# Patient Record
Sex: FEMALE | Race: WHITE | Employment: OTHER | URBAN - METROPOLITAN AREA
[De-identification: names, ages, dates, MRNs, and addresses within clinical notes are randomized per-mention and may not be internally consistent; named-entity substitution may affect disease eponyms.]

---

## 2017-12-12 ENCOUNTER — APPOINTMENT (OUTPATIENT)
Dept: GENERAL RADIOLOGY | Age: 82
DRG: 244 | End: 2017-12-12
Attending: EMERGENCY MEDICINE
Payer: MEDICARE

## 2017-12-12 ENCOUNTER — HOSPITAL ENCOUNTER (INPATIENT)
Age: 82
LOS: 2 days | Discharge: HOME | DRG: 244 | End: 2017-12-14
Attending: EMERGENCY MEDICINE | Admitting: SPECIALIST
Payer: MEDICARE

## 2017-12-12 DIAGNOSIS — I44.2 COMPLETE HEART BLOCK (HCC): Primary | ICD-10-CM

## 2017-12-12 LAB
ALBUMIN SERPL-MCNC: 3.6 G/DL (ref 3.5–5)
ALBUMIN/GLOB SERPL: 1.1 {RATIO} (ref 1.1–2.2)
ALP SERPL-CCNC: 86 U/L (ref 45–117)
ALT SERPL-CCNC: 24 U/L (ref 12–78)
ANION GAP SERPL CALC-SCNC: 8 MMOL/L (ref 5–15)
APPEARANCE UR: ABNORMAL
AST SERPL-CCNC: 16 U/L (ref 15–37)
BACTERIA URNS QL MICRO: NEGATIVE /HPF
BASOPHILS # BLD: 0 K/UL (ref 0–0.1)
BASOPHILS NFR BLD: 0 % (ref 0–1)
BILIRUB SERPL-MCNC: 0.5 MG/DL (ref 0.2–1)
BILIRUB UR QL: NEGATIVE
BUN SERPL-MCNC: 39 MG/DL (ref 6–20)
BUN/CREAT SERPL: 31 (ref 12–20)
CALCIUM SERPL-MCNC: 9.2 MG/DL (ref 8.5–10.1)
CHLORIDE SERPL-SCNC: 105 MMOL/L (ref 97–108)
CK MB CFR SERPL CALC: 3.2 % (ref 0–2.5)
CK MB SERPL-MCNC: 4.4 NG/ML (ref 5–25)
CK SERPL-CCNC: 137 U/L (ref 26–192)
CO2 SERPL-SCNC: 25 MMOL/L (ref 21–32)
COLOR UR: ABNORMAL
CREAT SERPL-MCNC: 1.27 MG/DL (ref 0.55–1.02)
DIFFERENTIAL METHOD BLD: ABNORMAL
EOSINOPHIL # BLD: 0.5 K/UL (ref 0–0.4)
EOSINOPHIL NFR BLD: 4 % (ref 0–7)
EPITH CASTS URNS QL MICRO: ABNORMAL /LPF
ERYTHROCYTE [DISTWIDTH] IN BLOOD BY AUTOMATED COUNT: 13.6 % (ref 11.5–14.5)
GLOBULIN SER CALC-MCNC: 3.3 G/DL (ref 2–4)
GLUCOSE SERPL-MCNC: 114 MG/DL (ref 65–100)
GLUCOSE UR STRIP.AUTO-MCNC: NEGATIVE MG/DL
HCT VFR BLD AUTO: 38.8 % (ref 35–47)
HGB BLD-MCNC: 12.9 G/DL (ref 11.5–16)
HGB UR QL STRIP: NEGATIVE
HYALINE CASTS URNS QL MICRO: ABNORMAL /LPF (ref 0–5)
KETONES UR QL STRIP.AUTO: NEGATIVE MG/DL
LEUKOCYTE ESTERASE UR QL STRIP.AUTO: ABNORMAL
LYMPHOCYTES # BLD: 0.5 K/UL (ref 0.8–3.5)
LYMPHOCYTES NFR BLD: 4 % (ref 12–49)
MAGNESIUM SERPL-MCNC: 2.4 MG/DL (ref 1.6–2.4)
MCH RBC QN AUTO: 30.5 PG (ref 26–34)
MCHC RBC AUTO-ENTMCNC: 33.2 G/DL (ref 30–36.5)
MCV RBC AUTO: 91.7 FL (ref 80–99)
MONOCYTES # BLD: 0.5 K/UL (ref 0–1)
MONOCYTES NFR BLD: 4 % (ref 5–13)
NEUTS SEG # BLD: 11.6 K/UL (ref 1.8–8)
NEUTS SEG NFR BLD: 88 % (ref 32–75)
NITRITE UR QL STRIP.AUTO: NEGATIVE
PH UR STRIP: 5.5 [PH] (ref 5–8)
PLATELET # BLD AUTO: 300 K/UL (ref 150–400)
POTASSIUM SERPL-SCNC: 4.4 MMOL/L (ref 3.5–5.1)
PROT SERPL-MCNC: 6.9 G/DL (ref 6.4–8.2)
PROT UR STRIP-MCNC: NEGATIVE MG/DL
RBC # BLD AUTO: 4.23 M/UL (ref 3.8–5.2)
RBC #/AREA URNS HPF: ABNORMAL /HPF (ref 0–5)
RBC MORPH BLD: ABNORMAL
SODIUM SERPL-SCNC: 138 MMOL/L (ref 136–145)
SP GR UR REFRACTOMETRY: 1.02 (ref 1–1.03)
TROPONIN I SERPL-MCNC: <0.04 NG/ML
TSH SERPL DL<=0.05 MIU/L-ACNC: 2.48 UIU/ML (ref 0.36–3.74)
UA: UC IF INDICATED,UAUC: ABNORMAL
UROBILINOGEN UR QL STRIP.AUTO: 0.2 EU/DL (ref 0.2–1)
WBC # BLD AUTO: 13.1 K/UL (ref 3.6–11)
WBC URNS QL MICRO: ABNORMAL /HPF (ref 0–4)

## 2017-12-12 PROCEDURE — 65660000000 HC RM CCU STEPDOWN

## 2017-12-12 PROCEDURE — 87086 URINE CULTURE/COLONY COUNT: CPT | Performed by: SPECIALIST

## 2017-12-12 PROCEDURE — 74011250636 HC RX REV CODE- 250/636: Performed by: NURSE PRACTITIONER

## 2017-12-12 PROCEDURE — 82550 ASSAY OF CK (CPK): CPT | Performed by: EMERGENCY MEDICINE

## 2017-12-12 PROCEDURE — 84443 ASSAY THYROID STIM HORMONE: CPT | Performed by: NURSE PRACTITIONER

## 2017-12-12 PROCEDURE — 99285 EMERGENCY DEPT VISIT HI MDM: CPT

## 2017-12-12 PROCEDURE — 80053 COMPREHEN METABOLIC PANEL: CPT | Performed by: EMERGENCY MEDICINE

## 2017-12-12 PROCEDURE — 84484 ASSAY OF TROPONIN QUANT: CPT | Performed by: EMERGENCY MEDICINE

## 2017-12-12 PROCEDURE — 81001 URINALYSIS AUTO W/SCOPE: CPT | Performed by: NURSE PRACTITIONER

## 2017-12-12 PROCEDURE — 83735 ASSAY OF MAGNESIUM: CPT | Performed by: NURSE PRACTITIONER

## 2017-12-12 PROCEDURE — 74011250637 HC RX REV CODE- 250/637: Performed by: NURSE PRACTITIONER

## 2017-12-12 PROCEDURE — 71010 XR CHEST PORT: CPT

## 2017-12-12 PROCEDURE — 74011000250 HC RX REV CODE- 250: Performed by: NURSE PRACTITIONER

## 2017-12-12 PROCEDURE — 85025 COMPLETE CBC W/AUTO DIFF WBC: CPT | Performed by: EMERGENCY MEDICINE

## 2017-12-12 PROCEDURE — 36415 COLL VENOUS BLD VENIPUNCTURE: CPT | Performed by: EMERGENCY MEDICINE

## 2017-12-12 PROCEDURE — 93005 ELECTROCARDIOGRAM TRACING: CPT

## 2017-12-12 RX ORDER — IPRATROPIUM BROMIDE AND ALBUTEROL SULFATE 2.5; .5 MG/3ML; MG/3ML
3 SOLUTION RESPIRATORY (INHALATION)
Status: DISCONTINUED | OUTPATIENT
Start: 2017-12-13 | End: 2017-12-14 | Stop reason: HOSPADM

## 2017-12-12 RX ORDER — METOPROLOL SUCCINATE 25 MG/1
25 TABLET, EXTENDED RELEASE ORAL DAILY
COMMUNITY
End: 2017-12-14

## 2017-12-12 RX ORDER — PANTOPRAZOLE SODIUM 40 MG/1
40 TABLET, DELAYED RELEASE ORAL
COMMUNITY
End: 2018-04-19

## 2017-12-12 RX ORDER — IPRATROPIUM BROMIDE AND ALBUTEROL SULFATE 2.5; .5 MG/3ML; MG/3ML
3 SOLUTION RESPIRATORY (INHALATION) 2 TIMES DAILY
Status: DISCONTINUED | OUTPATIENT
Start: 2017-12-12 | End: 2017-12-12

## 2017-12-12 RX ORDER — GUAIFENESIN 100 MG/5ML
200 SOLUTION ORAL
COMMUNITY

## 2017-12-12 RX ORDER — MECLIZINE HCL 12.5 MG 12.5 MG/1
12.5 TABLET ORAL
COMMUNITY

## 2017-12-12 RX ORDER — ACETAMINOPHEN 325 MG/1
650 TABLET ORAL
Status: DISCONTINUED | OUTPATIENT
Start: 2017-12-12 | End: 2017-12-14 | Stop reason: HOSPADM

## 2017-12-12 RX ORDER — GUAIFENESIN 100 MG/5ML
200 SOLUTION ORAL
Status: DISCONTINUED | OUTPATIENT
Start: 2017-12-12 | End: 2017-12-14 | Stop reason: HOSPADM

## 2017-12-12 RX ORDER — LOPERAMIDE HYDROCHLORIDE 2 MG/1
2 CAPSULE ORAL AS NEEDED
COMMUNITY

## 2017-12-12 RX ORDER — IPRATROPIUM BROMIDE AND ALBUTEROL SULFATE 2.5; .5 MG/3ML; MG/3ML
3 SOLUTION RESPIRATORY (INHALATION) 2 TIMES DAILY
COMMUNITY
End: 2018-04-19 | Stop reason: SDUPTHER

## 2017-12-12 RX ORDER — SODIUM CHLORIDE 9 MG/ML
50 INJECTION, SOLUTION INTRAVENOUS CONTINUOUS
Status: DISCONTINUED | OUTPATIENT
Start: 2017-12-12 | End: 2017-12-14 | Stop reason: HOSPADM

## 2017-12-12 RX ORDER — ALPRAZOLAM 0.25 MG/1
0.25 TABLET ORAL AS NEEDED
COMMUNITY
End: 2018-04-19

## 2017-12-12 RX ORDER — ETHAMBUTOL HYDROCHLORIDE 400 MG/1
800 TABLET, FILM COATED ORAL DAILY
COMMUNITY

## 2017-12-12 RX ORDER — CLARITHROMYCIN 500 MG/1
500 TABLET, FILM COATED ORAL 2 TIMES DAILY
Status: DISCONTINUED | OUTPATIENT
Start: 2017-12-12 | End: 2017-12-14 | Stop reason: HOSPADM

## 2017-12-12 RX ORDER — SODIUM CHLORIDE 0.9 % (FLUSH) 0.9 %
5-10 SYRINGE (ML) INJECTION AS NEEDED
Status: DISCONTINUED | OUTPATIENT
Start: 2017-12-12 | End: 2017-12-13

## 2017-12-12 RX ORDER — ETHAMBUTOL HYDROCHLORIDE 400 MG/1
800 TABLET, FILM COATED ORAL DAILY
Status: DISCONTINUED | OUTPATIENT
Start: 2017-12-13 | End: 2017-12-14 | Stop reason: HOSPADM

## 2017-12-12 RX ORDER — MULTIVIT WITH MINERALS/HERBS
1 TABLET ORAL DAILY
COMMUNITY
End: 2018-04-19

## 2017-12-12 RX ORDER — LOSARTAN POTASSIUM 50 MG/1
100 TABLET ORAL DAILY
Status: DISCONTINUED | OUTPATIENT
Start: 2017-12-12 | End: 2017-12-14 | Stop reason: HOSPADM

## 2017-12-12 RX ORDER — IPRATROPIUM BROMIDE AND ALBUTEROL SULFATE 2.5; .5 MG/3ML; MG/3ML
3 SOLUTION RESPIRATORY (INHALATION)
Status: DISCONTINUED | OUTPATIENT
Start: 2017-12-12 | End: 2017-12-14 | Stop reason: HOSPADM

## 2017-12-12 RX ORDER — POLYVINYL ALCOHOL 14 MG/ML
1 SOLUTION/ DROPS OPHTHALMIC 3 TIMES DAILY
Status: DISCONTINUED | OUTPATIENT
Start: 2017-12-12 | End: 2017-12-14 | Stop reason: HOSPADM

## 2017-12-12 RX ORDER — CLARITHROMYCIN 500 MG/1
500 TABLET, FILM COATED ORAL 2 TIMES DAILY
COMMUNITY
Start: 2017-11-23 | End: 2018-04-19

## 2017-12-12 RX ORDER — SAME BUTANEDISULFONATE/BETAINE 400-600 MG
250 POWDER IN PACKET (EA) ORAL DAILY
COMMUNITY

## 2017-12-12 RX ORDER — CEFAZOLIN SODIUM/WATER 2 G/20 ML
2 SYRINGE (ML) INTRAVENOUS ONCE
Status: COMPLETED | OUTPATIENT
Start: 2017-12-13 | End: 2017-12-13

## 2017-12-12 RX ORDER — HYDROCHLOROTHIAZIDE 25 MG/1
12.5 TABLET ORAL DAILY
Status: DISCONTINUED | OUTPATIENT
Start: 2017-12-12 | End: 2017-12-14 | Stop reason: HOSPADM

## 2017-12-12 RX ORDER — LOSARTAN POTASSIUM AND HYDROCHLOROTHIAZIDE 12.5; 1 MG/1; MG/1
1 TABLET ORAL DAILY
COMMUNITY
End: 2017-12-22 | Stop reason: DRUGHIGH

## 2017-12-12 RX ORDER — FLUTICASONE FUROATE AND VILANTEROL 100; 25 UG/1; UG/1
1 POWDER RESPIRATORY (INHALATION) DAILY
COMMUNITY

## 2017-12-12 RX ORDER — SODIUM CHLORIDE 0.9 % (FLUSH) 0.9 %
5-10 SYRINGE (ML) INJECTION EVERY 8 HOURS
Status: DISCONTINUED | OUTPATIENT
Start: 2017-12-12 | End: 2017-12-13 | Stop reason: HOSPADM

## 2017-12-12 RX ORDER — BISMUTH SUBSALICYLATE 262 MG
1 TABLET,CHEWABLE ORAL DAILY
COMMUNITY

## 2017-12-12 RX ORDER — IPRATROPIUM BROMIDE AND ALBUTEROL SULFATE 2.5; .5 MG/3ML; MG/3ML
3 SOLUTION RESPIRATORY (INHALATION)
COMMUNITY

## 2017-12-12 RX ORDER — PANTOPRAZOLE SODIUM 40 MG/1
40 TABLET, DELAYED RELEASE ORAL
Status: DISCONTINUED | OUTPATIENT
Start: 2017-12-13 | End: 2017-12-14 | Stop reason: HOSPADM

## 2017-12-12 RX ORDER — CHOLECALCIFEROL (VITAMIN D3) 125 MCG
1 CAPSULE ORAL DAILY
COMMUNITY

## 2017-12-12 RX ADMIN — SODIUM CHLORIDE 50 ML/HR: 900 INJECTION, SOLUTION INTRAVENOUS at 16:39

## 2017-12-12 RX ADMIN — SODIUM CHLORIDE 50 ML/HR: 900 INJECTION, SOLUTION INTRAVENOUS at 16:41

## 2017-12-12 RX ADMIN — LOSARTAN POTASSIUM 100 MG: 50 TABLET ORAL at 16:36

## 2017-12-12 RX ADMIN — POLYVINYL ALCOHOL 1 DROP: 14 SOLUTION/ DROPS OPHTHALMIC at 19:07

## 2017-12-12 RX ADMIN — Medication 10 ML: at 16:42

## 2017-12-12 RX ADMIN — CLARITHROMYCIN 500 MG: 500 TABLET ORAL at 17:08

## 2017-12-12 RX ADMIN — Medication 10 ML: at 21:32

## 2017-12-12 RX ADMIN — POLYVINYL ALCOHOL 1 DROP: 14 SOLUTION/ DROPS OPHTHALMIC at 21:31

## 2017-12-12 RX ADMIN — HYDROCHLOROTHIAZIDE 12.5 MG: 25 TABLET ORAL at 16:36

## 2017-12-12 NOTE — ADVANCED PRACTICE NURSE
Per pt's daughter, pt is a DNR at [facility]. Daughter agreeable to rescind for procedure and for tonight to proceed with PPM in a.m.

## 2017-12-12 NOTE — PROGRESS NOTES
Admission Medication Reconciliation:    Information obtained from: Chart Review/MAR from [facility] and spoke with nurse    Comments/Recommendations: Updated PTA meds/reviewed patient's allergies. 1)  No changes made to medication list    2)  Per patient's nurse, she took all of her medications this morning but vomited shortly after taking them. Significant PMH/Disease States:   Past Medical History:   Diagnosis Date    Bron[facility]ctasis ([facility] [address]     Gastrointestinal disorder     benign tumor in colon 1982    Hx of colonic polyps     Hypertension     Kidney stones     Mastoiditis     Mycobacterium avium infection ([facility] [address]     Other ill-defined conditions(799.89)     microbacterium isidra- intracellular lung disease    PNA (pneumonia)     Vertigo        Chief Complaint for this Admission:    Chief Complaint   Patient presents with    Slow Heart Rate       Allergies:  Review of patient's allergies indicates no known allergies. Prior to Admission Medications:   Prior to Admission Medications   Prescriptions Last Dose Informant Patient Reported? Taking? ALPRAZolam (XANAX) 0.25 mg tablet   Yes Yes   Sig: Take 0.25 mg by mouth as needed for Anxiety (30-60 min prior to dental procedures). Saccharomyces boulardii (FLORASTOR) 250 mg capsule 12/12/2017 at AM  Yes Yes   Sig: Take 250 mg by mouth two (2) times a day. acetaminophen (TYLENOL) 325 mg tablet   No Yes   Sig: Take 2 Tabs by mouth every four (4) hours as needed. albuterol-ipratropium (DUO-NEB) 2.5 mg-0.5 mg/3 ml nebu   Yes Yes   Sig: 3 mL by Nebulization route every six (6) hours as needed. albuterol-ipratropium (DUO-NEB) 2.5 mg-0.5 mg/3 ml nebu 12/12/2017 at AM  Yes Yes   Sig: 3 mL by Nebulization route two (2) times a day. b complex vitamins tablet 12/12/2017 at AM  Yes Yes   Sig: Take 1 Tab by mouth daily. cholecalciferol, vitamin D3, (VITAMIN D3) 2,000 unit tab 12/12/2017 at AM  Yes Yes   Sig: Take 1 Tab by mouth daily. clarithromycin (BIAXIN) 500 mg tablet 2017 at AM  Yes Yes   Sig: Take 500 mg by mouth two (2) times a day.   ethambutol (MYAMBUTOL) 400 mg tablet 2017 at AM  Yes Yes   Sig: Take 800 mg by mouth daily. Indications: pulmonary myobacterial infection   fluticasone-vilanterol (BREO ELLIPTA) 100-25 mcg/dose inhaler 2017 at AM  Yes Yes   Sig: Take 1 Puff by inhalation daily. guaiFENesin (ROBITUSSIN MUCUS-CHEST CONGEST) 100 mg/5 mL liquid   Yes Yes   Sig: Take 200 mg by mouth every four (4) hours as needed for Cough. loperamide (IMODIUM) 2 mg capsule   Yes Yes   Sig: Take 2 mg by mouth as needed for Diarrhea (after every loose stool, maximum of 6 caps/24 hours). losartan-hydroCHLOROthiazide (HYZAAR) 100-12.5 mg per tablet 2017 at AM  Yes Yes   Sig: Take 1 Tab by mouth daily. Indications: hypertension   meclizine (ANTIVERT) 12.5 mg tablet   Yes Yes   Sig: Take 12.5 mg by mouth nightly as needed. Indications: Vertigo   metoprolol succinate (TOPROL-XL) 25 mg XL tablet 2017 at AM  Yes Yes   Sig: Take 25 mg by mouth daily. Indications: hypertension   multivitamin (ONE A DAY) tablet 2017 at AM  Yes Yes   Sig: Take 1 Tab by mouth daily. pantoprazole (PROTONIX) 40 mg tablet 2017 at AM  Yes Yes   Sig: Take 40 mg by mouth every morning. peg 400-propylene glycol (SYSTANE, PROPYLENE GLYCOL,) 0.4-0.3 % drop 2017 at AM  Yes Yes   Si Drop three (3) times daily.       Facility-Administered Medications: None

## 2017-12-12 NOTE — IP AVS SNAPSHOT
[address] 
[phone] Patient: Hope Bailey MRN: [id number] :[id number] My Medications STOP taking these medications   
 metoprolol succinate 25 mg XL tablet Commonly known as:  TOPROL-XL  
   
  
  
TAKE these medications as instructed Instructions Each Dose to Equal  
 Morning Noon Evening Bedtime  
 acetaminophen 325 mg tablet Commonly known as:  TYLENOL Your last dose was: Your next dose is: Take 2 Tabs by mouth every four (4) hours as needed. 650 mg  
    
   
   
   
  
 * albuterol-ipratropium 2.5 mg-0.5 mg/3 ml Nebu Commonly known as:  Daryle [place] Your last dose was: Your next dose is:    
   
   
 3 mL by Nebulization route every six (6) hours as needed. 3 mL * albuterol-ipratropium 2.5 mg-0.5 mg/3 ml Nebu Commonly known as:  Daryle [place] Your last dose was: Your next dose is:    
   
   
 3 mL by Nebulization route two (2) times a day. 3 mL  
    
   
   
   
  
 b complex vitamins tablet Your last dose was: Your next dose is: Take 1 Tab by mouth daily. 1 Tab BREO ELLIPTA 100-25 mcg/dose inhaler Generic drug:  fluticasone-vilanterol Your last dose was: Your next dose is: Take 1 Puff by inhalation daily. 1 Puff  
    
   
   
   
  
 carvedilol 6.25 mg tablet Commonly known as:  Luigi Harris Your last dose was: Your next dose is: Take 1 Tab by mouth two (2) times daily (with meals). 6.25 mg  
    
   
   
   
  
 cephALEXin 250 mg capsule Commonly known as:  Sha Borges Your last dose was: Your next dose is: Take 1 Cap by mouth three (3) times daily for 14 doses. 250 mg  
    
   
   
   
  
 clarithromycin 500 mg tablet Commonly known as:  Lu Shook Your last dose was: Your next dose is: Take 500 mg by mouth two (2) times a day. 500 mg  
    
   
   
   
  
 ethambutol 400 mg tablet Commonly known as:  MYAMBUTOL Your last dose was: Your next dose is: Take 800 mg by mouth daily. Indications: pulmonary myobacterial infection 800 mg FLORASTOR 250 mg capsule Generic drug:  Saccharomyces boulardii Your last dose was: Your next dose is: Take 250 mg by mouth two (2) times a day. 250 mg  
    
   
   
   
  
 loperamide 2 mg capsule Commonly known as:  IMODIUM Your last dose was: Your next dose is: Take 2 mg by mouth as needed for Diarrhea (after every loose stool, maximum of 6 caps/24 hours). 2 mg  
    
   
   
   
  
 losartan-hydroCHLOROthiazide 100-12.5 mg per tablet Commonly known as:  HYZAAR Your last dose was: Your next dose is: Take 1 Tab by mouth daily. Indications: hypertension 1 Tab  
    
   
   
   
  
 meclizine 12.5 mg tablet Commonly known as:  ANTIVERT Your last dose was: Your next dose is: Take 12.5 mg by mouth nightly as needed. Indications: Vertigo 12.5 mg  
    
   
   
   
  
 multivitamin tablet Commonly known as:  ONE A DAY Your last dose was: Your next dose is: Take 1 Tab by mouth daily. 1 Tab PROTONIX 40 mg tablet Generic drug:  pantoprazole Your last dose was: Your next dose is: Take 40 mg by mouth every morning. 40 mg  
    
   
   
   
  
 ROBITUSSIN MUCUS-CHEST CONGEST 100 mg/5 mL liquid Generic drug:  guaiFENesin Your last dose was: Your next dose is: Take 200 mg by mouth every four (4) hours as needed for Cough. 200 mg SYSTANE (PROPYLENE GLYCOL) 0.4-0.3 % Drop Generic drug:  peg 400-propylene glycol Your last dose was: Your next dose is:    
   
   
 1 Drop three (3) times daily. 1 Drop VITAMIN D3 2,000 unit Tab Generic drug:  cholecalciferol (vitamin D3) Your last dose was: Your next dose is: Take 1 Tab by mouth daily. 1 Tab XANAX 0.25 mg tablet Generic drug:  ALPRAZolam  
   
Your last dose was: Your next dose is: Take 0.25 mg by mouth as needed for Anxiety (30-60 min prior to dental procedures). 0.25 mg  
    
   
   
   
  
 * Notice: This list has 2 medication(s) that are the same as other medications prescribed for you. Read the directions carefully, and ask your doctor or other care provider to review them with you. Where to Get Your Medications Information on where to get these meds will be given to you by the nurse or doctor. ! Ask your nurse or doctor about these medications  
  carvedilol 6.25 mg tablet  
 cephALEXin 250 mg capsule

## 2017-12-12 NOTE — H&P
Cardiology Consult Note      Patient Name: William Martino  : 1924 MRN: [id number]  Date: 2017  Time: 3:24 PM    Admit Diagnosis: Complete heart block [facility])    Primary Cardiologist: None   Consulting Cardiologist: Alphonso Ohara M.D.    Reason for Consult: complete heart block    Requesting MD: Dr. Meet Batista MD    HPI:  William Martino is a 80 y.o. female admitted on 2017  for Complete heart block ([address] Has PMH of HTN, kidney stone,GERD,  mastoiditis, vertigo, bronchiectasis, MAC and pneumonia who presents from  [facility] via EMS with chief complaint of bradycardia. As per EMS, the pt was found to be bradycardic this morning without other sx. EMS found a complete heart block on the pt and she was bradycardic in the 40-50s. As per the daughter, the pt had an episode of vomiting this morning. The pt comes from the [facility] area of [facility] and the daughter reports that she is at her baseline mentation. The pt has tremors at baseline. The pt has no current complaints. The daughter reports that the pt has chronic exertional SOB. The pt has not had prior cardiac problems or seen a cardiologist in the past. The pt is not on new medications. The pt denies SOB, CP, and fever. Cardiology consulted for complete heart block. Subjective:  Daughter at bedside reports that pt had an episode of emesis this a.m. At [facility] which prompted physician evaluation. Pt was noted to be bradycardic and EMS was called. EMS found pt to be in complete heart block. Pt had no chest pain, dizziness, light headedness, syncope. Daughter states that pt does not have any hx of cardiac disease including MI, arrhythmia. No hx of syncope. Does have hx of HTN. Reports long standing hx of vertigo for which she occasionally takes meclizine.  Daughter states that pt walks with walker at home and has not had any c/o chest pain. Pt reports left ankle swelling but no tenderness. No fevers, chills. Has chronic bronchiectasis. Assessment and Plan     1. Complete heart block: asymptomatic currently   -Stop home toprol XL  -Admit to telemetry. -TSH normal  -lytes ok- will add on magnesium  -TTE ordered  -Consult to Dr. Chadwick Molina for PPM- discussed with pt and daughter- agreeable to plan. 2. Hx of HTN:  Currently normotensive  -losartan HCTZ 100-12.5 daily (home med) w/hold parameter of 031 or less systolic  -Hold Toprol XL    3. Leukocytosis:  WBC 13.1. No fever  -Will check urinalysis. -CXR with Mild bibasilar patchy air space disease    4. Elevated creatinine:  Creatinine 1.27  (from 0.76 1/2016)  -?perhaps due to decreased renal perfusion from bradycardia  -Will start gentle IV fluids (50 ml/hr)  -Recheck BMP in a.m.    5. Hx of MAC  -Resume home clarithromycin and ethambutol    6. Hx of bronchiectasis: on home nebs. 7. Hx of dementia. Cardiology Attending:Patient seen and examined. I agree with NP assessment and plans. Dr. Chadwick Molina will see regarding possible pacemaker. Lily Remy MD 12/12/2017 5:12 PM       Review of Symptoms:  Pertinent items are noted in HPI. and subjective    Previous treatment/evaluation includes none .   Cardiac risk factors: sedentary life style, hypertension, post-menopausal.    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    Hx of colonic polyps     Hypertension     Kidney stones     Mastoiditis     Mycobacterium avium infection ([address]     Other ill-defined conditions(799.89)     microbacterium isidra- intracellular lung disease    PNA (pneumonia)     Vertigo      Past Surgical History:   Procedure Laterality Date    ABDOMEN SURGERY PROC UNLISTED      colon tumor removed 1982    HX UROLOGICAL      bladder sling 2007     Current Facility-Administered Medications   Medication Dose Route Frequency    albuterol-ipratropium (DUO-NEB) 2.5 MG-0.5 MG/3 ML  3 mL Nebulization Q6H PRN    albuterol-ipratropium (DUO-NEB) 2.5 MG-0.5 MG/3 ML  3 mL Nebulization BID    guaiFENesin (ROBITUSSIN) 100 mg/5 mL oral liquid 200 mg  200 mg Oral Q4H PRN    acetaminophen (TYLENOL) tablet 650 mg  650 mg Oral Q4H PRN    [START ON 12/13/2017] pantoprazole (PROTONIX) tablet 40 mg  40 mg Oral 7am    losartan (COZAAR) tablet 100 mg  100 mg Oral DAILY    And    hydroCHLOROthiazide (HYDRODIURIL) tablet 12.5 mg  12.5 mg Oral DAILY     Current Outpatient Prescriptions   Medication Sig    ALPRAZolam (XANAX) 0.25 mg tablet Take 0.25 mg by mouth as needed for Anxiety (30-60 min prior to dental procedures).  fluticasone-vilanterol (BREO ELLIPTA) 100-25 mcg/dose inhaler Take 1 Puff by inhalation daily.  clarithromycin (BIAXIN) 500 mg tablet Take 500 mg by mouth two (2) times a day.  albuterol-ipratropium (DUO-NEB) 2.5 mg-0.5 mg/3 ml nebu 3 mL by Nebulization route every six (6) hours as needed.  albuterol-ipratropium (DUO-NEB) 2.5 mg-0.5 mg/3 ml nebu 3 mL by Nebulization route two (2) times a day.  ethambutol (MYAMBUTOL) 400 mg tablet Take 800 mg by mouth daily. Indications: pulmonary myobacterial infection    Saccharomyces boulardii (FLORASTOR) 250 mg capsule Take 250 mg by mouth two (2) times a day.  loperamide (IMODIUM) 2 mg capsule Take 2 mg by mouth as needed for Diarrhea.  losartan-hydroCHLOROthiazide (HYZAAR) 100-12.5 mg per tablet Take 1 Tab by mouth daily. Indications: hypertension    meclizine (ANTIVERT) 12.5 mg tablet Take 12.5 mg by mouth nightly as needed. Indications: Vertigo    metoprolol succinate (TOPROL-XL) 25 mg XL tablet Take 25 mg by mouth daily. Indications: hypertension    multivitamin (ONE A DAY) tablet Take 1 Tab by mouth daily.  pantoprazole (PROTONIX) 40 mg tablet Take 40 mg by mouth every morning.     guaiFENesin (ROBITUSSIN MUCUS-CHEST CONGEST) 100 mg/5 mL liquid Take 200 mg by mouth every four (4) hours as needed for Cough.  peg 400-propylene glycol (SYSTANE, PROPYLENE GLYCOL,) 0.4-0.3 % drop 1 Drop three (3) times daily.  b complex vitamins tablet Take 1 Tab by mouth daily.  cholecalciferol, vitamin D3, (VITAMIN D3) 2,000 unit tab Take 1 Tab by mouth daily.  acetaminophen (TYLENOL) 325 mg tablet Take 2 Tabs by mouth every four (4) hours as needed. No Known Allergies   History reviewed. No pertinent family history. Social History     Social History    Marital status:      Spouse name: N/A    Number of children: N/A    Years of education: N/A     Social History Main Topics    Smoking status: Never Smoker    Smokeless tobacco: Never Used    Alcohol use No    Drug use: No    Sexual activity: Not Asked     Other Topics Concern    None     Social History Narrative       Objective:    Physical Exam    Vitals:   Vitals:    12/12/17 1404 12/12/17 1445 12/12/17 1500   BP: 138/61 126/59 147/61   Pulse: (!) 40 (!) 39 (!) 38   Resp: 18 20 17   Temp: 97.7 °F (36.5 °C)     SpO2: 98% 96% 96%       General:    Alert, cooperative, no distress, appears stated age. Neck:   Supple,  no JVD. Back:     Symmetric, . Lungs:     Course bases, no use of accessory muscles. Heart[de-identified]    Regular rate and rhythm, S1, S2 normal, grade II/VI systolic murmur heard best at RUSB     Abdomen:     Soft, non-tender. Bowel sounds normal. No masses,  No      organomegaly. Extremities:   Extremities normal, atraumatic, trace left ankle edema   Vascular:   Pulses - 2+   Skin:   Skin color normal. No rashes or lesions   Neurologic:   CASTILLO       Telemetry: Complete heart block- rate 35-38 currently    ECG: complete heart block with wide complex escape rhythm.     Data Review:     Radiology:   CXR: Mild bibasilar patchy air space disease    Recent Labs      12/12/17   1410   CPK  137   TROIQ  <0.04     Recent Labs      12/12/17   1410   NA  138   K  4.4   CL  105   CO2  25   BUN  39*   CREA 1.27*   GLU  114*   CA  9.2     Recent Labs      12/12/17   1410   WBC  13.1*   HGB  12.9   HCT  38.8   PLT  300     Recent Labs      12/12/17   1410   SGOT  16   AP  86     No results for input(s): CHOL, LDLC in the last 72 hours. No lab exists for component: TGL, HDLC,  HBA1C  No results for input(s): CRP, TSH, TSHEXT in the last 72 hours. No lab exists for component: ESR    Thank you very much for this referral. I appreciate the opportunity to participate in this patient's care. I will follow along with above stated plan. Jennifer Collins. DONALD Bruno         [facility] [address] [place] [address],[address]     [place]     [phone]    CC: Saleem Romo MD

## 2017-12-12 NOTE — DISCHARGE INSTRUCTIONS
PATIENT INSTRUCTIONS POST-PACEMAKER IMPLANT    1. No heavy lifting or exercises with the left arm for 4 weeks. This includes the following:  Do not raise arm above the shoulder level to comb hair, pull on clothes, etc... Do not use the affected arm to pull up or push up from a seated or laying  down position. Do not allow anyone else to pull on the affected arm. 2. Remove aquacel dressing in a week. Your incision will have skin glue covering the incision, the skin glue will help to reinforce the incision to prevent it from opening, please DO NOT attempt to peel the glue off of the incision. You do have sutures on the inside of the incision that are not visible. Please DO NOT try to scrub the skin glue off once you are able to take a shower. The skin glue will eventually fall off     3. Do not drive for 3 days    4. Call Dr. Manjinder Mae at [phone] if you experience any of the following symptoms:  1. Redness at the pacemaker site  2. Swelling at or around the pacemaker or in the left arm  3. Pain around the pacemaker  4. Dizziness, lightheadedness, fainting spells  5. Lack of energy  6. Shortness of breath  7. Rapid heart rate  8. Chest or muscle twitches    5. Follow-up    12/22/17 at 9:45 am   Future Appointments  Date Time Provider Juan Miguel Garcia          12/22/2017 9:45 AM PACEMAKER3, [address]   12/22/2017 10:00 AM Dede Mejias  [address]     6. You may use pain medication and ice pack for pain relief as needed. You may wear the sling as a reminder to keep your arm below the your shoulder. Celina Urena M.D.  [facility]  Electrophysiology/Cardiology  [facility]  [address], [place] [address], [place] [address]  [address]  [phone]      Pt has Margareth Contreras provider to monitor creatinine.

## 2017-12-12 NOTE — IP AVS SNAPSHOT
[address] [facility] [address] 
[phone] Patient: Hilary Mckeon MRN: [id number] :[id number] About your hospitalization You were admitted on:  2017 You last received care in the:  [facility] 4 CV SERVICES UNIT You were discharged on:  2017 Why you were hospitalized Your primary diagnosis was:  Not on File Your diagnoses also included:  Complete Heart Block (Hcc), S/P Cardiac Pacemaker Procedure Things You Need To Do (next 8 weeks) Follow up with Adriana Lamar MD  
  
Phone:  [phone] Where:  [address], [place] [address] [address] Follow up with [facility] Phone:  [phone] Where:  [facility] [address], [place] [address] [address] Friday Dec 22, 2017 PACEMAKER with Olivares Master at  9:45 AM  
Where:  [address] ([facility]) ESTABLISHED PATIENT with Mercy Covington NP at 10:00 AM  
Where:  [address] ([facility]) Follow up with Tete Gordon MD  
Wound check with Brooklyn Crystal 73 AM  
  
Phone:  [phone] Where:  [address], [place] [address] [address]  HOSPITAL DISCHARGE with Jerald Mendez MD at 10:20 AM  
Where:  [address] ([facility]) Discharge Orders None A check dipesh indicates which time of day the medication should be taken. My Medications STOP taking these medications   
 metoprolol succinate 25 mg XL tablet Commonly known as:  TOPROL-XL  
   
  
  
TAKE these medications as instructed Instructions Each Dose to Equal  
 Morning Noon Evening Bedtime  
 acetaminophen 325 mg tablet Commonly known as:  TYLENOL Your last dose was: Your next dose is: Take 2 Tabs by mouth every four (4) hours as needed. 650 mg  
    
   
   
   
  
 * albuterol-ipratropium 2.5 mg-0.5 mg/3 ml Nebu Commonly known as:  Denise Sigala Your last dose was: Your next dose is:    
   
   
 3 mL by Nebulization route every six (6) hours as needed. 3 mL * albuterol-ipratropium 2.5 mg-0.5 mg/3 ml Nebu Commonly known as:  Denise Sigala Your last dose was: Your next dose is:    
   
   
 3 mL by Nebulization route two (2) times a day. 3 mL  
    
   
   
   
  
 b complex vitamins tablet Your last dose was: Your next dose is: Take 1 Tab by mouth daily. 1 Tab BREO ELLIPTA 100-25 mcg/dose inhaler Generic drug:  fluticasone-vilanterol Your last dose was: Your next dose is: Take 1 Puff by inhalation daily. 1 Puff  
    
   
   
   
  
 carvedilol 6.25 mg tablet Commonly known as:  Phineas Hoar Your last dose was: Your next dose is: Take 1 Tab by mouth two (2) times daily (with meals). 6.25 mg  
    
   
   
   
  
 cephALEXin 250 mg capsule Commonly known as:  Nickola Newer Your last dose was: Your next dose is: Take 1 Cap by mouth three (3) times daily for 14 doses. 250 mg  
    
   
   
   
  
 clarithromycin 500 mg tablet Commonly known as:  Lisandratriston Alex Your last dose was: Your next dose is: Take 500 mg by mouth two (2) times a day. 500 mg  
    
   
   
   
  
 ethambutol 400 mg tablet Commonly known as:  MYAMBUTOL Your last dose was: Your next dose is: Take 800 mg by mouth daily. Indications: pulmonary myobacterial infection 800 mg FLORASTOR 250 mg capsule Generic drug:  Saccharomyces boulardii Your last dose was: Your next dose is: Take 250 mg by mouth two (2) times a day.   
 250 mg  
    
   
   
 loperamide 2 mg capsule Commonly known as:  IMODIUM Your last dose was: Your next dose is: Take 2 mg by mouth as needed for Diarrhea (after every loose stool, maximum of 6 caps/24 hours). 2 mg  
    
   
   
   
  
 losartan-hydroCHLOROthiazide 100-12.5 mg per tablet Commonly known as:  HYZAAR Your last dose was: Your next dose is: Take 1 Tab by mouth daily. Indications: hypertension 1 Tab  
    
   
   
   
  
 meclizine 12.5 mg tablet Commonly known as:  ANTIVERT Your last dose was: Your next dose is: Take 12.5 mg by mouth nightly as needed. Indications: Vertigo 12.5 mg  
    
   
   
   
  
 multivitamin tablet Commonly known as:  ONE A DAY Your last dose was: Your next dose is: Take 1 Tab by mouth daily. 1 Tab PROTONIX 40 mg tablet Generic drug:  pantoprazole Your last dose was: Your next dose is: Take 40 mg by mouth every morning. 40 mg  
    
   
   
   
  
 ROBITUSSIN MUCUS-CHEST CONGEST 100 mg/5 mL liquid Generic drug:  guaiFENesin Your last dose was: Your next dose is: Take 200 mg by mouth every four (4) hours as needed for Cough. 200 mg SYSTANE (PROPYLENE GLYCOL) 0.4-0.3 % Drop Generic drug:  peg 400-propylene glycol Your last dose was: Your next dose is:    
   
   
 1 Drop three (3) times daily. 1 Drop VITAMIN D3 2,000 unit Tab Generic drug:  cholecalciferol (vitamin D3) Your last dose was: Your next dose is: Take 1 Tab by mouth daily. 1 Tab XANAX 0.25 mg tablet Generic drug:  ALPRAZolam  
   
Your last dose was: Your next dose is: Take 0.25 mg by mouth as needed for Anxiety (30-60 min prior to dental procedures).   
 0.25 mg  
    
   
   
   
 * Notice: This list has 2 medication(s) that are the same as other medications prescribed for you. Read the directions carefully, and ask your doctor or other care provider to review them with you. Where to Get Your Medications Information on where to get these meds will be given to you by the nurse or doctor. ! Ask your nurse or doctor about these medications  
  carvedilol 6.25 mg tablet  
 cephALEXin 250 mg capsule Discharge Instructions PATIENT INSTRUCTIONS POST-PACEMAKER IMPLANT 1. No heavy lifting or exercises with the left arm for 4 weeks. This includes the following: Do not raise arm above the shoulder level to comb hair, pull on clothes, etc... Do not use the affected arm to pull up or push up from a seated or laying 
down position. Do not allow anyone else to pull on the affected arm. 2. Remove aquacel dressing in a week. Your incision will have skin glue covering the incision, the skin glue will help to reinforce the incision to prevent it from opening, please DO NOT attempt to peel the glue off of the incision. You do have sutures on the inside of the incision that are not visible. Please DO NOT try to scrub the skin glue off once you are able to take a shower. The skin glue will eventually fall off 3. Do not drive for 3 days 4. Call Dr. Shyam Banuelos at [phone] if you experience any of the following symptoms: 1. Redness at the pacemaker site 2. Swelling at or around the pacemaker or in the left arm 3. Pain around the pacemaker 4. Dizziness, lightheadedness, fainting spells 5. Lack of energy 6. Shortness of breath 7. Rapid heart rate 8. Chest or muscle twitches 5. Follow-up 12/22/17 at 9:45 am  
Future Appointments Date Time Provider Juan Miguel Garcia 12/22/2017 9:45 AM PACEMAKER3, Ruby MOSS  
12/22/2017 10:00 AM Wendy Ackerman  [address]  
 
 6. You may use pain medication and ice pack for pain relief as needed. You may wear the sling as a reminder to keep your arm below the your shoulder. Meghna Patel M.D. [facility] Electrophysiology/Cardiology [facility] [address] [place] [address] 
[phone]                                        [phone] Pt has DDNR [facility] provider to monitor creatinine. [facility] Announcement We are excited to announce that we are making your provider's discharge notes available to you in [facility]. You will see these notes when they are completed and signed by the physician that discharged you from your recent hospital stay. If you have any questions or concerns about any information you see in [facility], please call the Health Information Department where you were seen or reach out to your Primary Care Provider for more information about your plan of care. Introducing [facility]! Cindy Yarbrough introduces [facility] patient portal. Now you can access parts of your medical record, email your doctor's office, and request medication refills online. 1. In your internet browser, go to [email or web address] 2. Click on the First Time User? Click Here link in the Sign In box. You will see the New Member Sign Up page. 3. Enter your [facility] Access Code exactly as it appears below. You will not need to use this code after youve completed the sign-up process. If you do not sign up before the expiration date, you must request a new code. · [facility] Access Code: [email or web address] Expires: 3/14/2018 11:47 AM 
 
4. Enter the last four digits of your Social Security Number (xxxx) and Date of Birth (mm/dd/yyyy) as indicated and click Submit. You will be taken to the next sign-up page. 5. Create a [employer] ID. This will be your [employer] login ID and cannot be changed, so think of one that is secure and easy to remember. 6. Create a [employer] password. You can change your password at any time. 7. Enter your Password Reset Question and Answer. This can be used at a later time if you forget your password. 8. Enter your e-mail address. You will receive e-mail notification when new information is available in [address] 9. Click Sign Up. You can now view and download portions of your medical record. 10. Click the Download Summary menu link to download a portable copy of your medical information. If you have questions, please visit the Frequently Asked Questions section of the [employer] website. Remember, [employer] is NOT to be used for urgent needs. For medical emergencies, dial 911. Now available from your iPhone and Android! Providers Seen During Your Hospitalization Provider Specialty Primary office phone Karrie Wood MD Emergency Medicine [phone] Adrianna Caicedo MD Cardiology [phone] Your Primary Care Physician (PCP) Primary Care Physician Office Phone Office Fax Madina Billyelizabeth [phone] You are allergic to the following No active allergies Recent Documentation Weight Breastfeeding? BMI OB Status Smoking Status 55.2 kg No 20.25 kg/m2 Postmenopausal Never Smoker Emergency Contacts Name Discharge Info Relation Home Work Mobile [address] [facility] CAREGIVER [3] Child [2] [phone] [facility] DISCHARGE CAREGIVER [3] Daughter [21]   [phone] Gricel Lugo  Daughter [21] [phone] Patient Belongings The following personal items are in your possession at time of discharge: 
  Dental Appliances: None  Visual Aid: Glasses, With patient      Home Medications: None   Jewelry: Ring, With patient  Clothing:  Footwear, Pants, Shirt, Sweater, Undergarments, Socks, With patient    Other Valuables: None  Personal Items Sent to Safe: n/a Please provide this summary of care documentation to your next provider. Signatures-by signing, you are acknowledging that this After Visit Summary has been reviewed with you and you have received a copy. Patient Signature:  ____________________________________________________________ Date:  ____________________________________________________________  
  
Jacqlyn Newcomer Provider Signature:  ____________________________________________________________ Date:  ____________________________________________________________

## 2017-12-12 NOTE — ROUTINE PROCESS
TRANSFER - OUT REPORT:    Verbal report given to [address], RN(name) on Matty GONSALEZ Refo  being transferred to CVSU(unit) for routine progression of care       Report consisted of patients Situation, Background, Assessment and   Recommendations(SBAR). Information from the following report(s) SBAR and Recent Results was reviewed with the receiving nurse. Lines:   Peripheral IV 12/12/17 Left Antecubital (Active)   Site Assessment Clean, dry, & intact 12/12/2017  2:04 PM   Phlebitis Assessment 0 12/12/2017  2:04 PM   Infiltration Assessment 0 12/12/2017  2:04 PM   Dressing Status Clean, dry, & intact 12/12/2017  2:04 PM   Dressing Type Tape 12/12/2017  2:04 PM   Hub Color/Line Status Pink;Flushed;Patent 12/12/2017  2:04 PM   Action Taken Blood drawn 12/12/2017  2:04 PM        Opportunity for questions and clarification was provided. Patient transported with:   Monitor  Registered Nurse     Update: Patient aware of plan of care, IV patent, assessment unchanged, VSS, all upon transfer to inpatient unit.      Visit Vitals    /61    Pulse (!) 38    Temp 97.7 °F (36.5 °C)    Resp 17    SpO2 96%

## 2017-12-12 NOTE — IP AVS SNAPSHOT
[facility] [address] 
[phone] Patient: Laurie Negron MRN: [id number] About your hospitalization You were admitted on:  2017 You last received care in the:  [facility] 4 CV SERVICES UNIT You were discharged on:  2017 Why you were hospitalized Your primary diagnosis was:  Not on File Your diagnoses also included:  Complete Heart Block (Hcc), S/P Cardiac Pacemaker Procedure Things You Need To Do (next 8 weeks) Follow up with Bryson Ortiz MD  
  
Phone:  [phone] Where:  [address], "[employer] 7 [address] Follow up with [facility] Phone:  [phone] Where:  [address], "[employer] 7 [address] Friday Dec 22, 2017 PACEMAKER with Junaid Horvath at  9:45 AM  
Where:  [address] ([address]) ESTABLISHED PATIENT with Star Parker NP at 10:00 AM  
Where:  [address] ([address]) Follow up with Marjan Alexander MD  
Wound check with John Crystal 73 AM  
  
Phone:  [phone] Where:  [address] [facility] [address], "[employer] 7 [address]  HOSPITAL DISCHARGE with Lily Remy MD at 10:20 AM  
Where:  [address] ([address]) Discharge Orders None A check dipesh indicates which time of day the medication should be taken. My Medications STOP taking these medications   
 metoprolol succinate 25 mg XL tablet Commonly known as:  TOPROL-XL  
   
  
  
TAKE these medications as instructed Instructions Each Dose to Equal  
 Morning Noon Evening Bedtime  
 acetaminophen 325 mg tablet Commonly known as:  TYLENOL Your last dose was: Your next dose is: Take 2 Tabs by mouth every four (4) hours as needed. 650 mg  
    
   
   
   
  
 * albuterol-ipratropium 2.5 mg-0.5 mg/3 ml Nebu Commonly known as:  Ynes Hardy Your last dose was: Your next dose is:    
   
   
 3 mL by Nebulization route every six (6) hours as needed. 3 mL * albuterol-ipratropium 2.5 mg-0.5 mg/3 ml Nebu Commonly known as:  Ynes Hardy Your last dose was: Your next dose is:    
   
   
 3 mL by Nebulization route two (2) times a day. 3 mL  
    
   
   
   
  
 b complex vitamins tablet Your last dose was: Your next dose is: Take 1 Tab by mouth daily. 1 Tab BREO ELLIPTA 100-25 mcg/dose inhaler Generic drug:  fluticasone-vilanterol Your last dose was: Your next dose is: Take 1 Puff by inhalation daily. 1 Puff  
    
   
   
   
  
 carvedilol 6.25 mg tablet Commonly known as:  Gil Been Your last dose was: Your next dose is: Take 1 Tab by mouth two (2) times daily (with meals). 6.25 mg  
    
   
   
   
  
 cephALEXin 250 mg capsule Commonly known as:  Rodney Pandey Your last dose was: Your next dose is: Take 1 Cap by mouth three (3) times daily for 14 doses. 250 mg  
    
   
   
   
  
 clarithromycin 500 mg tablet Commonly known as:  Rd Small Your last dose was: Your next dose is: Take 500 mg by mouth two (2) times a day. 500 mg  
    
   
   
   
  
 ethambutol 400 mg tablet Commonly known as:  MYAMBUTOL Your last dose was: Your next dose is: Take 800 mg by mouth daily. Indications: pulmonary myobacterial infection 800 mg FLORASTOR 250 mg capsule Generic drug:  Saccharomyces boulardii Your last dose was: Your next dose is: Take 250 mg by mouth two (2) times a day.   
 250 mg  
    
   
   
 loperamide 2 mg capsule Commonly known as:  IMODIUM Your last dose was: Your next dose is: Take 2 mg by mouth as needed for Diarrhea (after every loose stool, maximum of 6 caps/24 hours). 2 mg  
    
   
   
   
  
 losartan-hydroCHLOROthiazide 100-12.5 mg per tablet Commonly known as:  HYZAAR Your last dose was: Your next dose is: Take 1 Tab by mouth daily. Indications: hypertension 1 Tab  
    
   
   
   
  
 meclizine 12.5 mg tablet Commonly known as:  ANTIVERT Your last dose was: Your next dose is: Take 12.5 mg by mouth nightly as needed. Indications: Vertigo 12.5 mg  
    
   
   
   
  
 multivitamin tablet Commonly known as:  ONE A DAY Your last dose was: Your next dose is: Take 1 Tab by mouth daily. 1 Tab PROTONIX 40 mg tablet Generic drug:  pantoprazole Your last dose was: Your next dose is: Take 40 mg by mouth every morning. 40 mg  
    
   
   
   
  
 ROBITUSSIN MUCUS-CHEST CONGEST 100 mg/5 mL liquid Generic drug:  guaiFENesin Your last dose was: Your next dose is: Take 200 mg by mouth every four (4) hours as needed for Cough. 200 mg SYSTANE (PROPYLENE GLYCOL) 0.4-0.3 % Drop Generic drug:  peg 400-propylene glycol Your last dose was: Your next dose is:    
   
   
 1 Drop three (3) times daily. 1 Drop VITAMIN D3 2,000 unit Tab Generic drug:  cholecalciferol (vitamin D3) Your last dose was: Your next dose is: Take 1 Tab by mouth daily. 1 Tab XANAX 0.25 mg tablet Generic drug:  ALPRAZolam  
   
Your last dose was: Your next dose is: Take 0.25 mg by mouth as needed for Anxiety (30-60 min prior to dental procedures).   
 0.25 mg  
    
   
   
   
 * Notice: This list has 2 medication(s) that are the same as other medications prescribed for you. Read the directions carefully, and ask your doctor or other care provider to review them with you. Where to Get Your Medications Information on where to get these meds will be given to you by the nurse or doctor. ! Ask your nurse or doctor about these medications  
  carvedilol 6.25 mg tablet  
 cephALEXin 250 mg capsule Discharge Instructions PATIENT INSTRUCTIONS POST-PACEMAKER IMPLANT 1. No heavy lifting or exercises with the left arm for 4 weeks. This includes the following: Do not raise arm above the shoulder level to comb hair, pull on clothes, etc... Do not use the affected arm to pull up or push up from a seated or laying 
down position. Do not allow anyone else to pull on the affected arm. 2. Remove aquacel dressing in a week. Your incision will have skin glue covering the incision, the skin glue will help to reinforce the incision to prevent it from opening, please DO NOT attempt to peel the glue off of the incision. You do have sutures on the inside of the incision that are not visible. Please DO NOT try to scrub the skin glue off once you are able to take a shower. The skin glue will eventually fall off 3. Do not drive for 3 days 4. Call Dr. Caitlin Garcia at [phone] if you experience any of the following symptoms: 1. Redness at the pacemaker site 2. Swelling at or around the pacemaker or in the left arm 3. Pain around the pacemaker 4. Dizziness, lightheadedness, fainting spells 5. Lack of energy 6. Shortness of breath 7. Rapid heart rate 8. Chest or muscle twitches 5. Follow-up 12/22/17 at 9:45 am  
Future Appointments Date Time Provider Juan Miguel Garcia 12/22/2017 9:45 AM PACEMAKER3, Sherine MOSS  
12/22/2017 10:00 AM Marilynn Warner  [address]  
 
 6. You may use pain medication and ice pack for pain relief as needed. You may wear the sling as a reminder to keep your arm below the your shoulder. Power King M.D. [facility] Electrophysiology/Cardiology [facility] and Vascular [address] [facility] [address] 
[phone] Pt has DDNR [facility] provider to monitor creatinine. [facility] Announcement We are excited to announce that we are making your provider's discharge notes available to you in [facility]. You will see these notes when they are completed and signed by the physician that discharged you from your recent hospital stay. If you have any questions or concerns about any information you see in [facility], please call the Health Information Department where you were seen or reach out to your Primary Care Provider for more information about your plan of care. Introducing [facility]! Mary Lobato introduces [facility] patient portal. Now you can access parts of your medical record, email your doctor's office, and request medication refills online. 1. In your internet browser, go to [email or web address] 2. Click on the First Time User? Click Here link in the Sign In box. You will see the New Member Sign Up page. 3. Enter your [facility] Access Code exactly as it appears below. You will not need to use this code after youve completed the sign-up process. If you do not sign up before the expiration date, you must request a new code. · [facility] Access Code: [email or web address] Expires: 3/14/2018 11:47 AM 
 
4. Enter the last four digits of your Social Security Number (xxxx) and Date of Birth (mm/dd/yyyy) as indicated and click Submit. You will be taken to the next sign-up page. 5. Create a [employer] ID. This will be your [employer] login ID and cannot be changed, so think of one that is secure and easy to remember. 6. Create a [employer] password. You can change your password at any time. 7. Enter your Password Reset Question and Answer. This can be used at a later time if you forget your password. 8. Enter your e-mail address. You will receive e-mail notification when new information is available in [address] 9. Click Sign Up. You can now view and download portions of your medical record. 10. Click the Download Summary menu link to download a portable copy of your medical information. If you have questions, please visit the Frequently Asked Questions section of the [employer] website. Remember, [employer] is NOT to be used for urgent needs. For medical emergencies, dial 911. Now available from your iPhone and Android! Providers Seen During Your Hospitalization Provider Specialty Primary office phone Xavier Timmons MD Emergency Medicine [phone] Angelina Gonzalez MD Cardiology [phone] Your Primary Care Physician (PCP) Primary Care Physician Office Phone Office Fax Lolly Meg [phone] You are allergic to the following No active allergies Recent Documentation Weight Breastfeeding? BMI OB Status Smoking Status 55.2 kg No 20.25 kg/m2 Postmenopausal Never Smoker Emergency Contacts Name Discharge Info Relation Home Work Mobile [address] [facility] CAREGIVER [3] Child [2] [phone] [facility] DISCHARGE CAREGIVER [3] Daughter [21]   [phone] Rodney Connors  Daughter [21] [phone] Patient Belongings The following personal items are in your possession at time of discharge: 
  Dental Appliances: None  Visual Aid: Glasses, With patient      Home Medications: None   Jewelry: Ring, With patient  Clothing:  Footwear, Pants, Shirt, Sweater, Undergarments, Socks, With patient    Other Valuables: None  Personal Items Sent to Safe: n/a Please provide this summary of care documentation to your next provider. Signatures-by signing, you are acknowledging that this After Visit Summary has been reviewed with you and you have received a copy. Patient Signature:  ____________________________________________________________ Date:  ____________________________________________________________  
  
Lisa Wanda Provider Signature:  ____________________________________________________________ Date:  ____________________________________________________________

## 2017-12-12 NOTE — PROGRESS NOTES
1510: TRANSFER - IN REPORT:  Verbal report received from 123  Zabrina Xavier, RN(name) on Chase GONSALEZ Refo  being received from ED(unit) for routine progression of care    Report consisted of patients Situation, Background, Assessment and Recommendations(SBAR). Information from the following report(s) SBAR, Kardex, ED Summary, Intake/Output, MAR, Accordion, Recent Results, Med Rec Status and Cardiac Rhythm Complete heart block was reviewed with the receiving nurse. Opportunity for questions and clarification was provided. Assessment completed upon patients arrival to unit and care assumed. 1550: Patient arrived on unit. Tele-monitor placed and confirmed with monitor tech. 1850: Spoke with Saadia Kan (pts daughter) who consented to pacemaker placement tomorrow morning by Dr. Eric Bellamy: Bedside shift change report given to [address], RN (oncoming nurse) by Uriel Barron RN (offgoing nurse). Report included the following information SBAR, Kardex, ED Summary, Intake/Output, MAR, Accordion, Recent Results, Med Rec Status and Cardiac Rhythm complete heart block. Problem: Falls - Risk of  Goal: *Absence of Falls  Document Viral Fall Risk and appropriate interventions in the flowsheet.    Outcome: Progressing Towards Goal  Fall Risk Interventions:  Mobility Interventions: OT consult for ADLs, Bed/chair exit alarm, Communicate number of staff needed for ambulation/transfer, Patient to call before getting OOB, PT Consult for mobility concerns, PT Consult for assist device competence, Strengthening exercises (ROM-active/passive), Utilize walker, cane, or other assitive device, Utilize gait belt for transfers/ambulation    Mentation Interventions: Adequate sleep, hydration, pain control, Bed/chair exit alarm, Door open when patient unattended, Eyeglasses and hearing aids, Gait belt with transfers/ambulation, More frequent rounding, Reorient patient, Room close to nurse's station    Medication Interventions: Assess postural VS orthostatic hypotension, Bed/chair exit alarm, Patient to call before getting OOB, Teach patient to arise slowly    Elimination Interventions: Bed/chair exit alarm, Call light in reach, Patient to call for help with toileting needs, Toilet paper/wipes in reach, Toileting schedule/hourly rounds             Problem: Pressure Injury - Risk of  Goal: *Prevention of pressure ulcer  Outcome: Progressing Towards Goal   12/12/17 1557   Wound Prevention and Protection Methods   Orientation of Wound Prevention Posterior   Location of Wound Prevention Sacrum/Coccyx   Dressing Present  No   Read Only, Retired: Wound Treatment (non-mechanical)   Wound Offloading (Prevention Methods) Bed, pressure redistribution/air;Bed, pressure reduction mattress;Pillows;Repositioning;Turning       Problem: Pacer/ICD: Pre-Procedure  Goal: Consults, if ordered  Outcome: Progressing Towards Goal  Cardiology consulted  Goal: Diagnostic Test/Procedures  Outcome: Progressing Towards Goal  Labs and EKG completed

## 2017-12-12 NOTE — ED PROVIDER NOTES
HPI Comments: 80 y.o. female with past medical history significant for HTN, kidney stone, mastoiditis, bronchiectasis, and pneumonia who presents from [facility] via EMS with chief complaint of bradycardia. As per EMS, the pt was found to be bradycardic this morning without other sx. EMS found a complete heart block on the pt and she was bradycardic in the 40-50s. As per the daughter, the pt had an episode of vomiting this morning. [place] and the daughter reports that she is at her baseline mentation. The pt has tremors at baseline. The pt has no current complaints. The daughter reports that the pt has chronic exertional SOB. The pt has not had prior cardiac problems or seen a cardiologist in the past. The pt is not on new medications. The pt denies SOB, CP, and fever. There are no other acute medical concerns at this time. Social hx: nonsmoker, no EtOH use  PCP: Sabine Ellsworth MD    Full history, physical exam, and ROS unable to be obtained due to:  Dementia    Note written by Ander Negron, as dictated by Cody Valadez MD 2:21 PM  .      The history is provided by the patient, the EMS personnel and a relative. No  was used. Past Medical History:   Diagnosis Date    Bronchiectasis ([address]     Gastrointestinal disorder     benign tumor in colon 1982    Hx of colonic polyps     Hypertension     Kidney stones     Mastoiditis     Mycobacterium avium infection ([address]     Other ill-defined conditions(799.89)     microbacterium isidra- intracellular lung disease    PNA (pneumonia)     Vertigo        Past Surgical History:   Procedure Laterality Date    ABDOMEN SURGERY PROC UNLISTED      colon tumor removed 1982    HX UROLOGICAL      bladder sling 2007         History reviewed. No pertinent family history.     Social History     Social History    Marital status:      Spouse name: N/A    Number of children: N/A    Years of education: N/A     Occupational History    Not on file. Social History Main Topics    Smoking status: Never Smoker    Smokeless tobacco: Never Used    Alcohol use No    Drug use: No    Sexual activity: Not on file     Other Topics Concern    Not on file     Social History Narrative         ALLERGIES: Review of patient's allergies indicates no known allergies. Review of Systems   Unable to perform ROS: Dementia       Vitals:    12/12/17 1404   BP: 138/61   Pulse: (!) 40   Resp: 18   Temp: 97.7 °F (36.5 °C)   SpO2: 98%            Physical Exam   Constitutional: She appears well-developed and well-nourished. No distress. HENT:   Head: Normocephalic and atraumatic. Nose: Nose normal.   Mouth/Throat: Oropharynx is clear and moist.   Eyes: Conjunctivae and EOM are normal. Pupils are equal, round, and reactive to light. No scleral icterus. Neck: Normal range of motion. Neck supple. No JVD present. No tracheal deviation present. No thyromegaly present. No carotid bruits noted. Cardiovascular: Regular rhythm, normal heart sounds and intact distal pulses. Exam reveals no gallop and no friction rub. No murmur heard. Bradycardic;  Complete heart block on the monitor;  Blood pressure is 136/61 on the monitor;  DP pulses intact;     Pulmonary/Chest: Effort normal and breath sounds normal. No respiratory distress. She has no wheezes. She has no rales. She exhibits no tenderness. Oxygen saturations at 96% on RA;     Abdominal: Soft. Bowel sounds are normal. She exhibits no distension and no mass. There is no tenderness. There is no rebound and no guarding. Musculoskeletal: Normal range of motion. She exhibits no edema or tenderness. Lymphadenopathy:     She has no cervical adenopathy. Neurological: She is alert. She has normal reflexes. No cranial nerve deficit. Coordination normal.   Baseline tremors;     Skin: Skin is warm and dry. No rash noted. No erythema. There is pallor (mild). Psychiatric: She has a normal mood and affect. Her behavior is normal. Judgment and thought content normal.   Nursing note and vitals reviewed. Note written by Ander Lehman, as dictated by Amanda Rawls MD 2:23 PM    MDM  Number of Diagnoses or Management Options  Complete heart block [facility]): new and requires workup     Amount and/or Complexity of Data Reviewed  Clinical lab tests: ordered and reviewed  Tests in the radiology section of CPT®: ordered and reviewed  Decide to obtain previous medical records or to obtain history from someone other than the patient: yes  Review and summarize past medical records: yes  Discuss the patient with other providers: yes  Independent visualization of images, tracings, or specimens: yes    Risk of Complications, Morbidity, and/or Mortality  Presenting problems: high  Diagnostic procedures: high  Management options: high    Patient Progress  Patient progress: stable    ED Course       Procedures  ED EKG interpretation:  Rhythm: sinus bradycardia; and irregular. Rate (approx.): 41; Axis: L axis shift; ST/T wave: non-specific changes; complete heart block with a atrial rate of 60 and a ventricular rate of 41; incomplete RBBB  Note written by Ander Lehman, as dictated by Amanda Rawls MD 1:58 PM    CONSULT NOTE:  2:56 PM Amanda Rawls MD spoke with Dr. Bony Carney MD, Consult for Cardiology. Discussed available diagnostic tests and clinical findings. He is in agreement with care plans as outlined. He will admit the pt.

## 2017-12-12 NOTE — ED TRIAGE NOTES
Triage note: Patient arrives via EMS from [facility] secondary to bradycardia, first noticed this morning, no history of such, asymptomatic. Per EMS complete heart block, /90 per EMS.

## 2017-12-12 NOTE — CONSULTS
Cardiac Electrophysiology Consultation Note     Subjective:      Keven Dow is a 80 y.o. female patient who is seen for evaluation of 3rd degree AV block   She is kindly referred by Dr. Rd Garcia. She lives at [facility] and she lives in the independent living with nursing aide assistance for ADLs and medications. Her daughter is at bedside. This morning when she woke up she was not feeling well,  + N/V. She say the in-house PCP Dr Mich Harper. Further work up revealed third degree AV block. She denies lightheadedness, dizziness, SOB or chest pain. No syncope.  + SOB when she sat up for examination. Recent fall  last week while sitting down she missed the chair. She uses a rolling walker to ambulate. She does bear a significant amount of weight on both arms to get out of the chair. Her daughter is concerned about her remembering to keep her left arm down post procedure. PMHx includes bronchiectasis, hx of MAC, dementia, hypertension and GERD. No family hx of pacemaker. Patient Active Problem List    Diagnosis Date Noted    Complete heart block ([facility] [address] 12/12/2017    Healthcare-associated pneumonia 01/27/2016     Current Facility-Administered Medications   Medication Dose Route Frequency Provider Last Rate Last Dose    albuterol-ipratropium (DUO-NEB) 2.5 MG-0.5 MG/3 ML  3 mL Nebulization Q6H PRN Cristhian Gabriela. DONALD Bruno        albuterol-ipratropium (DUO-NEB) 2.5 MG-0.5 MG/3 ML  3 mL Nebulization BID [place] Gabriela. DONALD Bruno        guaiFENesin (ROBITUSSIN) 100 mg/5 mL oral liquid 200 mg  200 mg Oral Q4H PRN [place] Gab[place]ela. DONALD Bruno        acetaminophen (TYLENOL) tablet 650 mg  650 mg Oral Q4H PRN Cristhian Gabriela. DONALD Bruno        [START ON 12/13/2017] pantoprazole (PROTONIX) tablet 40 mg  40 mg Oral 7am Cristhian Gabriela.  DONALD Bruno        losartan (COZAAR) tablet 100 mg  100 mg Oral DAILY Stephanie Ford MD        And    hydroCHLOROthiazide (HYDRODIURIL) tablet 12.5 mg  12.5 mg Oral DAILY Stephanie Ford MD Current Outpatient Prescriptions   Medication Sig Dispense Refill    ALPRAZolam (XANAX) 0.25 mg tablet Take 0.25 mg by mouth as needed for Anxiety (30-60 min prior to dental procedures).  fluticasone-vilanterol (BREO ELLIPTA) 100-25 mcg/dose inhaler Take 1 Puff by inhalation daily.  clarithromycin (BIAXIN) 500 mg tablet Take 500 mg by mouth two (2) times a day.  albuterol-ipratropium (DUO-NEB) 2.5 mg-0.5 mg/3 ml nebu 3 mL by Nebulization route every six (6) hours as needed.  albuterol-ipratropium (DUO-NEB) 2.5 mg-0.5 mg/3 ml nebu 3 mL by Nebulization route two (2) times a day.  ethambutol (MYAMBUTOL) 400 mg tablet Take 800 mg by mouth daily. Indications: pulmonary myobacterial infection      Saccharomyces boulardii (FLORASTOR) 250 mg capsule Take 250 mg by mouth two (2) times a day.  loperamide (IMODIUM) 2 mg capsule Take 2 mg by mouth as needed for Diarrhea (after every loose stool, maximum of 6 caps/24 hours).  losartan-hydroCHLOROthiazide (HYZAAR) 100-12.5 mg per tablet Take 1 Tab by mouth daily. Indications: hypertension      meclizine (ANTIVERT) 12.5 mg tablet Take 12.5 mg by mouth nightly as needed. Indications: Vertigo      metoprolol succinate (TOPROL-XL) 25 mg XL tablet Take 25 mg by mouth daily. Indications: hypertension      multivitamin (ONE A DAY) tablet Take 1 Tab by mouth daily.  pantoprazole (PROTONIX) 40 mg tablet Take 40 mg by mouth every morning.  guaiFENesin (ROBITUSSIN MUCUS-CHEST CONGEST) 100 mg/5 mL liquid Take 200 mg by mouth every four (4) hours as needed for Cough.  peg 400-propylene glycol (SYSTANE, PROPYLENE GLYCOL,) 0.4-0.3 % drop 1 Drop three (3) times daily.  b complex vitamins tablet Take 1 Tab by mouth daily.  cholecalciferol, vitamin D3, (VITAMIN D3) 2,000 unit tab Take 1 Tab by mouth daily.  acetaminophen (TYLENOL) 325 mg tablet Take 2 Tabs by mouth every four (4) hours as needed.  30 Tab 0     No Known Allergies  Past Medical History:   Diagnosis Date    Bron[address]ctasis ([facility] [address]     Gastrointestinal disorder     benign tumor in colon 1982    Hx of colonic polyps     Hypertension     Kidney stones     Mastoiditis     Mycobacterium avium infection ([facility] [address] [facility]     Other ill-defined conditions(799.89)     microbacterium isidra- intracellular lung disease    PNA (pneumonia)     Vertigo      Past Surgical History:   Procedure Laterality Date    ABDOMEN SURGERY PROC UNLISTED      colon tumor removed 1982    HX UROLOGICAL      bladder sling 2007       Social History   Substance Use Topics    Smoking status: Never Smoker    Smokeless tobacco: Never Used    Alcohol use No        Review of Systems:   Constitutional: Negative for fever, chills, weight loss,  malaise/fatigue. HEENT: +cough, no hemoptysis, sputum production, and wheezing. Cardiovascular: Negative for chest pain, palpitations, orthopnea, claudication, leg swelling, syncope, and PND. Gastrointestinal:+ nausea, +vomiting, no diarrhea, constipation, blood in stool and melena. Genitourinary: Negative for dysuria, and hematuria. Musculoskeletal: Negative for myalgias, arthralgia. Skin: Negative for rash. Heme: Does not bleed or bruise easily. Neurological: Negative for speech change and focal weakness     Objective:     Visit Vitals    /61    Pulse (!) 38    Temp 97.7 °F (36.5 °C)    Resp 17    SpO2 96%      Physical Exam:   Constitutional: well-developed. No distress. Head: Normocephalic and atraumatic. Eyes: Pupils are equal, round  Neck: supple. No JVD present. Cardiovascular: slow rate, regular rhythm. Exam reveals no gallop and no friction rub. No murmur heard. Pulmonary/Chest: Effort normal and rhonchi bilaterally. No wheezes. Abdominal: Soft, no tenderness. Musculoskeletal: +1 LLE edema. Neurological: alert, oriented. Skin: Skin is warm and dry  Psychiatric: normal mood and affect.  Behavior is normal. Judgment and thought content normal.      EKG: Marked bradycardia (slow ventricular rate) with third degree av block, sinus rhythm  Chest Xray 12/12/17 Mild bibasilar patchy air space disease. WBC 13.1    Assessment/Plan:   Irreversible third degree AV block. -on small dose of Toprol (25mg), last dose toprol this AM  -TSH WNL  -Echo pending   -tentatively schedule for pacemaker tomorrow morning. NPO after midnight except medications. -WBC 13.1, Nabeel Chan NP ordered urinalysis w/ reflex culture  -long discussion with daughter about arm restrictions, she may need a higher level of care post pacemaker   The concern is her remembering to keep her arm below the shoulder level and using her arms to get out of the chair. She is weak and need help to get up  -Discussed with the patient and daughter:  - Procedure details  - Post procedure wound care and arm restrictions  - 2 week follow up - wound and device check   - Prophylactic antibiotic for 5 days post procedure  - Pain medication and ice pack for pain relief     Hypertension: controlled, cozaar/HCTZ ordered    REJI,pre renal: creatinine 1.27. Bronchiectasis: Mild bibasilar patchy air space disease on chest xray, on chronic antibiotics. Followed by [facility] pulmonology. Thank you for involving me in this patient's care and please call with further concerns or questions. Tony Bueno NP  [phone]  [facility] from  attending:   I have seen, examined patient, and discussed with nurse practitioner, registered nurse, reviewed, updated note and agree with the assessment and plan    I have talked to her and her daughter tonight    She feels weak  Vital signs with slow rate and still in third degree av block  Exam shows regular rhythm and no rub, slow rate     Assessment and Plan:  She meets indication for dual chamber pacemaker.   She has irreversible conduction disease (previous ECG she has LBBB) and now third degree av block  She and her daughter want to proceed  Risks involve device implant include but are not limited to bleeding, infection, valvular damage, heart attack, stroke, lung collapse (pneumothorax or hemothorax), heart collapse (pericardial tamponade), heart perforation, kidney failure, death. Elective or emergency surgery may be required to repair some of these complications. Prolonged hospitalization would be required. General anesthesia may be required for the procedure.

## 2017-12-13 ENCOUNTER — APPOINTMENT (OUTPATIENT)
Dept: GENERAL RADIOLOGY | Age: 82
DRG: 244 | End: 2017-12-13
Attending: NURSE PRACTITIONER
Payer: MEDICARE

## 2017-12-13 PROBLEM — Z95.0 S/P CARDIAC PACEMAKER PROCEDURE: Status: ACTIVE | Noted: 2017-12-13

## 2017-12-13 LAB
ANION GAP SERPL CALC-SCNC: 9 MMOL/L (ref 5–15)
ATRIAL RATE: 41 BPM
BUN SERPL-MCNC: 30 MG/DL (ref 6–20)
BUN/CREAT SERPL: 27 (ref 12–20)
CALCIUM SERPL-MCNC: 9.1 MG/DL (ref 8.5–10.1)
CALCULATED P AXIS, ECG09: 56 DEGREES
CALCULATED R AXIS, ECG10: -61 DEGREES
CALCULATED T AXIS, ECG11: 23 DEGREES
CHLORIDE SERPL-SCNC: 108 MMOL/L (ref 97–108)
CO2 SERPL-SCNC: 24 MMOL/L (ref 21–32)
CREAT SERPL-MCNC: 1.13 MG/DL (ref 0.55–1.02)
DIAGNOSIS, 93000: NORMAL
GLUCOSE SERPL-MCNC: 96 MG/DL (ref 65–100)
POTASSIUM SERPL-SCNC: 4.2 MMOL/L (ref 3.5–5.1)
Q-T INTERVAL, ECG07: 558 MS
QRS DURATION, ECG06: 130 MS
QTC CALCULATION (BEZET), ECG08: 460 MS
SODIUM SERPL-SCNC: 141 MMOL/L (ref 136–145)
VENTRICULAR RATE, ECG03: 41 BPM

## 2017-12-13 PROCEDURE — 36415 COLL VENOUS BLD VENIPUNCTURE: CPT | Performed by: NURSE PRACTITIONER

## 2017-12-13 PROCEDURE — 99153 MOD SED SAME PHYS/QHP EA: CPT

## 2017-12-13 PROCEDURE — 74011000250 HC RX REV CODE- 250: Performed by: INTERNAL MEDICINE

## 2017-12-13 PROCEDURE — 74011250636 HC RX REV CODE- 250/636: Performed by: INTERNAL MEDICINE

## 2017-12-13 PROCEDURE — C1785 PMKR, DUAL, RATE-RESP: HCPCS

## 2017-12-13 PROCEDURE — 94640 AIRWAY INHALATION TREATMENT: CPT

## 2017-12-13 PROCEDURE — 80048 BASIC METABOLIC PNL TOTAL CA: CPT | Performed by: NURSE PRACTITIONER

## 2017-12-13 PROCEDURE — 71010 XR CHEST PORT: CPT

## 2017-12-13 PROCEDURE — 02H63JZ INSERTION OF PACEMAKER LEAD INTO RIGHT ATRIUM, PERCUTANEOUS APPROACH: ICD-10-PCS | Performed by: INTERNAL MEDICINE

## 2017-12-13 PROCEDURE — 93306 TTE W/DOPPLER COMPLETE: CPT

## 2017-12-13 PROCEDURE — 77030012935 HC DRSG AQUACEL BMS -B

## 2017-12-13 PROCEDURE — 65660000000 HC RM CCU STEPDOWN

## 2017-12-13 PROCEDURE — A4565 SLINGS: HCPCS

## 2017-12-13 PROCEDURE — 74011636320 HC RX REV CODE- 636/320: Performed by: INTERNAL MEDICINE

## 2017-12-13 PROCEDURE — C1892 INTRO/SHEATH,FIXED,PEEL-AWAY: HCPCS

## 2017-12-13 PROCEDURE — 74011000250 HC RX REV CODE- 250: Performed by: SPECIALIST

## 2017-12-13 PROCEDURE — 0JH606Z INSERTION OF PACEMAKER, DUAL CHAMBER INTO CHEST SUBCUTANEOUS TISSUE AND FASCIA, OPEN APPROACH: ICD-10-PCS | Performed by: INTERNAL MEDICINE

## 2017-12-13 PROCEDURE — 74011250637 HC RX REV CODE- 250/637: Performed by: NURSE PRACTITIONER

## 2017-12-13 PROCEDURE — C1898 LEAD, PMKR, OTHER THAN TRANS: HCPCS

## 2017-12-13 PROCEDURE — 97530 THERAPEUTIC ACTIVITIES: CPT

## 2017-12-13 PROCEDURE — 97161 PT EVAL LOW COMPLEX 20 MIN: CPT

## 2017-12-13 PROCEDURE — 77030018547 HC SUT ETHBND1 J&J -B

## 2017-12-13 PROCEDURE — 74011000272 HC RX REV CODE- 272: Performed by: INTERNAL MEDICINE

## 2017-12-13 PROCEDURE — 74011000250 HC RX REV CODE- 250: Performed by: NURSE PRACTITIONER

## 2017-12-13 PROCEDURE — 77030031139 HC SUT VCRL2 J&J -A

## 2017-12-13 PROCEDURE — 02HK3JZ INSERTION OF PACEMAKER LEAD INTO RIGHT VENTRICLE, PERCUTANEOUS APPROACH: ICD-10-PCS | Performed by: INTERNAL MEDICINE

## 2017-12-13 PROCEDURE — 74011250636 HC RX REV CODE- 250/636: Performed by: NURSE PRACTITIONER

## 2017-12-13 PROCEDURE — 77030029684 HC NEB SM VOL KT MONA -A

## 2017-12-13 RX ORDER — CEPHALEXIN 250 MG/1
250 CAPSULE ORAL 3 TIMES DAILY
Status: DISCONTINUED | OUTPATIENT
Start: 2017-12-14 | End: 2017-12-14 | Stop reason: HOSPADM

## 2017-12-13 RX ORDER — SODIUM CHLORIDE 0.9 % (FLUSH) 0.9 %
5-10 SYRINGE (ML) INJECTION AS NEEDED
Status: DISCONTINUED | OUTPATIENT
Start: 2017-12-13 | End: 2017-12-13

## 2017-12-13 RX ORDER — SODIUM CHLORIDE 0.9 % (FLUSH) 0.9 %
5-10 SYRINGE (ML) INJECTION EVERY 8 HOURS
Status: DISCONTINUED | OUTPATIENT
Start: 2017-12-13 | End: 2017-12-14 | Stop reason: HOSPADM

## 2017-12-13 RX ORDER — CEFAZOLIN SODIUM/WATER 2 G/20 ML
2 SYRINGE (ML) INTRAVENOUS EVERY 8 HOURS
Status: COMPLETED | OUTPATIENT
Start: 2017-12-13 | End: 2017-12-13

## 2017-12-13 RX ORDER — HYDROCODONE BITARTRATE AND ACETAMINOPHEN 5; 325 MG/1; MG/1
1 TABLET ORAL
Status: DISCONTINUED | OUTPATIENT
Start: 2017-12-13 | End: 2017-12-14 | Stop reason: HOSPADM

## 2017-12-13 RX ORDER — SODIUM CHLORIDE 9 MG/ML
500 INJECTION, SOLUTION INTRAVENOUS ONCE
Status: COMPLETED | OUTPATIENT
Start: 2017-12-13 | End: 2017-12-13

## 2017-12-13 RX ORDER — ARFORMOTEROL TARTRATE 15 UG/2ML
15 SOLUTION RESPIRATORY (INHALATION)
Status: DISCONTINUED | OUTPATIENT
Start: 2017-12-13 | End: 2017-12-14 | Stop reason: HOSPADM

## 2017-12-13 RX ORDER — NALOXONE HYDROCHLORIDE 0.4 MG/ML
0.4 INJECTION, SOLUTION INTRAMUSCULAR; INTRAVENOUS; SUBCUTANEOUS AS NEEDED
Status: DISCONTINUED | OUTPATIENT
Start: 2017-12-13 | End: 2017-12-14 | Stop reason: HOSPADM

## 2017-12-13 RX ORDER — VANCOMYCIN HYDROCHLORIDE 1 G/20ML
1000 INJECTION, POWDER, LYOPHILIZED, FOR SOLUTION INTRAVENOUS ONCE
Status: COMPLETED | OUTPATIENT
Start: 2017-12-13 | End: 2017-12-13

## 2017-12-13 RX ORDER — LIDOCAINE HYDROCHLORIDE 10 MG/ML
10-40 INJECTION INFILTRATION; PERINEURAL AS NEEDED
Status: DISCONTINUED | OUTPATIENT
Start: 2017-12-13 | End: 2017-12-13

## 2017-12-13 RX ORDER — MIDAZOLAM HYDROCHLORIDE 1 MG/ML
.5-1 INJECTION, SOLUTION INTRAMUSCULAR; INTRAVENOUS
Status: DISCONTINUED | OUTPATIENT
Start: 2017-12-13 | End: 2017-12-13

## 2017-12-13 RX ORDER — SODIUM CHLORIDE 0.9 % (FLUSH) 0.9 %
5-10 SYRINGE (ML) INJECTION AS NEEDED
Status: DISCONTINUED | OUTPATIENT
Start: 2017-12-13 | End: 2017-12-14 | Stop reason: HOSPADM

## 2017-12-13 RX ORDER — ATROPINE SULFATE 0.1 MG/ML
1 INJECTION INTRAVENOUS AS NEEDED
Status: DISCONTINUED | OUTPATIENT
Start: 2017-12-13 | End: 2017-12-13

## 2017-12-13 RX ORDER — BUDESONIDE 0.5 MG/2ML
500 INHALANT ORAL
Status: DISCONTINUED | OUTPATIENT
Start: 2017-12-13 | End: 2017-12-14 | Stop reason: HOSPADM

## 2017-12-13 RX ORDER — FENTANYL CITRATE 50 UG/ML
25-200 INJECTION, SOLUTION INTRAMUSCULAR; INTRAVENOUS
Status: DISCONTINUED | OUTPATIENT
Start: 2017-12-13 | End: 2017-12-13

## 2017-12-13 RX ORDER — CARVEDILOL 6.25 MG/1
6.25 TABLET ORAL 2 TIMES DAILY WITH MEALS
Status: DISCONTINUED | OUTPATIENT
Start: 2017-12-13 | End: 2017-12-14 | Stop reason: HOSPADM

## 2017-12-13 RX ADMIN — CARVEDILOL 6.25 MG: 6.25 TABLET, FILM COATED ORAL at 13:08

## 2017-12-13 RX ADMIN — CLARITHROMYCIN 500 MG: 500 TABLET ORAL at 17:09

## 2017-12-13 RX ADMIN — FENTANYL CITRATE 25 MCG: 50 INJECTION, SOLUTION INTRAMUSCULAR; INTRAVENOUS at 07:35

## 2017-12-13 RX ADMIN — SODIUM CHLORIDE 50000 UNITS: 900 IRRIGANT IRRIGATION at 08:04

## 2017-12-13 RX ADMIN — IOPAMIDOL 10 ML: 755 INJECTION, SOLUTION INTRAVENOUS at 07:48

## 2017-12-13 RX ADMIN — ETHAMBUTOL HYDROCHLORIDE 800 MG: 400 TABLET, FILM COATED ORAL at 10:42

## 2017-12-13 RX ADMIN — IPRATROPIUM BROMIDE AND ALBUTEROL SULFATE 3 ML: .5; 3 SOLUTION RESPIRATORY (INHALATION) at 19:53

## 2017-12-13 RX ADMIN — Medication 10 ML: at 13:03

## 2017-12-13 RX ADMIN — SODIUM CHLORIDE 500 ML: 900 INJECTION, SOLUTION INTRAVENOUS at 07:49

## 2017-12-13 RX ADMIN — CARVEDILOL 6.25 MG: 6.25 TABLET, FILM COATED ORAL at 20:22

## 2017-12-13 RX ADMIN — VANCOMYCIN HYDROCHLORIDE 1000 MG: 1 INJECTION, POWDER, LYOPHILIZED, FOR SOLUTION INTRAVENOUS at 08:05

## 2017-12-13 RX ADMIN — CLARITHROMYCIN 500 MG: 500 TABLET ORAL at 10:44

## 2017-12-13 RX ADMIN — Medication 10 ML: at 23:06

## 2017-12-13 RX ADMIN — Medication 2 G: at 23:06

## 2017-12-13 RX ADMIN — MIDAZOLAM HYDROCHLORIDE 1 MG: 1 INJECTION, SOLUTION INTRAMUSCULAR; INTRAVENOUS at 07:35

## 2017-12-13 RX ADMIN — BUDESONIDE 500 MCG: 0.5 INHALANT RESPIRATORY (INHALATION) at 19:53

## 2017-12-13 RX ADMIN — Medication 2 G: at 14:36

## 2017-12-13 RX ADMIN — Medication 1 CAPSULE: at 10:43

## 2017-12-13 RX ADMIN — BUDESONIDE 500 MCG: 0.5 INHALANT RESPIRATORY (INHALATION) at 13:12

## 2017-12-13 RX ADMIN — Medication 2 G: at 07:23

## 2017-12-13 RX ADMIN — FENTANYL CITRATE 25 MCG: 50 INJECTION, SOLUTION INTRAMUSCULAR; INTRAVENOUS at 07:47

## 2017-12-13 RX ADMIN — LIDOCAINE HYDROCHLORIDE 30 ML: 10 INJECTION, SOLUTION INFILTRATION; PERINEURAL at 07:44

## 2017-12-13 RX ADMIN — Medication 10 ML: at 10:49

## 2017-12-13 RX ADMIN — Medication 10 ML: at 07:36

## 2017-12-13 RX ADMIN — POLYVINYL ALCOHOL 1 DROP: 14 SOLUTION/ DROPS OPHTHALMIC at 10:48

## 2017-12-13 RX ADMIN — HYDROCHLOROTHIAZIDE 12.5 MG: 25 TABLET ORAL at 10:43

## 2017-12-13 RX ADMIN — PANTOPRAZOLE SODIUM 40 MG: 40 TABLET, DELAYED RELEASE ORAL at 10:51

## 2017-12-13 RX ADMIN — IPRATROPIUM BROMIDE AND ALBUTEROL SULFATE 3 ML: .5; 3 SOLUTION RESPIRATORY (INHALATION) at 13:11

## 2017-12-13 RX ADMIN — POLYVINYL ALCOHOL 1 DROP: 14 SOLUTION/ DROPS OPHTHALMIC at 17:09

## 2017-12-13 RX ADMIN — POLYVINYL ALCOHOL 1 DROP: 14 SOLUTION/ DROPS OPHTHALMIC at 23:06

## 2017-12-13 RX ADMIN — LOSARTAN POTASSIUM 100 MG: 50 TABLET ORAL at 10:51

## 2017-12-13 NOTE — PROGRESS NOTES
Patient admitted from [address] [facility] care unit - met with daughter - patient uses a rollator for ambulation - confirmed PCP is Dr. Valeri Osgood who sees patient at facility -nursing staff at facility provide administration of patients meds- spoke with MARU Watkins at [facility] to discuss option of [facility] vs [facility]. and will await PT notes in am to determine disposition - may discuss with family about option of private duty due to need for supervision for arm in sling - discussed transportation options and daughter would like [facility] to transport by w/[facility] with family to ride with patient- CM will await PT notes in am at this time - anticipate possible discharge in the am.  ASHLEIGH James    Care Management Interventions  PCP Verified by CM:  Yes (Dr Valeri Osgood is  PCP at facility)  Transition of Care Consult (CM Consult): SNF (SNF vs FPC with PT/OT )  Partner SNF: No  Reason Why Partner SNF Not Chosen: Positive previous encounter  MyChart Signup: No  Discharge Durable Medical Equipment: No (uses rollator and jet nebulizer )  Physical Therapy Consult: Yes  Occupational Therapy Consult: No  Speech Therapy Consult: No  Current Support Network: Assisted Living (Resides at [facility])  Confirm Follow Up Transport: Other (see comment) ([facility] w/[facility])  Plan discussed with Pt/Family/Caregiver: Yes  Freedom of Choice Offered: Yes  Discharge Location  Discharge Placement: Skilled nursing facility (SNF vs return to [facility] with PT/OT)

## 2017-12-13 NOTE — PROGRESS NOTES
Cardiology Progress Note            Admit Date: 12/12/2017  Admit Diagnosis: Complete heart block ([address]  Date: 12/13/2017     Time: 11:00 AM    HPI: Pt admitted 12/12/17 from [facility] with complete heart block. Has PMH HTN, kidney stone,GERD,  mastoiditis, vertigo, bronchiectasis, MAC and pneumonia. She was admitted and undewent PPM by Dr. Adan Chase today. Assessment and Plan     1. Complete heart block: now s/p Biotronik PPM insertion  -Currently off home Toprol XL- will d/w Dr. Shelia Oliveira- re: ?resume now that pt has pacemaker.  -Lytes ok  -TTE: EF 65%, NWMA, Trivial MR,   -Post procedural antibx per EP (ancef)    2. Hx of HTN:  Currently hypertensive   -losartan HCTZ 100-12.5 daily (home med) -   -Add coreg 6.25 mg BID (no amlodipine due to interaction with clarithromycin)     3. Leukocytosis:  WBC 13. 1.on admission  -Repeat CBC pending  -UA + leukocyte esterase, Neg. Nitrite. WBC 20-50, no bacteria. ?contaminent- await cx. Pt is currently on Ancef for PPM ppx. and will be changed to keflex tomorrow. -CXR with Mild bibasilar patchy air space disease     4. Elevated creatinine: trending down 1.13 from 1.27 s/p IV hydration.  -Recheck BMP in a.m.     5. Hx of MAC  -Continue home clarithromycin and ethambutol     6. Hx of bronchiectasis: on home nebs.     7. Hx of dementia. 8. Advanced directives. Pt has DDNR. Discussed with pt's daughter- agreeable to resume/reinstate DDNR post procedure. Pt with CHB on presentation, now s/p successful PPM implantation. Will f/u Urine culture, CBC. PT eval ordered. Case management following- Iman mccarty to assess ability to care for patient at discharge given LUE restrictions. Possible DC tomorrow. Further plan per Dr. Shelia Oliveira  Cardiology Attending:Patient seen and examined. I agree with NP assessment and plans. Adding meds for BP.     Gary Guajardo MD 12/13/2017 1:57 PM Subjective:   Trav Noriega is pleasantly confused. No complaints. Objective:      Physical Exam:                Visit Vitals    BP (!) 176/99 (BP 1 Location: Right arm, BP Patient Position: At rest;Head of bed elevated (Comment degrees))    Pulse 73    Temp 98.1 °F (36.7 °C)    Resp 18    Wt 55.3 kg (121 lb 14.6 oz)    SpO2 98%    Breastfeeding No    BMI 20.29 kg/m2          General Appearance:   Well developed, well nourished,alert and oriented x 3, and   individual in no acute distress. Ears/Nose/Mouth/Throat:    Hearing grossly normal.         Neck:  Supple. Chest:    Lungs clear to auscultation bilaterally, diminished bases. No use of accessory muscles  Chest wall:  Left upper chest with dressing c/d/i, ice to Left upper chest PPM site. Left upper extremity in sling. Cardiovascular:   Regular rate and rhythm, S1, S2 normal, no murmur. Abdomen:    Soft, non-tender, bowel sounds are active. Extremities:  No edema bilaterally. Skin:  Warm and dry. Telemetry: 100% ventricular paced          Data Review:    Labs:    Recent Results (from the past 24 hour(s))   EKG, 12 LEAD, INITIAL    Collection Time: 12/12/17  1:58 PM   Result Value Ref Range    Ventricular Rate 41 BPM    Atrial Rate 41 BPM    QRS Duration 130 ms    Q-T Interval 558 ms    QTC Calculation (Bezet) 460 ms    Calculated P Axis 56 degrees    Calculated R Axis -61 degrees    Calculated T Axis 23 degrees    Diagnosis       Marked sinus bradycardia with AV dissociation and Wide QRS rhythm  Left axis deviation  Nonspecific intraventricular block  When compared with ECG of 21-JAN-2016 12:33,  Wide QRS rhythm has replaced Sinus rhythm  Vent.  rate has decreased BY  46 BPM     CBC WITH AUTOMATED DIFF    Collection Time: 12/12/17  2:10 PM   Result Value Ref Range    WBC 13.1 (H) 3.6 - 11.0 K/uL    RBC 4.23 3.80 - 5.20 M/uL    HGB 12.9 11.5 - 16.0 g/dL    HCT 38.8 35.0 - 47.0 %    MCV 91.7 80.0 - 99.0 FL    MCH 30.5 26.0 - 34.0 PG    MCHC 33.2 30.0 - 36.5 g/dL    RDW 13.6 11.5 - 14.5 %    PLATELET 676 235 - 821 K/uL    NEUTROPHILS 88 (H) 32 - 75 %    LYMPHOCYTES 4 (L) 12 - 49 %    MONOCYTES 4 (L) 5 - 13 %    EOSINOPHILS 4 0 - 7 %    BASOPHILS 0 0 - 1 %    ABS. NEUTROPHILS 11.6 (H) 1.8 - 8.0 K/UL    ABS. LYMPHOCYTES 0.5 (L) 0.8 - 3.5 K/UL    ABS. MONOCYTES 0.5 0.0 - 1.0 K/UL    ABS. EOSINOPHILS 0.5 (H) 0.0 - 0.4 K/UL    ABS. BASOPHILS 0.0 0.0 - 0.1 K/UL    DF MANUAL      RBC COMMENTS NORMOCYTIC, NORMOCHROMIC     METABOLIC PANEL, COMPREHENSIVE    Collection Time: 12/12/17  2:10 PM   Result Value Ref Range    Sodium 138 136 - 145 mmol/L    Potassium 4.4 3.5 - 5.1 mmol/L    Chloride 105 97 - 108 mmol/L    CO2 25 21 - 32 mmol/L    Anion gap 8 5 - 15 mmol/L    Glucose 114 (H) 65 - 100 mg/dL    BUN 39 (H) 6 - 20 MG/DL    Creatinine 1.27 (H) 0.55 - 1.02 MG/DL    BUN/Creatinine ratio 31 (H) 12 - 20      GFR est AA 48 (L) >60 ml/min/1.73m2    GFR est non-AA 39 (L) >60 ml/min/1.73m2    Calcium 9.2 8.5 - 10.1 MG/DL    Bilirubin, total 0.5 0.2 - 1.0 MG/DL    ALT (SGPT) 24 12 - 78 U/L    AST (SGOT) 16 15 - 37 U/L    Alk.  phosphatase 86 45 - 117 U/L    Protein, total 6.9 6.4 - 8.2 g/dL    Albumin 3.6 3.5 - 5.0 g/dL    Globulin 3.3 2.0 - 4.0 g/dL    A-G Ratio 1.1 1.1 - 2.2     CK W/ CKMB & INDEX    Collection Time: 12/12/17  2:10 PM   Result Value Ref Range     26 - 192 U/L    CK - MB 4.4 (H) <3.6 NG/ML    CK-MB Index 3.2 (H) 0 - 2.5     TROPONIN I    Collection Time: 12/12/17  2:10 PM   Result Value Ref Range    Troponin-I, Qt. <0.04 <0.05 ng/mL   TSH 3RD GENERATION    Collection Time: 12/12/17  2:10 PM   Result Value Ref Range    TSH 2.48 0.36 - 3.74 uIU/mL   MAGNESIUM    Collection Time: 12/12/17  2:10 PM   Result Value Ref Range    Magnesium 2.4 1.6 - 2.4 mg/dL   URINALYSIS W/ REFLEX CULTURE    Collection Time: 12/12/17  6:04 PM   Result Value Ref Range    Color YELLOW/STRAW      Appearance CLOUDY (A) CLEAR      Specific gravity 1.021 1.003 - 1.030      pH (UA) 5.5 5.0 - 8.0      Protein NEGATIVE  NEG mg/dL    Glucose NEGATIVE  NEG mg/dL    Ketone NEGATIVE  NEG mg/dL    Bilirubin NEGATIVE  NEG      Blood NEGATIVE  NEG      Urobilinogen 0.2 0.2 - 1.0 EU/dL    Nitrites NEGATIVE  NEG      Leukocyte Esterase MODERATE (A) NEG      WBC 20-50 0 - 4 /hpf    RBC 0-5 0 - 5 /hpf    Epithelial cells FEW FEW /lpf    Bacteria NEGATIVE  NEG /hpf    UA:UC IF INDICATED URINE CULTURE ORDERED (A) CNI      Hyaline cast 0-2 0 - 5 /lpf   METABOLIC PANEL, BASIC    Collection Time: 12/13/17  4:47 AM   Result Value Ref Range    Sodium 141 136 - 145 mmol/L    Potassium 4.2 3.5 - 5.1 mmol/L    Chloride 108 97 - 108 mmol/L    CO2 24 21 - 32 mmol/L    Anion gap 9 5 - 15 mmol/L    Glucose 96 65 - 100 mg/dL    BUN 30 (H) 6 - 20 MG/DL    Creatinine 1.13 (H) 0.55 - 1.02 MG/DL    BUN/Creatinine ratio 27 (H) 12 - 20      GFR est AA 54 (L) >60 ml/min/1.73m2    GFR est non-AA 45 (L) >60 ml/min/1.73m2    Calcium 9.1 8.5 - 10.1 MG/DL          Radiology:        Current Facility-Administered Medications   Medication Dose Route Frequency    sodium chloride (NS) flush 5-10 mL  5-10 mL IntraVENous Q8H    sodium chloride (NS) flush 5-10 mL  5-10 mL IntraVENous PRN    naloxone (NARCAN) injection 0.4 mg  0.4 mg IntraVENous PRN    ceFAZolin (ANCEF) 2 g/20 mL in sterile water IV syringe  2 g IntraVENous Q8H    HYDROcodone-acetaminophen (NORCO) 5-325 mg per tablet 1 Tab  1 Tab Oral Q6H PRN    albuterol-ipratropium (DUO-NEB) 2.5 MG-0.5 MG/3 ML  3 mL Nebulization Q6H PRN    guaiFENesin (ROBITUSSIN) 100 mg/5 mL oral liquid 200 mg  200 mg Oral Q4H PRN    acetaminophen (TYLENOL) tablet 650 mg  650 mg Oral Q4H PRN    pantoprazole (PROTONIX) tablet 40 mg  40 mg Oral 7am    losartan (COZAAR) tablet 100 mg  100 mg Oral DAILY    And    hydroCHLOROthiazide (HYDRODIURIL) tablet 12.5 mg  12.5 mg Oral DAILY    polyvinyl alcohol (LIQUIFILM TEARS) 1.4 % ophthalmic solution 1 Drop  1 Drop Both Eyes TID    lactobac ac& pc-s.therm-b.anim (MANI Q/RISAQUAD)  1 Cap Oral DAILY    ethambutol (MYAMBUTOL) tablet 800 mg  800 mg Oral DAILY    clarithromycin (BIAXIN) tablet 500 mg  500 mg Oral BID    0.9% sodium chloride infusion  50 mL/hr IntraVENous CONTINUOUS    albuterol-ipratropium (DUO-NEB) 2.5 MG-0.5 MG/3 ML  3 mL Nebulization BID RT          Lise Bruno, DONALD     [facility] [address] [place] [address],[address]   [facility]   [phone]

## 2017-12-13 NOTE — PROGRESS NOTES
TRANSFER - IN REPORT:    Verbal report received from Rashad on Zuri Lewis  being received from cath lab procedure area  for routine progression of care. Report consisted of patients Situation, Background, Assessment and Recommendations(SBAR). Information from the following report(s) Kardex and Procedure Summary was reviewed with the receiving clinician. Opportunity for questions and clarification was provided. Assessment completed upon patients arrival to [address] [place] [address] and care assumed. Cardiac Cath Lab Recovery Arrival Note:    Lonny Angel arrived to [facility] recovery area. Patient procedure= PPI. Patient on cardiac monitor, non-invasive blood pressure, SPO2 monitor. On  O2 @ 2 lpm via NC.  IV  of NS on pump at 25 ml/hr. Patient status doing well without problems. Patient is A&Ox 3. Patient reports no pain. PROCEDURE SITE CHECK:    Procedure site:without any bleeding and no hematoma, No pain/discomfort reported at procedure site. No change in patient status. Continue to monitor patient and status.

## 2017-12-13 NOTE — CDMP QUERY
To accurately capture the SOI & ROM please clarify if this patient is being treated/managed for:    =>Possible UTI (POA) in the setting of Abnormal U/A, requiring antibiotics and lab monitoring. =>Other Explanation of clinical findings  =>Unable to Determine (no explanation of clinical findings)    The medical record reflects the following clinical findings, treatment, and risk factors:    Risk Factors: Debility    Clinical Indicators:   U/A  Leukocyte esterase moderate  WBC 20-50  Bacteria (-)    Treatment: Antibiotics and lab monitoring    Please clarify and document your clinical opinion in the progress notes and discharge summary including the definitive and/or presumptive diagnosis, (suspected or probable), related to the above clinical findings. Please include clinical findings supporting your diagnosis.     Thank you  Armando Cedillo  [facility]  [phone]

## 2017-12-13 NOTE — PROGRESS NOTES
0700: Pt off unit for PM placement. 0730: Bedside shift change report given to Alisa Morrissey RN (oncoming nurse) by Torres Lopez RN (offgoing nurse). Report included the following information SBAR, Kardex, Procedure Summary, Intake/Output, MAR, Accordion, Recent Results, Med Rec Status and Cardiac Rhythm complete heart block. 1006: TRANSFER - IN REPORT:  Verbal report received from Shelli Apley, RN(name) on Geovany Priceada Refo  being received from Cath Lab(unit) for routine progression of care    Report consisted of patients Situation, Background, Assessment and   Recommendations(SBAR). Information from the following report(s) SBAR, Kardex, Procedure Summary, Intake/Output, MAR, Accordion, Recent Results, Med Rec Status and Cardiac Rhythm paced was reviewed with the receiving nurse. Opportunity for questions and clarification was provided. Assessment completed upon patients arrival to unit and care assumed. 1030: Patient arrived on unit. Tele-monitor placed and confirmed with monitor tech. 1930: Bedside shift change report given to Torres Lopez RN (oncoming nurse) by Alisa Morrissey RN (offgoing nurse). Report included the following information SBAR, Kardex, Procedure Summary, Intake/Output, MAR, Accordion, Recent Results, Med Rec Status and Cardiac Rhythm paced. Problem: Falls - Risk of  Goal: *Absence of Falls  Document Viral Fall Risk and appropriate interventions in the flowsheet.    Outcome: Progressing Towards Goal  Fall Risk Interventions:  Mobility Interventions: Bed/chair exit alarm, Communicate number of staff needed for ambulation/transfer, Patient to call before getting OOB    Mentation Interventions: Bed/chair exit alarm, Door open when patient unattended, Reorient patient, Toileting rounds    Medication Interventions: Bed/chair exit alarm, Evaluate medications/consider consulting pharmacy, Patient to call before getting OOB, Teach patient to arise slowly    Elimination Interventions: Call light in reach, Patient to call for help with toileting needs             Problem: Pacer/ICD: Post-Procedure  Goal: Diagnostic Test/Procedures  Outcome: Progressing Towards Goal  Labs collected; CXR ordered.

## 2017-12-13 NOTE — PROGRESS NOTES
Problem: Discharge Planning  Goal: *Discharge to safe environment  Outcome: Progressing Towards Goal  See cm notes.  ASHLEIGH Brar

## 2017-12-13 NOTE — PROGRESS NOTES
Transfer to [address] [place] [address] from Procedure Area    Verbal report given to Leroy Infante RN on Jonathan Nguyen Refo being transferred to Cardiac Cath Lab  for routine post - op   Patient is post Pacemaker procedure. Patient stable upon transfer to . Report consisted of patients Situation, Background, Assessment and   Recommendations(SBAR). Information from the following report(s) Procedure Summary, Intake/Output, MAR and Cardiac Rhythm Paced was reviewed with the receiving nurse. Opportunity for questions and clarification was provided. Patient medicated during procedure with orders obtained and verified by Dr. Jeremy Del Valle. Refer to patient PROCEDURE REPORT for vital signs, assessment, status, and response during procedure.

## 2017-12-13 NOTE — PROGRESS NOTES
Cardiac Cath Lab Procedure Area Arrival Note:    Williams Ellis arrived to Cardiac Cath Lab, Procedure Area. Patient identifiers verified with NAME and DATE OF BIRTH. Procedure verified with patient. Consent forms verified. Allergies verified. Patient informed of procedure and plan of care. Questions answered with review. Patient voiced understanding of procedure and plan of care. Patient on cardiac monitor, non-invasive blood pressure, SPO2 monitor. On room air. Placed on  O2 @ 2 lpm via nasal cannula. IV of NS on pump at 25 ml/hr. Patient status doing well without problems. Patient is A&Ox 4. Patient reports no pain. Patient medicated during procedure with orders obtained and verified by Dr. Bill Quintana. Refer to patients Cardiac Cath Lab PROCEDURE REPORT for vital signs, assessment, status, and response during procedure, printed at end of case. Printed report on chart or scanned into chart.

## 2017-12-13 NOTE — PROGRESS NOTES
12/12/17 1904   Vital Signs   Temp 97.7 °F (36.5 °C)   Temp Source Oral   Pulse (Heart Rate) (!) 37   Heart Rate Source Monitor   Cardiac Rhythm Complete Heart Block   Resp Rate 16   O2 Sat (%) 96 %   Level of Consciousness Alert   /49   MAP (Monitor) 76   MAP (Calculated) 77   BP 1 Method Automatic   BP 1 Location Right arm   BP Patient Position At rest   MEWS Score 3   Alarms Set and Audible Cardiac alarms   Box Number 18   Electrodes Replaced No   MD aware of HR/rhythm. Plan for PM tomorrow.

## 2017-12-13 NOTE — PROCEDURES
Cardiac Procedure Note   Patient: Mikayla Lazaro  MRN: [id number]  SSN: [id number]   YOB: 1924 Age: 80 y.o.   Sex: female    Date of Procedure: 12/13/2017   Pre-procedure Diagnosis: third degree av block  Post-procedure Diagnosis: same  Procedure: Permanent Pacemaker Insertion  :  Dr. Bam Villalobos MD    Assistant(s):  None  Anesthesia: Moderate Sedation   Estimated Blood Loss: Less than 10 mL   Specimens Removed: None  Findings: right RV apical and RAA leads  Complications: None   Implants: dual chamber Biotronik MRI pacer  Signed by:  Bam Villalobos MD  12/13/2017  10:12 AM

## 2017-12-13 NOTE — PROGRESS NOTES
1930: Bedside and Verbal shift change report given to Tahir Torrez RN (oncoming nurse) by Paula Blackman RN (offgoing nurse). Report included the following information SBAR, Kardex, ED Summary, Intake/Output, Recent Results and Cardiac Rhythm Complete heart block. 12: TRANSFER - OUT REPORT:    Verbal report given to Miguel A Beard RN(name) on Estrada GONSALEZ Refo  being transferred to Cath lab(unit) for ordered procedure       Report consisted of patients Situation, Background, Assessment and   Recommendations(SBAR). Information from the following report(s) SBAR, Kardex, ED Summary, Intake/Output, Recent Results and Cardiac Rhythm Complete heart block was reviewed with the receiving nurse. Lines:   Peripheral IV 12/12/17 Left Antecubital (Active)   Site Assessment Clean, dry, & intact 12/12/2017  8:27 PM   Phlebitis Assessment 0 12/12/2017  8:27 PM   Infiltration Assessment 0 12/12/2017  8:27 PM   Dressing Status Clean, dry, & intact 12/12/2017  8:27 PM   Dressing Type Transparent;Tape 12/12/2017  8:27 PM   Hub Color/Line Status Pink; Infusing 12/12/2017  8:27 PM   Action Taken Open ports on tubing capped 12/12/2017  8:27 PM   Alcohol Cap Used Yes 12/12/2017  8:27 PM       Peripheral IV 12/13/17 Right Forearm (Active)   Site Assessment Clean, dry, & intact 12/13/2017  4:15 AM   Phlebitis Assessment 0 12/13/2017  4:15 AM   Infiltration Assessment 0 12/13/2017  4:15 AM   Dressing Status New;Clean, dry, & intact 12/13/2017  4:15 AM   Dressing Type Transparent 12/13/2017  4:15 AM   Hub Color/Line Status Blue;Flushed; Infusing 12/13/2017  4:15 AM   Action Taken Open ports on tubing capped 12/13/2017  4:15 AM   Alcohol Cap Used Yes 12/13/2017  4:15 AM        Opportunity for questions and clarification was provided. Patient transported with:   Monitor  Tech   \    0730: Bedside and Verbal shift change report given to ALESSANDRA Jacskon (oncoming nurse) by Daisy Cai (offgoing nurse).  Report included the following information SBAR, Kardex, ED Summary, Procedure Summary, Intake/Output, Recent Results and Cardiac Rhythm Complete heart block. Problem: Falls - Risk of  Goal: *Absence of Falls  Document Viral Fall Risk and appropriate interventions in the flowsheet. Outcome: Progressing Towards Goal  Fall Risk Interventions:  Mobility Interventions: Bed/chair exit alarm, Communicate number of staff needed for ambulation/transfer, Patient to call before getting OOB    Mentation Interventions: Bed/chair exit alarm, Door open when patient unattended, Reorient patient, Toileting rounds    Medication Interventions: Bed/chair exit alarm, Evaluate medications/consider consulting pharmacy, Patient to call before getting OOB, Teach patient to arise slowly    Elimination Interventions: Call light in reach, Patient to call for help with toileting needs       Problem: Patient Education: Go to Patient Education Activity  Goal: Patient/Family Education  Outcome: Progressing Towards Goal  Educated pt on turning in bed. RN helping pt turn at times using pillow to reposition and tilt hip.

## 2017-12-13 NOTE — PROGRESS NOTES
TRANSFER - OUT REPORT:    Verbal report given to Roxanna Rodriguez RN on Nina Young being transferred to [id number]   for routine progression of care       Report consisted of patients Situation, Background, Assessment and   Recommendations(SBAR). Information from the following report(s) Kardex and Procedure Summary was reviewed with the receiving nurse. Opportunity for questions and clarification was provided.

## 2017-12-13 NOTE — PROGRESS NOTES
Physical Therapy Screening:    [facility] screening referral was triggered for physical therapy based on results obtained during the nursing admission assessment. The patients chart was reviewed and the patient is appropriate for a skilled therapy evaluation if there is a decline in functional mobility from baseline. Please order a consult for physical therapy if you are in agreement and would like an evaluation to be completed. Thank you.     Sebastian Fontaine, PT

## 2017-12-13 NOTE — PROGRESS NOTES
Problem: Mobility Impaired (Adult and Pediatric)  Goal: *Acute Goals and Plan of Care (Insert Text)  Physical Therapy Goals  Initiated 12/13/2017  1. Patient will move from supine to sit and sit to supine  in bed with minimal assistance/contact guard assist within 7 day(s). 2.  Patient will transfer from bed to chair and chair to bed with minimal assistance/contact guard assist using the least restrictive device within 7 day(s). 3.  Patient will perform sit to stand with minimal assistance/contact guard assist within 7 day(s). 4.  Patient will ambulate with minimal assistance/contact guard assist for 50 feet with the least restrictive device within 7 day(s). physical Therapy EVALUATION  Patient: Duke Ureña (38 y.o. female)  Date: 12/13/2017  Primary Diagnosis: Complete heart block ([address]        Precautions:   Fall    ASSESSMENT :  Based on the objective data described below, the patient presents with decreased activity tolerance, baseline tremors, decreased safety awareness, impaired balance and high fall risk following admission for complete heart block and s/p pacemaker placement today. [facility] Ambulates with rollator and has baseline tremors and dementia. Pt is a poor historian and perseverates on not doing exercises in the afternoon. Pt received supine in bed with L arm in sling and ice pack applied. Pt requiring Min-Mod assist for bed mobility, transfers and limited gait training. Pt very hesitant and nervous often stating she can't do anything and needs someone to hold her up. Mod A to come to stand with HHA and blocking of feet to avoid them sliding out and pt displays a large posterior lean. With HHA and gait belt able to transfer 3-4 shuffling steps to [facility]. Total A for toileting ADLs d/t impaired balance and immobilization of L arm. Pt sitting prematurely without use of UEs to control descent.  Provided multiple instructions for safety awareness. Ambulated short distance group home to the door and back to bed. Narrow CORNELIUS, shuffling steps and trace foot clearance. Excessive lateral weight shift and well below her reported baseline level of ambulation. Pt will require additional assistance upon returning home. Would recommend SNF level of rehab in the healthcare section of [facility] to increase independence and create a safe transition back to [facility]. Patient will benefit from skilled intervention to address the above impairments. Patients rehabilitation potential is considered to be Good  Factors which may influence rehabilitation potential include:   []         None noted  [x]         Mental ability/status  []         Medical condition  []         Home/family situation and support systems  []         Safety awareness  []         Pain tolerance/management  []         Other:      PLAN :  Recommendations and Planned Interventions:  [x]           Bed Mobility Training             [x]    Neuromuscular Re-Education  [x]           Transfer Training                   []    Orthotic/Prosthetic Training  [x]           Gait Training                         []    Modalities  [x]           Therapeutic Exercises           []    Edema Management/Control  [x]           Therapeutic Activities            [x]    Patient and Family Training/Education  []           Other (comment):    Frequency/Duration: Patient will be followed by physical therapy  5 times a week to address goals.   Discharge Recommendations: Skilled Nursing Facility  Further Equipment Recommendations for Discharge: TBD at rehab     SUBJECTIVE:   Patient stated I don't do exercises after 3pm.    OBJECTIVE DATA SUMMARY:   HISTORY:    Past Medical History:   Diagnosis Date    Bron[facility]ct[address]     Gastrointestinal disorder     benign tumor in colon 1982    Hx of colonic polyps     Hypertension     Kidney stones     Mastoiditis     Mycobacterium avium infection ([facility]     Other ill-defined conditions(799.89)     microbacterium isidra- intracellular lung disease    PNA (pneumonia)     Vertigo      Past Surgical History:   Procedure Laterality Date    ABDOMEN SURGERY PROC UNLISTED      colon tumor removed 1982    HX UROLOGICAL      bladder sling 2007     Prior Level of Function/Home Situation: Lives in [facility]. Ambulates with rollator per chart. Personal factors and/or comorbidities impacting plan of care: dementia, HTN, Vertigo     Home Situation  Home Environment: [address] [place] [address] Road Name: Leanne Maldonado  # Steps to Enter: 0  One/Two Story Residence: One story  Living Alone: No  Support Systems: Assisted living, Family member(s)  Patient Expects to be Discharged to[de-identified] Rehabilitation facility  Current DME Used/Available at Home: Amaya Hernandez, rollherlinda    EXAMINATION/PRESENTATION/DECISION MAKING:   Critical Behavior:  Neurologic State: Alert, Confused  Orientation Level: Disoriented to place, Disoriented to situation, Disoriented to time, Oriented to person  Cognition: Impaired decision making, Impulsive, Memory loss     Hearing: Auditory  Auditory Impairment: Hard of hearing, bilateral    Range Of Motion:  AROM: Within functional limits                       Strength:    Strength: Generally decreased, functional                    Tone & Sensation:   Tone: Normal              Sensation: Intact               Coordination:  Coordination: Generally decreased, functional  Vision:      Functional Mobility:  Bed Mobility:     Supine to Sit: Minimum assistance; Additional time;Assist x1  Sit to Supine: Moderate assistance;Assist x1;Additional time  Scooting: Maximum assistance  Transfers:  Sit to Stand:  Moderate assistance;Assist x1;Additional time  Stand to Sit: Moderate assistance                       Balance:   Sitting: Intact  Standing: Impaired  Standing - Static: Fair  Standing - Dynamic : Fair  Ambulation/Gait Training:  Distance (ft): 10 Feet (ft)  Assistive Device: Gait belt;Other (comment) (HHA)  Ambulation - Level of Assistance: Minimal assistance;Assist x1;Additional time        Gait Abnormalities: Decreased step clearance;Shuffling gait;Trunk sway increased        Base of Support: Narrowed     Speed/Yady: Delayed;Pace decreased (<100 feet/min); Shuffled  Step Length: Left shortened;Right shortened                Functional Measure:  Tinetti test:    Sitting Balance: 1  Arises: 0  Attempts to Rise: 0  Immediate Standing Balance: 1  Standing Balance: 0  Nudged: 0  Eyes Closed: 0  Turn 360 Degrees - Continuous/Discontinuous: 0  Turn 360 Degrees - Steady/Unsteady: 0  Sitting Down: 0  Balance Score: 2  Indication of Gait: 0  R Step Length/Height: 0  L Step Length/Height: 0  R Foot Clearance: 0  L Foot Clearance: 0  Step Symmetry: 0  Step Continuity: 1  Path: 0  Trunk: 0  Walking Time: 0  Gait Score: 1  Total Score: 3       Tinetti Test and G-code impairment scale:  Percentage of Impairment CH    0%   CI    1-19% CJ    20-39% CK    40-59% CL    60-79% CM    80-99% CN     100%   Tinetti  Score 0-28 28 23-27 17-22 12-16 6-11 1-5 0       Tinetti Tool Score Risk of Falls  <19 = High Fall Risk  19-24 = Moderate Fall Risk  25-28 = Low Fall Risk  Tinetti ME. Performance-Oriented Assessment of Mobility Problems in Elderly Patients. [address]; [id number]. (Scoring Description: PT Bulletin Feb. 10, 1993)    Older adults: Collette Canada et al, 2009; n = 1000 [facility] elderly evaluated with ABC, YULIYA, ADL, and IADL)  · Mean YULIYA score for males aged 69-68 years = 26.21(3.40)  · Mean YULIYA score for females age 69-68 years = 25.16(4.30)  · Mean YULIYA score for males over 80 years = 23.29(6.02)  · Mean YULIYA score for females over 80 years = 17.20(8.32)         G codes: In compliance with CMSs Claims Based Outcome Reporting, the following G-code set was chosen for this patient based on their primary functional limitation being treated:     The outcome measure chosen to determine the severity of the functional limitation was the Tinetti with a score of 3/28 which was correlated with the impairment scale. ? Mobility - Walking and Moving Around:     - CURRENT STATUS: CM - 80%-99% impaired, limited or restricted    - GOAL STATUS: CL - 60%-79% impaired, limited or restricted    - D/C STATUS:  ---------------To be determined---------------      Physical Therapy Evaluation Charge Determination   History Examination Presentation Decision-Making   MEDIUM  Complexity : 1-2 comorbidities / personal factors will impact the outcome/ POC  MEDIUM Complexity : 3 Standardized tests and measures addressing body structure, function, activity limitation and / or participation in recreation  LOW Complexity : Stable, uncomplicated  LOW Complexity : FOTO score of       Based on the above components, the patient evaluation is determined to be of the following complexity level: LOW     Pain:  Pain Scale 1: Numeric (0 - 10)  Pain Intensity 1: 0              Activity Tolerance:   Fair  Please refer to the flowsheet for vital signs taken during this treatment. After treatment:   []         Patient left in no apparent distress sitting up in chair  [x]         Patient left in no apparent distress in bed  [x]         Call bell left within reach  [x]         Nursing notified  []         Caregiver present  [x]         Bed alarm activated    COMMUNICATION/EDUCATION:   The patients plan of care was discussed with: Registered Nurse. [x]         Fall prevention education was provided and the patient/caregiver indicated understanding. [x]         Patient/family have participated as able in goal setting and plan of care. [x]         Patient/family agree to work toward stated goals and plan of care. []         Patient understands intent and goals of therapy, but is neutral about his/her participation. []         Patient is unable to participate in goal setting and plan of care.     Thank you for this referral.  Real Rizzo, PT   Time Calculation: 25 mins

## 2017-12-14 VITALS
WEIGHT: 121.69 LBS | TEMPERATURE: 97.4 F | SYSTOLIC BLOOD PRESSURE: 112 MMHG | RESPIRATION RATE: 18 BRPM | BODY MASS INDEX: 20.25 KG/M2 | DIASTOLIC BLOOD PRESSURE: 65 MMHG | HEART RATE: 73 BPM | OXYGEN SATURATION: 95 %

## 2017-12-14 LAB
ANION GAP SERPL CALC-SCNC: 7 MMOL/L (ref 5–15)
BACTERIA SPEC CULT: NORMAL
BUN SERPL-MCNC: 24 MG/DL (ref 6–20)
BUN/CREAT SERPL: 20 (ref 12–20)
CALCIUM SERPL-MCNC: 8.4 MG/DL (ref 8.5–10.1)
CC UR VC: NORMAL
CHLORIDE SERPL-SCNC: 107 MMOL/L (ref 97–108)
CO2 SERPL-SCNC: 25 MMOL/L (ref 21–32)
CREAT SERPL-MCNC: 1.2 MG/DL (ref 0.55–1.02)
ERYTHROCYTE [DISTWIDTH] IN BLOOD BY AUTOMATED COUNT: 13.1 % (ref 11.5–14.5)
GLUCOSE SERPL-MCNC: 96 MG/DL (ref 65–100)
HCT VFR BLD AUTO: 35.9 % (ref 35–47)
HGB BLD-MCNC: 11.8 G/DL (ref 11.5–16)
MCH RBC QN AUTO: 29.9 PG (ref 26–34)
MCHC RBC AUTO-ENTMCNC: 32.9 G/DL (ref 30–36.5)
MCV RBC AUTO: 90.9 FL (ref 80–99)
PLATELET # BLD AUTO: 259 K/UL (ref 150–400)
POTASSIUM SERPL-SCNC: 4 MMOL/L (ref 3.5–5.1)
RBC # BLD AUTO: 3.95 M/UL (ref 3.8–5.2)
SERVICE CMNT-IMP: NORMAL
SODIUM SERPL-SCNC: 139 MMOL/L (ref 136–145)
WBC # BLD AUTO: 9.2 K/UL (ref 3.6–11)

## 2017-12-14 PROCEDURE — 36415 COLL VENOUS BLD VENIPUNCTURE: CPT | Performed by: NURSE PRACTITIONER

## 2017-12-14 PROCEDURE — 94640 AIRWAY INHALATION TREATMENT: CPT

## 2017-12-14 PROCEDURE — 97116 GAIT TRAINING THERAPY: CPT

## 2017-12-14 PROCEDURE — 80048 BASIC METABOLIC PNL TOTAL CA: CPT | Performed by: NURSE PRACTITIONER

## 2017-12-14 PROCEDURE — 85027 COMPLETE CBC AUTOMATED: CPT | Performed by: NURSE PRACTITIONER

## 2017-12-14 PROCEDURE — 74011250637 HC RX REV CODE- 250/637: Performed by: NURSE PRACTITIONER

## 2017-12-14 PROCEDURE — 74011000250 HC RX REV CODE- 250: Performed by: SPECIALIST

## 2017-12-14 PROCEDURE — 74011000250 HC RX REV CODE- 250: Performed by: NURSE PRACTITIONER

## 2017-12-14 RX ORDER — CEPHALEXIN 250 MG/1
250 CAPSULE ORAL 3 TIMES DAILY
Qty: 14 CAP | Refills: 0 | Status: SHIPPED | OUTPATIENT
Start: 2017-12-14 | End: 2017-12-19

## 2017-12-14 RX ORDER — CARVEDILOL 6.25 MG/1
6.25 TABLET ORAL 2 TIMES DAILY WITH MEALS
Qty: 60 TAB | Refills: 2 | Status: SHIPPED | OUTPATIENT
Start: 2017-12-14 | End: 2017-12-22 | Stop reason: DRUGHIGH

## 2017-12-14 RX ADMIN — CARVEDILOL 6.25 MG: 6.25 TABLET, FILM COATED ORAL at 08:54

## 2017-12-14 RX ADMIN — ETHAMBUTOL HYDROCHLORIDE 800 MG: 400 TABLET, FILM COATED ORAL at 09:48

## 2017-12-14 RX ADMIN — POLYVINYL ALCOHOL 1 DROP: 14 SOLUTION/ DROPS OPHTHALMIC at 09:01

## 2017-12-14 RX ADMIN — CEPHALEXIN 250 MG: 250 CAPSULE ORAL at 08:54

## 2017-12-14 RX ADMIN — IPRATROPIUM BROMIDE AND ALBUTEROL SULFATE 3 ML: .5; 3 SOLUTION RESPIRATORY (INHALATION) at 08:05

## 2017-12-14 RX ADMIN — ACETAMINOPHEN 650 MG: 325 TABLET ORAL at 06:05

## 2017-12-14 RX ADMIN — CLARITHROMYCIN 500 MG: 500 TABLET ORAL at 08:53

## 2017-12-14 RX ADMIN — Medication 10 ML: at 06:06

## 2017-12-14 RX ADMIN — BUDESONIDE 500 MCG: 0.5 INHALANT RESPIRATORY (INHALATION) at 08:05

## 2017-12-14 RX ADMIN — PANTOPRAZOLE SODIUM 40 MG: 40 TABLET, DELAYED RELEASE ORAL at 07:24

## 2017-12-14 RX ADMIN — Medication 1 CAPSULE: at 08:53

## 2017-12-14 RX ADMIN — HYDROCHLOROTHIAZIDE 12.5 MG: 25 TABLET ORAL at 08:55

## 2017-12-14 NOTE — PROGRESS NOTES
Wound Check       No drainage   Physical Exam   Constitutional: well-developed and well-nourished. Skin: Left side pocket is covered with aquacel dressing      ASSESSMENT and PLAN     ICD-10-CM ICD-9-CM    1.  Complete heart block (HCC) I44.2 426.0      S/p pacemaker  Device check shows proper lead and generator functions  Follow-up Disposition:  Future Appointments  Date Time Provider Juan Miguel Garcia   12/22/2017 9:45 AM [email or web address], [id number] [facility]   12/22/2017 10:00 AM Joslyn Arias  [address]

## 2017-12-14 NOTE — DISCHARGE SUMMARY
Cardiology Discharge Summary     Patient ID:  Kostas Redmond  [id number]  50 y.o.  4/24/1924    Admit Date: 12/12/2017    Discharge Date: 12/14/2017    Admitting Physician: Roxanne Jonse MD     Discharge Physician: Roxanne Jones MD    Admission Diagnoses:   Complete heart block [facility])    Discharge Diagnoses: Active Problems:    Complete heart block ([address] (12/12/2017)      S/P cardiac pacemaker procedure (12/13/2017)      Overview: Dr Hemal Shannon       12/13/17        Discharge Condition: Good    Cardiology Procedures this Admission:  12/13/17:  Permanent pacemaker insertion  The pacemaker is a [employer]ron[employer] Edora 8 DR-T, model # [id number], serial # [id number]    Consults: Electrophysiology    Hospital Course: Kostas Redmond is a 80 y.o. female admitted on 12/12/2017  for Complete heart block ([address] Has PMH of HTN, kidney stone,GERD,  mastoiditis, vertigo, bronchiectasis, MAC and pneumonia, dementia who presented from  [facility] memory care assisted living via EMS with chief complaint of bradycardia. As per EMS, the pt  Vomited this a.m. And was evaluated by physician, was found to be bradycardic- no dizziness, chest pain or syncope. No hx of syncope. EMS found pt to be in complete heart block with HR in 40-50s. - 30's upon arrival to ER. As per the daughter, the pt had an episode of vomiting this morning. Cardiology was consulted and PPM was implanted on 12/13/17. Pt's medications were adjusted for hypertension. She received PT consult who recommended higher level of care at [facility] given new LUE restrictions. Pt exhibited leukocytosis on presentation wBC normalized by day of discharged and urine culture was negative. Her creatinine was mildly elevated at 1.2 and she received hydration on day of admission- creatinine to be followed at [facility]). Pt's DNR was reinstated after PPM procedure per daughter's request. Pt will be discharged to [facility] skilled unit.   Summary of hospital problems listed below:    1. Complete heart block:s/p PPM. Atrial sensed, ventricular paced currently. -TTE: EF 65%, NWMA, Trivial MR,   -Post procedural antibx per EP - Keflex x 5 days.     2. Hx of HTN:  BP improved on current regimen:  -losartan HCTZ 100-12.5 daily (home med) -   -Coreg 6.25 mg BID (no amlodipine due to interaction with clarithromycin)      3. Leukocytosis: Resolved  -Urine culture negative. Pt does not have UTI.      4. Elevated creatinine: Creatinine 2 from 1.13 yesterday and 1.27 on admission. -BMP to be followed at [facility].     5. Hx of MAC  -Continue home clarithromycin and ethambutol      6. Hx of bronchiectasis: on home nebs.      7. Hx of dementia.     8. Advanced directives. Pt has DDNR. Cardiology Attending:Patient seen and examined. I agree with NP assessment and plans. BP better urine culture no growth. Ivette Addison MD 12/14/2017 1:54 PM           Visit Vitals    /65 (BP 1 Location: Right arm, BP Patient Position: Sitting)    Pulse 73    Temp 97.4 °F (36.3 °C)    Resp 18    Wt 55.2 kg (121 lb 11.1 oz)    SpO2 95%    Breastfeeding No    BMI 20.25 kg/m2       Physical Exam  General: Alert, oriented x 1. Pleasant. NAD. Abdomen: soft, non-tender. Bowel sounds present.  Extremities: no LE edema, + PP bilaterally   Heart: regular rate and rhythm, S1, S2 normal, no murmurs, clicks, rubs or gallops  Lungs: clear to auscultation bilaterally  Neck: supple,, no JVD,   Neurologic: Grossly normal  Pulses: 2+ and symmetrical    Labs:   Recent Labs      12/14/17   0442  12/12/17   1410   WBC  9.2  13.1*   HGB  11.8  12.9   HCT  35.9  38.8   PLT  259  300     Recent Labs      12/14/17   0442  12/13/17   0447 12/12/17   1410   NA  139  141  138   K  4.0  4.2  4.4   CL  107  108  105   CO2  25  24  25   GLU  96  96  114*   BUN  24*  30*  39*   CREA  1.20*  1.13*  1.27*   CA  8.4*  9.1  9.2   MG   --    --   2.4   ALB   --    --   3.6   SGOT   --    --   16   ALT   --    --   24 Recent Labs      17   1410   TROIQ  <0.04   CPK  137   CKMB  4.4*       EK17: Normal sinus rhythm with complete heart block with ventricular escape   complexes   When compared with ECG of 2016 12:33,   Complete heart block is new   Confirmed by Leyla Pruitt M.D., Rocío Smith ([id number]) on 2017 1:47:55 PM   CXR: Admit:  IMPRESSION: Mild bibasilar patchy air space disease.      Other Diagnostic Tests:   TTE: 17:Left ventricle: Systolic function was normal. Ejection fraction was  estimated to be 65 %. There were no regional wall motion abnormalities. Device check:     Disposition: [facility]     Patient Instructions:   Current Discharge Medication List      START taking these medications    Details   carvedilol (COREG) 6.25 mg tablet Take 1 Tab by mouth two (2) times daily (with meals). Qty: 60 Tab, Refills: 2      cephALEXin (KEFLEX) 250 mg capsule Take 1 Cap by mouth three (3) times daily for 14 doses. Qty: 14 Cap, Refills: 0         CONTINUE these medications which have NOT CHANGED    Details   ALPRAZolam (XANAX) 0.25 mg tablet Take 0.25 mg by mouth as needed for Anxiety (30-60 min prior to dental procedures). fluticasone-vilanterol (BREO ELLIPTA) 100-25 mcg/dose inhaler Take 1 Puff by inhalation daily. clarithromycin (BIAXIN) 500 mg tablet Take 500 mg by mouth two (2) times a day. !! albuterol-ipratropium (DUO-NEB) 2.5 mg-0.5 mg/3 ml nebu 3 mL by Nebulization route every six (6) hours as needed. !! albuterol-ipratropium (DUO-NEB) 2.5 mg-0.5 mg/3 ml nebu 3 mL by Nebulization route two (2) times a day.      ethambutol (MYAMBUTOL) 400 mg tablet Take 800 mg by mouth daily. Indications: pulmonary myobacterial infection      Saccharomyces boulardii (FLORASTOR) 250 mg capsule Take 250 mg by mouth two (2) times a day.       loperamide (IMODIUM) 2 mg capsule Take 2 mg by mouth as needed for Diarrhea (after every loose stool, maximum of 6 caps/24 hours). losartan-hydroCHLOROthiazide (HYZAAR) 100-12.5 mg per tablet Take 1 Tab by mouth daily. Indications: hypertension      meclizine (ANTIVERT) 12.5 mg tablet Take 12.5 mg by mouth nightly as needed. Indications: Vertigo      multivitamin (ONE A DAY) tablet Take 1 Tab by mouth daily. pantoprazole (PROTONIX) 40 mg tablet Take 40 mg by mouth every morning. guaiFENesin (ROBITUSSIN MUCUS-CHEST CONGEST) 100 mg/5 mL liquid Take 200 mg by mouth every four (4) hours as needed for Cough. peg 400-propylene glycol (SYSTANE, PROPYLENE GLYCOL,) 0.4-0.3 % drop 1 Drop three (3) times daily. b complex vitamins tablet Take 1 Tab by mouth daily. cholecalciferol, vitamin D3, (VITAMIN D3) 2,000 unit tab Take 1 Tab by mouth daily. acetaminophen (TYLENOL) 325 mg tablet Take 2 Tabs by mouth every four (4) hours as needed. Qty: 30 Tab, Refills: 0       !! - Potential duplicate medications found. Please discuss with provider. STOP taking these medications       metoprolol succinate (TOPROL-XL) 25 mg XL tablet Comments:   Reason for Stopping:               Reference discharge instructions provided by nursing for diet and activity. Follow-up with   Future Appointments  Date Time Provider Juan Miguel Garcia   12/22/2017 9:45 [email or web address] [address] [facility]   12/22/2017 10:00 AM DONALD W[address] Dr. 1/16/17 10:20aM    Signed:  Sonja Hill.  DONALD Bruno

## 2017-12-14 NOTE — PROGRESS NOTES
Problem: Mobility Impaired (Adult and Pediatric)  Goal: *Acute Goals and Plan of Care (Insert Text)  Physical Therapy Goals  Initiated 12/13/2017  1. Patient will move from supine to sit and sit to supine  in bed with minimal assistance/contact guard assist within 7 day(s). 2.  Patient will transfer from bed to chair and chair to bed with minimal assistance/contact guard assist using the least restrictive device within 7 day(s). 3.  Patient will perform sit to stand with minimal assistance/contact guard assist within 7 day(s). 4.  Patient will ambulate with minimal assistance/contact guard assist for 50 feet with the least restrictive device within 7 day(s). physical Therapy TREATMENT  Patient: Kostas Redmond (89 y.o. female)  Date: 12/14/2017  Diagnosis: Complete heart block (HCC) <principal problem not specified>       Precautions: Fall    ASSESSMENT:  Patient received in bedside chair with her daughter present for education and to ask questions. The patient has baseline memory impairment and uses a rollator. She is unable to utilize her LUE post pacemaker implantation and reviewed precautions with her and her family in the mean time. The patient demonstrated fair to poor dynamic balance when utilizing HHA for balance and required min-moderate assist to prevent a fall. She would be unsafe to use a cane d/t an inconsistent LOB L & R and she is unable to remember her post-op precautions. Recommend return to healthcare at [facility] to progress mobility and address balance while she recovers post pacemaker. Progression toward goals:  [x]    Improving appropriately and progressing toward goals  []    Improving slowly and progressing toward goals  []    Not making progress toward goals and plan of care will be adjusted     PLAN:  Patient continues to benefit from skilled intervention to address the above impairments. Continue treatment per established plan of care.   Discharge Recommendations: Skilled Nursing Facility  Further Equipment Recommendations for Discharge:  to be determined       SUBJECTIVE:   Patient stated Serafin Mack don't have sense enough to have concerns.  (patient started to ask a question and stopped short. Unable to recall question when prompted)  Reviewed no pushing, pulling, lifting >5 lbs, and shoulder flexion <90 degrees  OBJECTIVE DATA SUMMARY:   Critical Behavior:  Neurologic State: Alert, Confused  Orientation Level: Oriented to person, Disoriented to place, Disoriented to situation, Disoriented to time (pt knows month, unsure of year)  Cognition: Appropriate for age attention/concentration, Follows commands, Impaired decision making, Memory loss, Poor safety awareness     Functional Mobility Training:  Bed Mobility:           Scooting: Moderate assistance        Transfers:  Sit to Stand: Moderate assistance; Additional time  Stand to Sit: Moderate assistance; Additional time                             Balance:  Sitting: Intact  Standing: Impaired  Standing - Static: Fair  Standing - Dynamic : Poor  Ambulation/Gait Training:  Distance (ft): 60 Feet (ft)  Assistive Device: Gait belt (hand hold assist)  Ambulation - Level of Assistance: Minimal assistance; Moderate assistance        Gait Abnormalities: Decreased step clearance        Base of Support: Narrowed     Speed/Yady: Pace decreased (<100 feet/min)  Step Length: Left shortened;Right shortened   Cued to keep her RUE by her side and utilize HHA for balance.  2-3 moderate assist LOBs and varied L vs R    Pain:  Pain Scale 1: Numeric (0 - 10)  Pain Intensity 1: 0  Pain Location 1: Chest  Pain Orientation 1: Left  Pain Description 1: Sore  Pain Intervention(s) 1: Medication (see MAR)    After treatment:   [x]    Patient left in no apparent distress sitting up in chair  []    Patient left in no apparent distress in bed  [x]    Call bell left within reach  [x]    Nursing notified  []    Caregiver present  []    Bed alarm activated    COMMUNICATION/COLLABORATION:   The patients plan of care was discussed with: Registered Nurse    Marisela Kilgore, PT, DPT   Time Calculation: 12 mins

## 2017-12-14 NOTE — PROCEDURES
PERMANENT PACEMAKER INSERTION     DATE OF PROCEDURE: 12/13/2017    PROCEDURE:  Implantation of dual-chamber pacemaker with contrast venography and fluoroscopy. HISTORY:  This is a 80 y.o. woman who has symptomatic severe   bradycardia because of irreversible third degree av block. She meets the indication for dual chamber pacer insertion. The risks and benefits were discussed with the patient and her daughter and she agreed to proceed. PROCEDURE IN DETAIL:  After the informed written consent had been obtained, the patient was brought to the Electrophysiology Suite where the patient was prepped and draped in the usual sterile fashion. Conscious sedation was initiated and maintained with intravenous Versed and fentanyl. Local anesthesia with 2% Xylocaine was given in the left infraclavicular area. 10 mL of contrast injected inside the left antecubital vein and the left axillary vein was patent. The #10 blade scalpel was then used to make a 3 cm incision below the left clavicle. Using the modified Seldinger technique 1 guidewire was advanced into the left axillary vein. A subcutaneous device pocket was made in the inferomedial direction by blunt dissection. Over the guidewire, a 8-Cypriot peel-away introducer dilator was then advanced inside the vein. An active fixation pacing and sensing lead was positioned in the right ventricular apex. The lead was anchored down with #2 Ethibond sutures after the pacing and sensing were optimal.  Over the retained guidewire another 6-Cypriot peel-away introducer dilator was then advanced inside the vein. The active fixation pacing and sensing lead was then advanced and positioned in the right atrial appendage. There was no diaphragmatic stimulation with 10 volts maximal output for either lead. The pocket is now irrigated with antibiotic solution and the lead was connected to the device and inserted inside the pocket.  Vancomycin powder is placed inside the pocket. The pocket is now closed in three consecutive layers using 2-0 Vicryl sutures in continuous fashion. Steri strips are now applied over the surgical wound. COMPLICATIONS:  None. ESTIMATED BLOOD LOSS: 10 mL. IMPLANTED HARDWARE:  The pacemaker is a DiasporaroniPeer5 Edora 8 DR-T, model # [id number], serial # [id number]    ATRIAL LEAD:  Biotronik Solia S45 model # [id number] , serial # [id number]    VENTRICULAR LEAD:  Biotronik Solia S53  model # [id number] and serial # [id number]    Specimen removed: NONE    DATA:  The P wave is 3.1 mV, pacing impedance 661 ohms and pacing threshold acutely is 0.8 volts at 0.4 ms. The ventricular lead had the R wave sensing 5.2 mV and the pacing impedance 654 ohms and pacing threshold 0.4 volts at 0.4 ms. PROGRAMMED PARAMETER:  The device is programmed to DDD pacing mode with the low rate of 60 beats per minute and tracking rate of 120 beats per minute.         ASSESSMENT AND PLAN:  The patient will follow up with me in 1- 2 weeks

## 2017-12-14 NOTE — PROGRESS NOTES
Per PT patient may benefit from SNF - [facility] can admit to their healthcare unit today (SNF level) if patient is medically ready for discharge. Will await orders this am - also facility will need to pick-up patient by 3 pm for [facility] to be able to trasnport by w/c Hiren George. ASHLEIGH Hercules,ORESTES placed a call to Helen Munguia at [facility] to notify her of plan for discharge to [address] [place] [address] and to request assistance for w/c Hiren George transport for patient and daughter plans to ride with patient.  ASHLEIGH Hercules

## 2017-12-14 NOTE — PROGRESS NOTES
1930: Bedside and Verbal shift change report given to ALESSANDRA Suarez (oncoming nurse) by Lisa Davies RN (offgoing nurse). Report included the following information SBAR, Kardex, ED Summary, Procedure Summary, Intake/Output, Recent Results and Cardiac Rhythm Paced. 0030:  Pt refusing to let PCT or RN obtain temperature. Pt agitated when woken up and states \"I want to sleep leave me alone\". BP was 143/101. BP re-check while pt sleeping was 128/68. Oxygen saturation 94% on room air, HOB elevated, guided imagery channel turned on. Will continue to monitor. 0500:  Pt now calm and cooperative. 0730: Bedside and Verbal shift change report given to Christy Zuleta RN (oncoming nurse) by Whitney Harding (offgoing nurse). Report included the following information SBAR, Kardex, ED Summary, Intake/Output, Recent Results and Cardiac Rhythm Paced. Problem: Falls - Risk of  Goal: *Absence of Falls  Document Viral Fall Risk and appropriate interventions in the flowsheet. Outcome: Progressing Towards Goal  Fall Risk Interventions:  Mobility Interventions: Communicate number of staff needed for ambulation/transfer, Bed/chair exit alarm, Patient to call before getting OOB, Utilize gait belt for transfers/ambulation    Mentation Interventions: Bed/chair exit alarm, Door open when patient unattended, Gait belt with transfers/ambulation, Reorient patient    Medication Interventions: Patient to call before getting OOB, Teach patient to arise slowly, Bed/chair exit alarm    Elimination Interventions: Call light in reach, Bed/chair exit alarm, Patient to call for help with toileting needs    History of Falls Interventions: Bed/chair exit alarm, Door open when patient unattended, Evaluate medications/consider consulting pharmacy        Problem: Patient Education: Go to Patient Education Activity  Goal: Patient/Family Education  Outcome: Progressing Towards Goal  Educated pt on importance of turning self in the bed.   RN using pillows to assist at times.     Problem: Pacer/ICD: Post-Procedure  Goal: Activity/Safety  Outcome: Progressing Towards Goal  Educated pt on not using LUE, applying ice to the site, and reporting any pain/bleeding. Goal: *Optimal pain control at patient's stated goal  Outcome: Progressing Towards Goal  Pt has no complaints of pain at this time.

## 2017-12-14 NOTE — PROGRESS NOTES
1415hrs:  Patient discharged from [facility] with all personal belongings and discharge paperwork. Family at bedside to receive discharge instructions both verbal and written. TRANSFER - OUT REPORT:  Verbal report given on [facility] As[facility]  [facility] care   Report consisted of patients Situation, Background, Assessment and Recommendations(SBAR). Information from the following report(s) SBAR, Kardex, Intake/Output, MAR, Recent Results, Med Rec Status and Cardiac Rhythm V paced was reviewed with the receiving nurse. Opportunity for questions and clarification was provided. Patient transported with:  Glasses, discharge paperwork.

## 2017-12-18 ENCOUNTER — PATIENT OUTREACH (OUTPATIENT)
Dept: CARDIOLOGY CLINIC | Age: 82
End: 2017-12-18

## 2017-12-18 NOTE — PROGRESS NOTES
Hilary Mckeon is a 80 y.o. female / NN call to [address]  N for nurse manager at healthcare - to check on progress/ restrictions and medications - vital signs - pt with follow up 12/22 - pacer clinic and NP - Call to and reached pt's daughter - Bettina Rosario - re: progress and status - they are using the sling as reminder - as pt has memory loss issues - PT to follow up - PPI precautions discussed. This patient was received as a referral from provider referral / for complete heart block and PPI  Pt was d/c to [facility] - healthcare unit - post PPI/ CHB. Inpatient RRAT Score: 10  Patient's challenges to self management identified:  Age 80/ debility/ memory issues - pt uses rollator -   Arm movement limitations - d/t PPI/     Medication Management:  good adherence, good understanding /administered by facility and/or care given - pt with memory issues     Summary of patients top  problems:     Problem 1:    1. Complete heart block: asymptomatic currently   -Stop home toprol XL  -Admit to telemetry. -TSH normal  -lytes ok- will add on magnesium  -TTE ordered  -Consult to Dr. Veronica Ayoub for PPM- discussed with pt and daughter- agreeable to plan. Problem 2: 2. Hx of HTN:  Currently normotensive  -losartan HCTZ 100-12.5 daily (home med) w/hold parameter of 356 or less systolic  -Hold Toprol XL     Problem 4. Elevated creatinine:  Creatinine 1.27  (from 0.76 1/2016)  -?perhaps due to decreased renal perfusion from bradycardia  -Will start gentle IV fluids (50 ml/hr)  -Recheck BMP in a.m.      Problem 5. Hx of MAC  -Resume home clarithromycin and ethambutol  Hx of bronchiectasis: on home nebs.      Problem 6.   Hx of dementia.  ____________________________________________________________________________________________  Advance Care Planning:   Patient was offered the opportunity to discuss advance care planning:  yes     Does patient have an Advance Directive:  yes   If no, did you provide information on Advance Care Planning? yes     Pacemaker note -   Patient: Hema Stafford  MRN: [id number]  SSN: [id number]   YOB: 1924 Age: 80 y.o. Sex: female    Date of Procedure: 12/13/2017   Pre-procedure Diagnosis: third degree av block  Post-procedure Diagnosis: same  Procedure: Permanent Pacemaker Insertion  :  Dr. Tammy Lazo MD   Findings: right RV apical and RAA leads  Implants: dual chamber Biotronik MRI pacer  Signed by:  Tammy Lazo MD  12/13/2017  10:12 AM  ___________________________________________________________________________________  IMPLANTED HARDWARE:  The pacemaker is a Biotronik Edora 8 DR-T, model # [id number], serial # [id number]   ATRIAL LEAD:  Biotronik Coffee Fu C81 model # [id number] , serial # [id number]   VENTRICULAR LEAD:  Biotronik Coffee Fu K02  model # [id number] and serial # [id number]  DATA:  The P wave is 3.1 mV, pacing impedance 661 ohms and pacing threshold acutely is 0.8 volts at 0.4 ms.    The ventricular lead had the R wave sensing 5.2 mV and the pacing impedance 654 ohms and pacing threshold 0.4 volts at 0.4 ms.     PROGRAMMED PARAMETER:  The device is programmed to DDD pacing mode with the low rate of 60 beats per minute and tracking rate of 120 beats per minute.     ASSESSMENT AND PLAN:  The patient will follow up with me in 1- 2 weeks  [employer], Referrals, and Durable Medical Equipment: Pt d/c to [facility] - healthcare unit -    for charge nurse re: meds/ any changes/ and how she is doing. Follow up appointments:  Dec 22, 9:45 - pacer clinic and 10 :00 wound check - NP   Goals     None      ___________________________________________________________________________________________________  Hema Stafford is a 80 y.o. female admitted on 12/12/2017  for Complete heart block ([address] Has PMH of HTN, kidney stone,GERD,  mastoiditis, vertigo, bronchiectasis, MAC and pneumonia who presents from  [facility] via EMS with chief complaint of bradycardia.  As per EMS, the pt was found to be bradycardic this morning without other sx. EMS found a complete heart block on the pt and she was bradycardic in the 40-50s. As per the daughter, the pt had an episode of vomiting this morning. The pt comes from the [facility] care area of [facility] [place]    Assessment/Plan:   Irreversible third degree AV block. -on small dose of Toprol (25mg), last dose toprol this AM  -TSH WNL  -Echo pending   -tentatively schedule for pacemaker tomorrow morning. NPO after midnight except medications. -WBC 13.1, Hermila Mcfarlane NP ordered urinalysis w/ reflex culture  -long discussion with daughter about arm restrictions, she may need a higher level of care post pacemaker   The concern is her remembering to keep her arm below the shoulder level and using her arms to get out of the chair. She is weak and need help to get up  -Discussed with the patient and daughter:  - Procedure details  - Post procedure wound care and arm restrictions  - 2 week follow up - wound and device check   - Prophylactic antibiotic for 5 days post procedure  - Pain medication and ice pack for pain relief      Hypertension: controlled, cozaar/HCTZ ordered     REJI,pre renal: creatinine 1.27.     Reynold Elise RN , Leandro Hurtado, [facility]   [facility]  [phone]

## 2017-12-22 ENCOUNTER — OFFICE VISIT (OUTPATIENT)
Dept: CARDIOLOGY CLINIC | Age: 82
End: 2017-12-22

## 2017-12-22 ENCOUNTER — CLINICAL SUPPORT (OUTPATIENT)
Dept: CARDIOLOGY CLINIC | Age: 82
End: 2017-12-22

## 2017-12-22 DIAGNOSIS — I44.2 COMPLETE HEART BLOCK (HCC): ICD-10-CM

## 2017-12-22 DIAGNOSIS — Z95.0 S/P CARDIAC PACEMAKER PROCEDURE: Primary | ICD-10-CM

## 2017-12-22 DIAGNOSIS — Z95.0 CARDIAC PACEMAKER IN SITU: Primary | ICD-10-CM

## 2017-12-22 RX ORDER — LOSARTAN POTASSIUM AND HYDROCHLOROTHIAZIDE 12.5; 5 MG/1; MG/1
1 TABLET ORAL DAILY
COMMUNITY
End: 2019-02-05

## 2017-12-22 RX ORDER — CARVEDILOL 3.12 MG/1
TABLET ORAL 2 TIMES DAILY WITH MEALS
COMMUNITY
End: 2019-02-05

## 2017-12-22 NOTE — PROGRESS NOTES
Cardiac Electrophysiology Wound Check Note      Wound Check s/p dual chamber pacemaker 12/14/17  Patient is here for wound check. No fever, drainage   Physical Exam   Constitutional: well-developed and well-nourished. Skin: Left side pocket is healing without redness, drainage, hematoma. The wound is intact with skin glue. ASSESSMENT and PLAN     ICD-10-CM ICD-9-CM    1. S/P cardiac pacemaker procedure Z95.0 V45.01    2. Complete heart block (HCC) I44.2 426.0        The incision is healing without redness, drainage, hematoma. Intact with skin glue. The patient has finished their anti-biotic and been compliant with arm restrictions. They will continue arms restrictions for 3 more weeks.   current treatment plan is effective, no change in therapy   Device check shows proper lead and generator functions 100%     Follow-up Disposition:  Return 3 months I will check via device clinic or remote monitoring in the future    Future Appointments  Date Time Provider Juan Miguel Garcia   1/16/2018 10:20 AM Sukhi Riley  [address]   4/19/2018 10:30 AM Jaxon King, [address] [facility]   4/19/2018 10:40 AM Zehra Reaves  [address]

## 2017-12-22 NOTE — MR AVS SNAPSHOT
Visit Information Date & Time Provider Department Dept. Phone Encounter #  
 12/22/2017 10:00 AM Mercy Covington NP [facility] [place] [phone] [id number] Your Appointments 1/16/2018 10:20 AM  
HOSPITAL DISCHARGE with Jerald Mendez MD  
[facility]) Appt Note: hospital follow up per [facility] [address] [place] [address]  
[address] [address]  
  
    
 4/19/2018 10:30 AM  
PACEMAKER with Marshall Villarreal [facility] ([facility]) Appt Note: bio ppi, rc, chronic ck,HM,  b 12/22/17, see [address] [place]  
[address]  
  
    
 4/19/2018 10:40 AM  
ESTABLISHED PATIENT with Tete Gordon MD  
[facility] ([facility] Appt Note: bio ppi, rc, chronic ck,HM,  b 12/22/17, see [address] [facility] [address] [place] [address]  
[address] [address] Upcoming Health Maintenance Date Due DTaP/Tdap/Td series (1 - Tdap) 4/24/1945 ZOSTER VACCINE AGE 60> 2/24/1984 GLAUCOMA SCREENING Q2Y 4/24/1989 OSTEOPOROSIS SCREENING (DEXA) 4/24/1989 Pneumococcal 65+ Low/Medium Risk (1 of 2 - PCV13) 4/24/1989 MEDICARE YEARLY EXAM 4/24/1989 Influenza Age 5 to Adult 8/1/2017 Allergies as of 12/22/2017  Review Complete On: 12/22/2017 By: Clary Kennedy RN No Known Allergies Current Immunizations  Reviewed on 1/27/2016 No immunizations on file. Not reviewed this visit You Were Diagnosed With   
  
 Codes Comments S/P cardiac pacemaker procedure    -  Primary ICD-10-CM: Z95.0 ICD-9-CM: V45.01 Complete heart block (HCC)     ICD-10-CM: I44.2 ICD-9-CM: 426.0 Vitals OB Status Smoking Status Postmenopausal Never Smoker Your Updated Medication List  
  
   
This list is accurate as of: 12/22/17 10:30 AM.  Always use your most recent med list.  
  
  
  
  
 acetaminophen 325 mg tablet Commonly known as:  TYLENOL Take 2 Tabs by mouth every four (4) hours as needed. * albuterol-ipratropium 2.5 mg-0.5 mg/3 ml Nebu Commonly known as:  DUO-NEB  
3 mL by Nebulization route every six (6) hours as needed. * albuterol-ipratropium 2.5 mg-0.5 mg/3 ml Nebu Commonly known as:  DUO-NEB  
3 mL by Nebulization route two (2) times a day. b complex vitamins tablet Take 1 Tab by mouth daily. BREO ELLIPTA 100-25 mcg/dose inhaler Generic drug:  fluticasone-vilanterol Take 1 Puff by inhalation daily. clarithromycin 500 mg tablet Commonly known as:  [place]  Take 500 mg by mouth two (2) times a day. COREG 3.125 mg tablet Generic drug:  carvedilol Take  by mouth two (2) times daily (with meals). ethambutol 400 mg tablet Commonly known as:  MYAMBUTOL Take 800 mg by mouth daily. Indications: pulmonary myobacterial infection FLORASTOR 250 mg capsule Generic drug:  Saccharomyces boulardii Take 250 mg by mouth two (2) times a day. loperamide 2 mg capsule Commonly known as:  IMODIUM Take 2 mg by mouth as needed for Diarrhea (after every loose stool, maximum of 6 caps/24 hours). losartan-hydroCHLOROthiazide 50-12.5 mg per tablet Commonly known as:  HYZAAR Take 1 Tab by mouth daily. meclizine 12.5 mg tablet Commonly known as:  ANTIVERT Take 12.5 mg by mouth nightly as needed. Indications: Vertigo  
  
 multivitamin tablet Commonly known as:  ONE A DAY Take 1 Tab by mouth daily. PROTONIX 40 mg tablet Generic drug:  pantoprazole Take 40 mg by mouth every morning. ROBITUSSIN MUCUS-CHEST CONGEST 100 mg/5 mL liquid Generic drug:  guaiFENesin Take 200 mg by mouth every four (4) hours as needed for Cough. SYSTANE (PROPYLENE GLYCOL) 0.4-0.3 % Drop Generic drug:  peg 400-propylene glycol 1 Drop three (3) times daily. VITAMIN D3 2,000 unit Tab Generic drug:  cholecalciferol (vitamin D3) Take 1 Tab by mouth daily. XANAX 0.25 mg tablet Generic drug:  ALPRAZolam  
Take 0.25 mg by mouth as needed for Anxiety (30-60 min prior to dental procedures). * Notice: This list has 2 medication(s) that are the same as other medications prescribed for you. Read the directions carefully, and ask your doctor or other care provider to review them with you. Introducing [facility]! [employer] introduces [employer] patient portal. Now you can access parts of your medical record, email your doctor's office, and request medication refills online. 1. In your internet browser, go to [email or web address] 2. Click on the First Time User? Click Here link in the Sign In box. You will see the New Member Sign Up page. 3. Enter your [employer] Access Code exactly as it appears below. You will not need to use this code after youve completed the sign-up process. If you do not sign up before the expiration date, you must request a new code. · [employer] Access Code: [email or web address] Expires: 3/14/2018 11:47 AM 
 
4. Enter the last four digits of your Social Security Number (xxxx) and Date of Birth (mm/dd/yyyy) as indicated and click Submit. You will be taken to the next sign-up page. 5. Create a [employer] ID. This will be your [employer] login ID and cannot be changed, so think of one that is secure and easy to remember. 6. Create a [employer] password. You can change your password at any time. 7. Enter your Password Reset Question and Answer. This can be used at a later time if you forget your password. 8. Enter your e-mail address. You will receive e-mail notification when new information is available in [address] 9. Click Sign Up. You can now view and download portions of your medical record. 10. Click the Download Summary menu link to download a portable copy of your medical information. If you have questions, please visit the Frequently Asked Questions section of the [employer] website. Remember, [employer] is NOT to be used for urgent needs. For medical emergencies, dial 911. Now available from your iPhone and Android! Please provide this summary of care documentation to your next provider. Your primary care clinician is listed as Ambrocio Leonard. If you have any questions after today's visit, please call [phone].

## 2018-04-19 ENCOUNTER — CLINICAL SUPPORT (OUTPATIENT)
Dept: CARDIOLOGY CLINIC | Age: 83
End: 2018-04-19

## 2018-04-19 ENCOUNTER — OFFICE VISIT (OUTPATIENT)
Dept: CARDIOLOGY CLINIC | Age: 83
End: 2018-04-19

## 2018-04-19 VITALS
WEIGHT: 125 LBS | SYSTOLIC BLOOD PRESSURE: 152 MMHG | HEIGHT: 65 IN | DIASTOLIC BLOOD PRESSURE: 70 MMHG | HEART RATE: 90 BPM | BODY MASS INDEX: 20.83 KG/M2

## 2018-04-19 DIAGNOSIS — I44.2 COMPLETE HEART BLOCK (HCC): ICD-10-CM

## 2018-04-19 DIAGNOSIS — I83.893 VARICOSE VEINS OF LEG WITH EDEMA, BILATERAL: ICD-10-CM

## 2018-04-19 DIAGNOSIS — Z95.0 CARDIAC PACEMAKER IN SITU: Primary | ICD-10-CM

## 2018-04-19 RX ORDER — OMEPRAZOLE 20 MG/1
20 CAPSULE, DELAYED RELEASE ORAL DAILY
COMMUNITY

## 2018-04-19 RX ORDER — AZITHROMYCIN 250 MG/1
250 TABLET, FILM COATED ORAL DAILY
COMMUNITY

## 2018-04-19 NOTE — PROGRESS NOTES
Chief Complaint   Patient presents with    Irregular Heart Beat    Follow-up    Pacemaker Check     Verified patient with two types of identifiers. Verified medications with the patient. Verified patient's pharmacy- request hard prescriptions for medication changes to take back to [facility]     Per Dr Garfield Panchal discontinued all medications not taken.

## 2018-04-19 NOTE — MR AVS SNAPSHOT
[address] [facility] [address] [place] 
[phone] Patient: Maria Horvath MRN: [id number] Visit Information Date & Time Provider Department Dept. Phone Encounter #  
 2018 10:40 AM Jessie Judd MD [facility] Robin Marquez [phone] [id number] Your Appointments 2019 10:30 AM  
PACEMAKER with [id number] [facility] ([facility]) Appt Note: bio ppi, rc, annual thresh/HM, see [address] [facility] [address] [place]  
[address] [facility]  
  
    
 2019 10:40 AM  
ESTABLISHED PATIENT with Jessie Judd MD  
[facility] ([facility]) Appt Note: bio niru rodriguez, annual thresh/HM, see [address] [facility] [address] [place]  
[address] [address]  
  
    
  
 2018  9:00 AM  
REMOTE OFFICE VISIT with Blane Rowland [facility] ([facility]) Appt Note: bio niru rodriguez, b 18  
 [address] [place] [id number]  
[address] [facility] [address]  
  
    
 10/31/2018  8:30 AM  
REMOTE OFFICE VISIT with Blane Rowland [facility] ([facility]) Appt Note: bio ppi, rc  
 [address] [facility] [address] [place]  
[phone]  
  
    
 2019  9:15 AM  
REMOTE OFFICE VISIT with Blane Rowland [facility] ([facility]) Appt Note: bio niru rodriguez  
 [address] [facility] [address] [place]  
[phone] Upcoming Health Maintenance Date Due DTaP/Tdap/Td series (1 - Tdap) 1945 ZOSTER VACCINE AGE 60> 1984 GLAUCOMA SCREENING Q2Y 1989 Bone Densitometry (Dexa) Screening 1989 Pneumococcal 65+ Low/Medium Risk (1 of 2 - PCV13) 4/24/1989 Influenza Age 5 to Adult 8/1/2017 MEDICARE YEARLY EXAM 3/14/2018 Allergies as of 4/19/2018  Review Complete On: 4/19/2018 By: Tor Smith NP No Known Allergies Current Immunizations  Reviewed on 1/27/2016 No immunizations on file. Not reviewed this visit You Were Diagnosed With   
  
 Codes Comments Complete heart block (HCC)    -  Primary ICD-10-CM: P95.3 ICD-9-CM: 426.0 Cardiac pacemaker in situ     ICD-10-CM: Z95.0 ICD-9-CM: V45.01 Vitals BP Pulse Height(growth percentile) Weight(growth percentile) BMI OB Status 152/70 (BP 1 Location: Left arm, BP Patient Position: Sitting) 90 5' 5\" (1.651 m) 125 lb (56.7 kg) 20.8 kg/m2 Postmenopausal  
 Smoking Status Never Smoker Vitals History BMI and BSA Data Body Mass Index Body Surface Area  
 20.8 kg/m 2 1.61 m 2 Your Updated Medication List  
  
   
This list is accurate as of 4/19/18 11:07 AM.  Always use your most recent med list.  
  
  
  
  
 acetaminophen 325 mg tablet Commonly known as:  TYLENOL Take 2 Tabs by mouth every four (4) hours as needed. albuterol-ipratropium 2.5 mg-0.5 mg/3 ml Nebu Commonly known as:  DUO-NEB  
3 mL by Nebulization route every six (6) hours as needed. azithromycin 250 mg tablet Commonly known as:  Raymon Tse Take 250 mg by mouth daily. BREO ELLIPTA 100-25 mcg/dose inhaler Generic drug:  fluticasone-vilanterol Take 1 Puff by inhalation daily. COREG 3.125 mg tablet Generic drug:  carvedilol Take  by mouth two (2) times daily (with meals). ethambutol 400 mg tablet Commonly known as:  MYAMBUTOL Take 800 mg by mouth daily. Indications: pulmonary myobacterial infection FLORASTOR 250 mg capsule Generic drug:  Saccharomyces boulardii Take 250 mg by mouth two (2) times a day. loperamide 2 mg capsule Commonly known as:  IMODIUM Take 2 mg by mouth as needed for Diarrhea (after every loose stool, maximum of 6 caps/24 hours). losartan-hydroCHLOROthiazide 50-12.5 mg per tablet Commonly known as:  HYZAAR Take 1 Tab by mouth daily. meclizine 12.5 mg tablet Commonly known as:  ANTIVERT Take 12.5 mg by mouth nightly as needed. Indications: Vertigo  
  
 multivitamin tablet Commonly known as:  ONE A DAY Take 1 Tab by mouth daily. omeprazole 20 mg capsule Commonly known as:  PRILOSEC Take 20 mg by mouth daily. ROBITUSSIN MUCUS-CHEST CONGEST 100 mg/5 mL liquid Generic drug:  guaiFENesin Take 200 mg by mouth every four (4) hours as needed for Cough. SYSTANE (PROPYLENE GLYCOL) 0.4-0.3 % Drop Generic drug:  peg 400-propylene glycol 1 Drop three (3) times daily. VITAMIN D3 2,000 unit Tab Generic drug:  cholecalciferol (vitamin D3) Take 1 Tab by mouth daily. Introducing \[facility]\"" & HEALTH SERVICES! Shaunna Austin introduces [employer] [facility] patient portal. Now you can access parts of your medical record, email your doctor's office, and request medication refills online. 1. In your internet browser, go to [email or web address] 2. Click on the First Time User? Click Here link in the Sign In box. You will see the New Member Sign Up page. 3. Enter your [facility] Access Code exactly as it appears below. You will not need to use this code after youve completed the sign-up process. If you do not sign up before the expiration date, you must request a new code. · [facility] Access Code: [email or web address] Expires: 7/18/2018 11:07 AM 
 
4. Enter the last four digits of your Social Security Number (xxxx) and Date of Birth (mm/dd/yyyy) as indicated and click Submit. You will be taken to the next sign-up page. 5. Create a [facility] ID. This will be your [facility] login ID and cannot be changed, so think of one that is secure and easy to remember. 6. Create a [employer] password. You can change your password at any time. 7. Enter your Password Reset Question and Answer. This can be used at a later time if you forget your password. 8. Enter your e-mail address. You will receive e-mail notification when new information is available in [address] 9. Click Sign Up. You can now view and download portions of your medical record. 10. Click the Download Summary menu link to download a portable copy of your medical information. If you have questions, please visit the Frequently Asked Questions section of the [employer] website. Remember, [employer] is NOT to be used for urgent needs. For medical emergencies, dial 911. Now available from your iPhone and Android! Please provide this summary of care documentation to your next provider. Your primary care clinician is listed as Be Coreas. If you have any questions after today's visit, please call [phone].

## 2018-04-19 NOTE — PROGRESS NOTES
Cardiac Electrophysiology OFFICE Note     Subjective:      Terese Boateng is a 80 y.o. patient who is seen for follow up after placement of dual chamber Biotronik pacemaker in December 2017, placed due to complete heart block. She was also seen by Dr. Corby Modi during that hospital admission. Her daughter is here with her today. She lives at [facility] in the independent living area, has nursing aide assistance for ADLs & medications. Poor memory. She uses a rolling walker for ambulation. Hypertension has been well controlled. She denies any lightheadedness/dizziness. Occasional cough, taking antibiotics. Pacemaker check today shows proper device function. Echo (12/13/2017): LVEF 65%, trivial mitral regurgitation, possible insignificant  Pericardial effusion. Patient Active Problem List   Diagnosis Code    Healthcare-associated pneumonia J18.9    Complete heart block (HCC) I44.2    S/P cardiac pacemaker procedure Z95.0     Current Outpatient Prescriptions   Medication Sig Dispense Refill    azithromycin (ZITHROMAX) 250 mg tablet Take 250 mg by mouth daily.  omeprazole (PRILOSEC) 20 mg capsule Take 20 mg by mouth daily.  carvedilol (COREG) 3.125 mg tablet Take  by mouth two (2) times daily (with meals).  losartan-hydroCHLOROthiazide (HYZAAR) 50-12.5 mg per tablet Take 1 Tab by mouth daily.  fluticasone-vilanterol (BREO ELLIPTA) 100-25 mcg/dose inhaler Take 1 Puff by inhalation daily.  albuterol-ipratropium (DUO-NEB) 2.5 mg-0.5 mg/3 ml nebu 3 mL by Nebulization route every six (6) hours as needed.  ethambutol (MYAMBUTOL) 400 mg tablet Take 800 mg by mouth daily. Indications: pulmonary myobacterial infection      Saccharomyces boulardii (FLORASTOR) 250 mg capsule Take 250 mg by mouth two (2) times a day.  loperamide (IMODIUM) 2 mg capsule Take 2 mg by mouth as needed for Diarrhea (after every loose stool, maximum of 6 caps/24 hours).       meclizine (ANTIVERT) 12.5 mg tablet Take 12.5 mg by mouth nightly as needed. Indications: Vertigo      multivitamin (ONE A DAY) tablet Take 1 Tab by mouth daily.  guaiFENesin (ROBITUSSIN MUCUS-CHEST CONGEST) 100 mg/5 mL liquid Take 200 mg by mouth every four (4) hours as needed for Cough.  peg 400-propylene glycol (SYSTANE, PROPYLENE GLYCOL,) 0.4-0.3 % drop 1 Drop three (3) times daily.  cholecalciferol, vitamin D3, (VITAMIN D3) 2,000 unit tab Take 1 Tab by mouth daily.  acetaminophen (TYLENOL) 325 mg tablet Take 2 Tabs by mouth every four (4) hours as needed. (Patient taking differently: Take 650 mg by mouth every four (4) hours as needed for Pain.) 30 Tab 0     No Known Allergies  Past Medical History:   Diagnosis Date    Bronchiectasis ([address]     Gastrointestinal disorder     benign tumor in colon 1982    Hx of colonic polyps     Hypertension     Kidney stones     Mastoiditis     Mycobacterium avium infection ([facility]     Other ill-defined conditions(799.89)     microbacterium isidra- intracellular lung disease    PNA (pneumonia)     Vertigo      Past Surgical History:   Procedure Laterality Date    ABDOMEN SURGERY PROC UNLISTED      colon tumor removed 1982    HX UROLOGICAL      bladder sling 2007    ID INS NEW/RPLCMT PRM PM W/TRANSV ELTRD ATRIAL&VENT  12/13/2017            Social History   Substance Use Topics    Smoking status: Never Smoker    Smokeless tobacco: Never Used    Alcohol use No        Review of Systems:   Constitutional: Negative for fever, chills, weight loss, malaise/fatigue. HEENT: Negative for nosebleeds, vision changes. Respiratory: + occasional cough, hemoptysis  Cardiovascular: Negative for chest pain, palpitations, orthopnea, claudication, + leg swelling, no syncope, and no PND. Gastrointestinal: Negative for nausea, vomiting, diarrhea, blood in stool and melena. Genitourinary: Negative for dysuria, and hematuria.    Musculoskeletal: Negative for myalgias, arthralgia. Skin: Negative for rash. Heme: Does not bleed or bruise easily. Neurological: Negative for speech change and focal weakness     Objective:     Visit Vitals    /70 (BP 1 Location: Left arm, BP Patient Position: Sitting)    Pulse 90    Ht 5' 5\" (1.651 m)    Wt 125 lb (56.7 kg)    BMI 20.8 kg/m2      Physical Exam:   Constitutional: well-developed and well-nourished. No respiratory distress. Head: Normocephalic and atraumatic. Eyes: Pupils are equal, round  ENT: hearing normal  Neck: supple. No JVD present. Cardiovascular: Normal rate, regular rhythm. Exam reveals no gallop and no friction rub. No murmur heard. Pulmonary/Chest: Effort normal.  Rales in bilateral bases, but clears somewhat with cough. Abdominal: Soft, no tenderness. Musculoskeletal: 1+ pitting left ankle/pedal edema, trace right. + varicose veins  Neurological: alert, oriented. Some short-term memory loss. Skin: Skin is warm and dry. Left chest pacemaker site well-healed. Psychiatric: normal mood and affect. Behavior is normal. Judgment and thought content normal.        Assessment/Plan:       ICD-10-CM ICD-9-CM    1. Cardiac pacemaker in situ Z95.0 V45.01    2. Complete heart block (HCC) I44.2 426.0    3. Varicose veins of leg with edema, bilateral I83.893 454.8      Ms. Refo is doing well s/p dual chamber Biotronik pacemaker implant 12/2017. Pacemaker check shows proper device function. Continue remote pacemaker transmissions. Continue current medications as previously prescribed. Daughter reiterates that patient lives at [facility], where there is a physician on staff who takes care of most of her medical issues. Her daughter thinks it is difficult to keep in-clinic cardiology follow up at this time. Should cardiac issues arise, patient's daughter is aware to schedule appointment with Dr. Han Louis.     Future Appointments  Date Time Provider Juan Miguel Garcia   7/25/2018 9:00 AM REMOTE1, SUNIL MASSEY SCHED   10/31/2018 8:30 AM REMOTE1, [address]   2/6/2019 9:15 AM REMOTE1, [address]   5/9/2019 10:30 AM PACEMAKER3, SUNIL MASSEY SCHED   5/9/2019 10:40 AM Dee Bellamy  [address]           Thank you for involving me in this patient's care and please call with further concerns or questions. Soy Mcarthur M.D.   Electrophysiology/Cardiology  [facility] [place]  [address], [place] [address], [facility] [address]  [address]  [phone]

## 2018-07-25 ENCOUNTER — OFFICE VISIT (OUTPATIENT)
Dept: CARDIOLOGY CLINIC | Age: 83
End: 2018-07-25

## 2018-07-25 DIAGNOSIS — Z95.0 CARDIAC PACEMAKER IN SITU: Primary | ICD-10-CM

## 2018-10-16 ENCOUNTER — DOCUMENTATION ONLY (OUTPATIENT)
Dept: CARDIOLOGY CLINIC | Age: 83
End: 2018-10-16

## 2018-10-16 NOTE — PROGRESS NOTES
Pacer alerted atrial fibrillation  She has poor memory and ambulate with walker  Daughter reiterateed at the last office visit that patient lives at [facility], where there is a physician on staff who takes care of most of her medical issues.       Her daughter thinks it is difficult to keep in-clinic cardiology follow up  patient's daughter was aware to schedule appointment with Dr. Maria Luz Gu.   I do not think she is candidate for anticoagulant given the circumstances above

## 2018-10-31 ENCOUNTER — OFFICE VISIT (OUTPATIENT)
Dept: CARDIOLOGY CLINIC | Age: 83
End: 2018-10-31

## 2018-10-31 DIAGNOSIS — Z95.0 CARDIAC PACEMAKER IN SITU: Primary | ICD-10-CM

## 2019-02-05 ENCOUNTER — HOSPITAL ENCOUNTER (OUTPATIENT)
Dept: PREADMISSION TESTING | Age: 84
Discharge: HOME | End: 2019-02-05
Payer: MEDICARE

## 2019-02-05 VITALS
HEART RATE: 109 BPM | SYSTOLIC BLOOD PRESSURE: 142 MMHG | BODY MASS INDEX: 21.41 KG/M2 | HEIGHT: 64 IN | WEIGHT: 125.4 LBS | DIASTOLIC BLOOD PRESSURE: 92 MMHG | TEMPERATURE: 97.2 F | RESPIRATION RATE: 16 BRPM

## 2019-02-05 LAB
ANION GAP SERPL CALC-SCNC: 10 MMOL/L (ref 5–15)
ATRIAL RATE: 93 BPM
BASOPHILS # BLD: 0 K/UL (ref 0–0.1)
BASOPHILS NFR BLD: 0 % (ref 0–1)
BUN SERPL-MCNC: 28 MG/DL (ref 6–20)
BUN/CREAT SERPL: 24 (ref 12–20)
CALCIUM SERPL-MCNC: 9.1 MG/DL (ref 8.5–10.1)
CALCULATED P AXIS, ECG09: 53 DEGREES
CALCULATED R AXIS, ECG10: -92 DEGREES
CALCULATED T AXIS, ECG11: 66 DEGREES
CHLORIDE SERPL-SCNC: 107 MMOL/L (ref 97–108)
CO2 SERPL-SCNC: 25 MMOL/L (ref 21–32)
CREAT SERPL-MCNC: 1.18 MG/DL (ref 0.55–1.02)
DIAGNOSIS, 93000: NORMAL
DIFFERENTIAL METHOD BLD: ABNORMAL
EOSINOPHIL # BLD: 0.5 K/UL (ref 0–0.4)
EOSINOPHIL NFR BLD: 5 % (ref 0–7)
ERYTHROCYTE [DISTWIDTH] IN BLOOD BY AUTOMATED COUNT: 13.2 % (ref 11.5–14.5)
GLUCOSE SERPL-MCNC: 91 MG/DL (ref 65–100)
HCT VFR BLD AUTO: 42.3 % (ref 35–47)
HGB BLD-MCNC: 13 G/DL (ref 11.5–16)
IMM GRANULOCYTES # BLD AUTO: 0 K/UL (ref 0–0.04)
IMM GRANULOCYTES NFR BLD AUTO: 0 % (ref 0–0.5)
LYMPHOCYTES # BLD: 1 K/UL (ref 0.8–3.5)
LYMPHOCYTES NFR BLD: 11 % (ref 12–49)
MCH RBC QN AUTO: 29.3 PG (ref 26–34)
MCHC RBC AUTO-ENTMCNC: 30.7 G/DL (ref 30–36.5)
MCV RBC AUTO: 95.5 FL (ref 80–99)
MONOCYTES # BLD: 0.6 K/UL (ref 0–1)
MONOCYTES NFR BLD: 6 % (ref 5–13)
NEUTS SEG # BLD: 7.2 K/UL (ref 1.8–8)
NEUTS SEG NFR BLD: 77 % (ref 32–75)
NRBC # BLD: 0 K/UL (ref 0–0.01)
NRBC BLD-RTO: 0 PER 100 WBC
P-R INTERVAL, ECG05: 150 MS
PLATELET # BLD AUTO: 272 K/UL (ref 150–400)
PMV BLD AUTO: 11.6 FL (ref 8.9–12.9)
POTASSIUM SERPL-SCNC: 4 MMOL/L (ref 3.5–5.1)
Q-T INTERVAL, ECG07: 424 MS
QRS DURATION, ECG06: 158 MS
QTC CALCULATION (BEZET), ECG08: 527 MS
RBC # BLD AUTO: 4.43 M/UL (ref 3.8–5.2)
SODIUM SERPL-SCNC: 142 MMOL/L (ref 136–145)
VENTRICULAR RATE, ECG03: 93 BPM
WBC # BLD AUTO: 9.4 K/UL (ref 3.6–11)

## 2019-02-05 PROCEDURE — 80048 BASIC METABOLIC PNL TOTAL CA: CPT

## 2019-02-05 PROCEDURE — 85025 COMPLETE CBC W/AUTO DIFF WBC: CPT

## 2019-02-05 PROCEDURE — 93005 ELECTROCARDIOGRAM TRACING: CPT

## 2019-02-05 PROCEDURE — 36415 COLL VENOUS BLD VENIPUNCTURE: CPT

## 2019-02-05 RX ORDER — VALSARTAN AND HYDROCHLOROTHIAZIDE 160; 25 MG/1; MG/1
1 TABLET ORAL DAILY
COMMUNITY

## 2019-02-05 RX ORDER — BENZONATATE 200 MG/1
200 CAPSULE ORAL
COMMUNITY

## 2019-02-05 RX ORDER — IPRATROPIUM BROMIDE AND ALBUTEROL SULFATE 2.5; .5 MG/3ML; MG/3ML
3 SOLUTION RESPIRATORY (INHALATION) 4 TIMES DAILY
Status: ON HOLD | COMMUNITY
End: 2019-02-19

## 2019-02-05 NOTE — PERIOP NOTES
DO NOT EAT OR DRINK ANYTHING AFTER MIDNIGHT, except as instructed THE NIGHT BEFORE SURGERY. PT GIVEN OPPORTUNITY TO ASK ADDITIONAL QUESTIONS. Patient given CHG wipes and instruction sheet, instructions for use reviewed with patient. Patient given surgical site infection information FAQs handout and hand hygiene tips sheet. Pre-operative instructions reviewed and patient verbalizes understanding of instructions. Patient has been given the opportunity to ask additional questions. WOLFGANG PIKELS-DAUGHTER ASSISTED WITH ANSWERING OF SOME QUESTIONS; SHE ALSO HAS POA.

## 2019-02-06 ENCOUNTER — OFFICE VISIT (OUTPATIENT)
Dept: CARDIOLOGY CLINIC | Age: 84
End: 2019-02-06

## 2019-02-06 DIAGNOSIS — Z95.0 CARDIAC PACEMAKER IN SITU: Primary | ICD-10-CM

## 2019-02-07 NOTE — PERIOP NOTES
Attempted to fax records request to [facility] for notes from Olayinka Sullivan NP but could not get request to go through x 7. Called Dr. Bruce Perla office at [facility] and left message on voicemail asking for copy of notes from [facility] NP, Olayinka Sullivan if they have a copy.

## 2019-02-07 NOTE — ADVANCED PRACTICE NURSE
Discussed PMHx including MAC symptoms and medications, functional status, surgery scheduled 2/19 with Dr Gilberto Beckford. No further eval needed at this time.

## 2019-02-18 ENCOUNTER — ANESTHESIA EVENT (OUTPATIENT)
Dept: MEDSURG UNIT | Age: 84
End: 2019-02-18
Payer: MEDICARE

## 2019-02-19 ENCOUNTER — HOSPITAL ENCOUNTER (OUTPATIENT)
Age: 84
Setting detail: OUTPATIENT SURGERY
Discharge: HOME | End: 2019-02-19
Attending: PLASTIC SURGERY | Admitting: PLASTIC SURGERY
Payer: MEDICARE

## 2019-02-19 ENCOUNTER — ANESTHESIA (OUTPATIENT)
Dept: MEDSURG UNIT | Age: 84
End: 2019-02-19
Payer: MEDICARE

## 2019-02-19 VITALS
WEIGHT: 125 LBS | HEIGHT: 64 IN | OXYGEN SATURATION: 96 % | DIASTOLIC BLOOD PRESSURE: 79 MMHG | RESPIRATION RATE: 20 BRPM | SYSTOLIC BLOOD PRESSURE: 121 MMHG | TEMPERATURE: 97.1 F | BODY MASS INDEX: 21.34 KG/M2 | HEART RATE: 70 BPM

## 2019-02-19 DIAGNOSIS — C44.602 SKIN CANCER OF ARM, RIGHT: Primary | ICD-10-CM

## 2019-02-19 PROCEDURE — 74011250636 HC RX REV CODE- 250/636: Performed by: PLASTIC SURGERY

## 2019-02-19 PROCEDURE — 77030018836 HC SOL IRR NACL ICUM -A: Performed by: PLASTIC SURGERY

## 2019-02-19 PROCEDURE — 77030003028 HC SUT VCRL J&J -A: Performed by: PLASTIC SURGERY

## 2019-02-19 PROCEDURE — 88305 TISSUE EXAM BY PATHOLOGIST: CPT

## 2019-02-19 PROCEDURE — 74011000250 HC RX REV CODE- 250

## 2019-02-19 PROCEDURE — 76210000034 HC AMBSU PH I REC 0.5 TO 1 HR: Performed by: PLASTIC SURGERY

## 2019-02-19 PROCEDURE — 77030011640 HC PAD GRND REM COVD -A: Performed by: PLASTIC SURGERY

## 2019-02-19 PROCEDURE — 76210000046 HC AMBSU PH II REC FIRST 0.5 HR: Performed by: PLASTIC SURGERY

## 2019-02-19 PROCEDURE — 77030008467 HC STPLR SKN COVD -B: Performed by: PLASTIC SURGERY

## 2019-02-19 PROCEDURE — 74011000250 HC RX REV CODE- 250: Performed by: PLASTIC SURGERY

## 2019-02-19 PROCEDURE — 77030002986 HC SUT PROL J&J -A: Performed by: PLASTIC SURGERY

## 2019-02-19 PROCEDURE — 77030019908 HC STETH ESOPH SIMS -A: Performed by: ANESTHESIOLOGY

## 2019-02-19 PROCEDURE — 74011250636 HC RX REV CODE- 250/636

## 2019-02-19 PROCEDURE — 77030002996 HC SUT SLK J&J -A: Performed by: PLASTIC SURGERY

## 2019-02-19 PROCEDURE — 77030002916 HC SUT ETHLN J&J -A: Performed by: PLASTIC SURGERY

## 2019-02-19 PROCEDURE — 77030033138 HC SUT PGA STRATFX J&J -B: Performed by: PLASTIC SURGERY

## 2019-02-19 PROCEDURE — 76060000062 HC AMB SURG ANES 1 TO 1.5 HR: Performed by: PLASTIC SURGERY

## 2019-02-19 PROCEDURE — 77030020782 HC GWN BAIR PAWS FLX 3M -B

## 2019-02-19 PROCEDURE — 76030000001 HC AMB SURG OR TIME 1 TO 1.5: Performed by: PLASTIC SURGERY

## 2019-02-19 RX ORDER — SODIUM CHLORIDE 0.9 % (FLUSH) 0.9 %
5-40 SYRINGE (ML) INJECTION AS NEEDED
Status: DISCONTINUED | OUTPATIENT
Start: 2019-02-19 | End: 2019-02-19 | Stop reason: HOSPADM

## 2019-02-19 RX ORDER — SODIUM CHLORIDE, SODIUM LACTATE, POTASSIUM CHLORIDE, CALCIUM CHLORIDE 600; 310; 30; 20 MG/100ML; MG/100ML; MG/100ML; MG/100ML
125 INJECTION, SOLUTION INTRAVENOUS CONTINUOUS
Status: DISCONTINUED | OUTPATIENT
Start: 2019-02-19 | End: 2019-02-19 | Stop reason: HOSPADM

## 2019-02-19 RX ORDER — MORPHINE SULFATE 10 MG/ML
2 INJECTION, SOLUTION INTRAMUSCULAR; INTRAVENOUS
Status: DISCONTINUED | OUTPATIENT
Start: 2019-02-19 | End: 2019-02-19 | Stop reason: HOSPADM

## 2019-02-19 RX ORDER — SODIUM CHLORIDE 0.9 % (FLUSH) 0.9 %
5-40 SYRINGE (ML) INJECTION EVERY 8 HOURS
Status: DISCONTINUED | OUTPATIENT
Start: 2019-02-19 | End: 2019-02-19 | Stop reason: HOSPADM

## 2019-02-19 RX ORDER — FENTANYL CITRATE 50 UG/ML
25 INJECTION, SOLUTION INTRAMUSCULAR; INTRAVENOUS
Status: DISCONTINUED | OUTPATIENT
Start: 2019-02-19 | End: 2019-02-19 | Stop reason: HOSPADM

## 2019-02-19 RX ORDER — SODIUM CHLORIDE, SODIUM LACTATE, POTASSIUM CHLORIDE, CALCIUM CHLORIDE 600; 310; 30; 20 MG/100ML; MG/100ML; MG/100ML; MG/100ML
INJECTION, SOLUTION INTRAVENOUS
Status: DISCONTINUED | OUTPATIENT
Start: 2019-02-19 | End: 2019-02-19 | Stop reason: HOSPADM

## 2019-02-19 RX ORDER — PROPOFOL 10 MG/ML
INJECTION, EMULSION INTRAVENOUS
Status: DISCONTINUED | OUTPATIENT
Start: 2019-02-19 | End: 2019-02-19 | Stop reason: HOSPADM

## 2019-02-19 RX ORDER — MIDAZOLAM HYDROCHLORIDE 1 MG/ML
1 INJECTION, SOLUTION INTRAMUSCULAR; INTRAVENOUS AS NEEDED
Status: DISCONTINUED | OUTPATIENT
Start: 2019-02-19 | End: 2019-02-19 | Stop reason: HOSPADM

## 2019-02-19 RX ORDER — PROPOFOL 10 MG/ML
INJECTION, EMULSION INTRAVENOUS AS NEEDED
Status: DISCONTINUED | OUTPATIENT
Start: 2019-02-19 | End: 2019-02-19 | Stop reason: HOSPADM

## 2019-02-19 RX ORDER — DIPHENHYDRAMINE HYDROCHLORIDE 50 MG/ML
12.5 INJECTION, SOLUTION INTRAMUSCULAR; INTRAVENOUS AS NEEDED
Status: DISCONTINUED | OUTPATIENT
Start: 2019-02-19 | End: 2019-02-19 | Stop reason: HOSPADM

## 2019-02-19 RX ORDER — MIDAZOLAM HYDROCHLORIDE 1 MG/ML
1 INJECTION, SOLUTION INTRAMUSCULAR; INTRAVENOUS
Status: DISCONTINUED | OUTPATIENT
Start: 2019-02-19 | End: 2019-02-19 | Stop reason: HOSPADM

## 2019-02-19 RX ORDER — OXYCODONE HYDROCHLORIDE 5 MG/1
5 TABLET ORAL AS NEEDED
Status: DISCONTINUED | OUTPATIENT
Start: 2019-02-19 | End: 2019-02-19 | Stop reason: HOSPADM

## 2019-02-19 RX ORDER — MIDAZOLAM HYDROCHLORIDE 1 MG/ML
INJECTION, SOLUTION INTRAMUSCULAR; INTRAVENOUS AS NEEDED
Status: DISCONTINUED | OUTPATIENT
Start: 2019-02-19 | End: 2019-02-19 | Stop reason: HOSPADM

## 2019-02-19 RX ORDER — DEXTROSE, SODIUM CHLORIDE, SODIUM LACTATE, POTASSIUM CHLORIDE, AND CALCIUM CHLORIDE 5; .6; .31; .03; .02 G/100ML; G/100ML; G/100ML; G/100ML; G/100ML
125 INJECTION, SOLUTION INTRAVENOUS CONTINUOUS
Status: DISCONTINUED | OUTPATIENT
Start: 2019-02-19 | End: 2019-02-19 | Stop reason: HOSPADM

## 2019-02-19 RX ORDER — LIDOCAINE HYDROCHLORIDE 10 MG/ML
0.5 INJECTION, SOLUTION EPIDURAL; INFILTRATION; INTRACAUDAL; PERINEURAL AS NEEDED
Status: DISCONTINUED | OUTPATIENT
Start: 2019-02-19 | End: 2019-02-19 | Stop reason: HOSPADM

## 2019-02-19 RX ORDER — ONDANSETRON 2 MG/ML
4 INJECTION INTRAMUSCULAR; INTRAVENOUS AS NEEDED
Status: DISCONTINUED | OUTPATIENT
Start: 2019-02-19 | End: 2019-02-19 | Stop reason: HOSPADM

## 2019-02-19 RX ORDER — OXYCODONE AND ACETAMINOPHEN 5; 325 MG/1; MG/1
1 TABLET ORAL
Qty: 20 TAB | Refills: 0 | Status: SHIPPED | OUTPATIENT
Start: 2019-02-19 | End: 2019-05-15

## 2019-02-19 RX ORDER — LIDOCAINE HYDROCHLORIDE AND EPINEPHRINE 10; 10 MG/ML; UG/ML
30 INJECTION, SOLUTION INFILTRATION; PERINEURAL ONCE
Status: CANCELLED | OUTPATIENT
Start: 2019-02-19 | End: 2019-02-19

## 2019-02-19 RX ORDER — CEFAZOLIN SODIUM/WATER 2 G/20 ML
2 SYRINGE (ML) INTRAVENOUS ONCE
Status: COMPLETED | OUTPATIENT
Start: 2019-02-19 | End: 2019-02-19

## 2019-02-19 RX ORDER — FENTANYL CITRATE 50 UG/ML
50 INJECTION, SOLUTION INTRAMUSCULAR; INTRAVENOUS AS NEEDED
Status: DISCONTINUED | OUTPATIENT
Start: 2019-02-19 | End: 2019-02-19 | Stop reason: HOSPADM

## 2019-02-19 RX ORDER — ONDANSETRON 2 MG/ML
INJECTION INTRAMUSCULAR; INTRAVENOUS AS NEEDED
Status: DISCONTINUED | OUTPATIENT
Start: 2019-02-19 | End: 2019-02-19 | Stop reason: HOSPADM

## 2019-02-19 RX ORDER — BUPIVACAINE HYDROCHLORIDE AND EPINEPHRINE 2.5; 5 MG/ML; UG/ML
10 INJECTION, SOLUTION EPIDURAL; INFILTRATION; INTRACAUDAL; PERINEURAL ONCE
Status: CANCELLED | OUTPATIENT
Start: 2019-02-19 | End: 2019-02-19

## 2019-02-19 RX ORDER — DEXMEDETOMIDINE HYDROCHLORIDE 4 UG/ML
INJECTION, SOLUTION INTRAVENOUS AS NEEDED
Status: DISCONTINUED | OUTPATIENT
Start: 2019-02-19 | End: 2019-02-19 | Stop reason: HOSPADM

## 2019-02-19 RX ADMIN — PROPOFOL 25 MCG/KG/MIN: 10 INJECTION, EMULSION INTRAVENOUS at 07:34

## 2019-02-19 RX ADMIN — DEXMEDETOMIDINE HYDROCHLORIDE 5 MCG: 4 INJECTION, SOLUTION INTRAVENOUS at 07:35

## 2019-02-19 RX ADMIN — ONDANSETRON 4 MG: 2 INJECTION INTRAMUSCULAR; INTRAVENOUS at 07:49

## 2019-02-19 RX ADMIN — PROPOFOL 30 MG: 10 INJECTION, EMULSION INTRAVENOUS at 08:02

## 2019-02-19 RX ADMIN — MIDAZOLAM HYDROCHLORIDE 1 MG: 1 INJECTION, SOLUTION INTRAMUSCULAR; INTRAVENOUS at 07:29

## 2019-02-19 RX ADMIN — SODIUM CHLORIDE, SODIUM LACTATE, POTASSIUM CHLORIDE, CALCIUM CHLORIDE: 600; 310; 30; 20 INJECTION, SOLUTION INTRAVENOUS at 07:30

## 2019-02-19 RX ADMIN — Medication 1 G: at 07:35

## 2019-02-19 RX ADMIN — PROPOFOL 40 MG: 10 INJECTION, EMULSION INTRAVENOUS at 07:34

## 2019-02-19 RX ADMIN — PROPOFOL 30 MG: 10 INJECTION, EMULSION INTRAVENOUS at 08:09

## 2019-02-19 NOTE — DISCHARGE INSTRUCTIONS
Martha Pineda. Adelita Melendez M.D. Instructions after Minor Plastic Surgery Procedures      You have had a minor surgical procedure. Please follow these simple instructions to help ensure a safe and comfortable recovery. Any questions can be directed to the office at [phone]. 1. If a bandage has been placed, it can be removed or changed at 24-48 hours after surgery. Wounds of the scalp are not usually bandaged, except in special situations. 2. Expect a modest amount of redness (inflammation) and possibly some bruising to appear in the days following your surgery. This is normal and will resolve as the wound heals, but may persist until the stitches have been removed. 3. The appearance of excessive wound redness, pus or fever is not normal and should be reported to the office. 4. Wounds may be gently washed with mild soap and water beginning 24 hours after surgery, then patted dry. Scalp wounds may be gently washed (mild shampoo) and gently dried as well. 5. Antibiotic ointment should be lightly applied 2-3 times per day to any wound. Replace Band-Aid or other dressing as instructed. 6. Suture removal will be arranged in 5-14 days, depending upon the site. The pathologists report will be discussed with you then. Your appointment is _call to make appointment in 2 weeks _____. 7. Mild to moderate pain is to be expected after minor surgery and will generally be relieved by the use of aspirin, Tylenol or ibuprofen agents (Advil, Motrin, Nuprin, etc.) You have been given a prescription for __Percocet__. 8. Swelling or discomfort will be improved by elevating the surgical site above the level of the heart, especially the face, scalp or arms, which can be elevated in bed by extra pillows. 9. Do not be concerned if one or even several sutures come out prior to the time of suture removal. It is not unusual for this to occur as the wound swelling decreases.  Support of the remaining sutures is sufficient to protect your wound(s). 10. If appropriate, copies of the surgery and pathology reports will be sent to your doctor. 11. Please call the office at [phone] if you have any questions. I acknowledge receipt of the above discharge instructions:    Patient/Guardian Signature _______________________________________ Date___________________    [place] Signature_______________________________________________ Date___________________        DISCHARGE SUMMARY from Nurse    PATIENT INSTRUCTIONS:    After general anesthesia or intravenous sedation, for 24 hours or while taking prescription Narcotics:  · Limit your activities  · Do not drive and operate hazardous machinery  · Do not make important personal or business decisions  · Do  not drink alcoholic beverages  · If you have not urinated within 8 hours after discharge, please contact your surgeon on call. Report the following to your surgeon:  · Excessive pain, swelling, redness or odor of or around the surgical area  · Temperature over 100.5  · Nausea and vomiting lasting longer than 4 hours or if unable to take medications  · Any signs of decreased circulation or nerve impairment to extremity: change in color, persistent  numbness, tingling, coldness or increase pain  · Any questions    What to do at Home:    If you experience any of the above symptoms, please follow up with Dr. Lucy Marroquin    *  Please give a list of your current medications to your Primary Care Provider. *  Please update this list whenever your medications are discontinued, doses are      changed, or new medications (including over-the-counter products) are added. *  Please carry medication information at all times in case of emergency situations. These are general instructions for a healthy lifestyle:    No smoking/ No tobacco products/ Avoid exposure to second hand smoke  Surgeon General's Warning:  Quitting smoking now greatly reduces serious risk to your health.     Obesity, smoking, and sedentary lifestyle greatly increases your risk for illness    A healthy diet, regular physical exercise & weight monitoring are important for maintaining a healthy lifestyle    You may be retaining fluid if you have a history of heart failure or if you experience any of the following symptoms:  Weight gain of 3 pounds or more overnight or 5 pounds in a week, increased swelling in our hands or feet or shortness of breath while lying flat in bed. Please call your doctor as soon as you notice any of these symptoms; do not wait until your next office visit. Recognize signs and symptoms of STROKE:    F-face looks uneven    A-arms unable to move or move unevenly    S-speech slurred or non-existent    T-time-call 911 as soon as signs and symptoms begin-DO NOT go       Back to bed or wait to see if you get better-TIME IS BRAIN. Warning Signs of HEART ATTACK     Call 911 if you have these symptoms:   Chest discomfort. Most heart attacks involve discomfort in the center of the chest that lasts more than a few minutes, or that goes away and comes back. It can feel like uncomfortable pressure, squeezing, fullness, or pain.  Discomfort in other areas of the upper body. Symptoms can include pain or discomfort in one or both arms, the back, neck, jaw, or stomach.  Shortness of breath with or without chest discomfort.  Other signs may include breaking out in a cold sweat, nausea, or lightheadedness. Don't wait more than five minutes to call 911 - MINUTES MATTER! Fast action can save your life. Calling 911 is almost always the fastest way to get lifesaving treatment. Emergency Medical Services staff can begin treatment when they arrive -- up to an hour sooner than if someone gets to the hospital by car. The discharge information has been reviewed with the patient and caregiver. The patient and caregiver verbalized understanding.   Discharge medications reviewed with the patient and caregiver and appropriate educational materials and side effects teaching were provided.

## 2019-02-19 NOTE — ROUTINE PROCESS
Patient: Rajan Blair MRN: [id number]  SSN: [id number]   YOB: 1924  Age: 80 y.o. Sex: female     Patient is status post Procedure(s):  WIDE LOCAL EXCISION RIGHT ARM SQUAMOUS CELL CARCINOMA  AND FLAP CLOSURE. Surgeon(s) and Role:     * Yadira Joy MD - Primary    Local/Dose/Irrigation:                    Peripheral IV 02/19/19 Right Forearm (Active)   Site Assessment Clean, dry, & intact 2/19/2019  7:24 AM   Phlebitis Assessment 0 2/19/2019  7:24 AM   Infiltration Assessment 0 2/19/2019  7:24 AM   Dressing Status Clean, dry, & intact 2/19/2019  7:24 AM   Dressing Type Transparent;Tape 2/19/2019  7:24 AM   Hub Color/Line Status Blue; Infusing 2/19/2019  7:24 AM                           Dressing/Packing:  Wound Arm Right-Dressing Type : (4x4 tape) (02/19/19 0700)  Splint/Cast:  ]    Other:

## 2019-02-19 NOTE — H&P
Surgery History and Physical    Subjective:      Deejay Moncada is a 80 y.o.  female who presents with a large skin cancer on her right arm. Patient Active Problem List    Diagnosis Date Noted    S/P cardiac pacemaker procedure 12/13/2017    Complete heart block ([facility] [address] 12/12/2017    Healthcare-associated pneumonia 01/27/2016     Past Medical History:   Diagnosis Date    Bronchiectasis ([facility] [address]     Cancer ([facility] [address]     SCC    Dementia     Gastrointestinal disorder     benign tumor in colon 1982  /  h/o Celiac disease    GERD (gastroesophageal reflux disease)     Hx of colonic polyps     Hypertension     Kidney stones     Mastoiditis     Mycobacterium avium infection ([facility] [address]     Other ill-defined conditions(799.89)     microbacterium isidra- intracellular lung disease    PNA (pneumonia)     Vertigo       Past Surgical History:   Procedure Laterality Date    ABDOMEN SURGERY PROC UNLISTED      colon tumor removed 1982    HX COLECTOMY      HX GI  1980    PARTIAL COLON RESECTION    HX HEENT      AS A CHILD    HX UROLOGICAL      bladder sling 2007    SC INS NEW/RPLCMT PRM PM W/TRANSV ELTRD ATRIAL&VENT  12/13/2017           Social History     Tobacco Use    Smoking status: Never Smoker    Smokeless tobacco: Never Used   Substance Use Topics    Alcohol use: No      Family History   Problem Relation Age of Onset    No Known Problems Daughter     No Known Problems Daughter     No Known Problems Daughter     No Known Problems Daughter     Anesth Problems Neg Hx       Prior to Admission medications    Medication Sig Start Date End Date Taking? Authorizing Provider   valsartan-hydroCHLOROthiazide (DIOVAN-HCT) 160-25 mg per tablet Take 1 Tab by mouth daily. Yes Provider, Historical   omeprazole (PRILOSEC) 20 mg capsule Take 20 mg by mouth daily. Yes Provider, Historical   fluticasone-vilanterol (BREO ELLIPTA) 100-25 mcg/dose inhaler Take 1 Puff by inhalation daily.    Yes Provider, Historical albuterol-ipratropium (DUO-NEB) 2.5 mg-0.5 mg/3 ml nebu 3 mL by Nebulization route every six (6) hours as needed. Yes Provider, Historical   Saccharomyces boulardii (FLORASTOR) 250 mg capsule Take 250 mg by mouth daily. Yes Provider, Historical   benzonatate (TESSALON) 200 mg capsule Take 200 mg by mouth three (3) times daily as needed for Cough. Provider, Historical   azithromycin (ZITHROMAX) 250 mg tablet Take 250 mg by mouth daily. Provider, Historical   ethambutol (MYAMBUTOL) 400 mg tablet Take 800 mg by mouth daily. Indications: pulmonary myobacterial infection    Provider, Historical   loperamide (IMODIUM) 2 mg capsule Take 2 mg by mouth as needed for Diarrhea (after every loose stool, maximum of 6 caps/24 hours). Provider, Historical   meclizine (ANTIVERT) 12.5 mg tablet Take 12.5 mg by mouth nightly as needed. Indications: Vertigo    Provider, Historical   multivitamin (ONE A DAY) tablet Take 1 Tab by mouth daily. Provider, Historical   guaiFENesin (ROBITUSSIN MUCUS-CHEST CONGEST) 100 mg/5 mL liquid Take 200 mg by mouth every four (4) hours as needed for Cough. Provider, Historical   peg 400-propylene glycol (SYSTANE, PROPYLENE GLYCOL,) 0.4-0.3 % drop 1 Drop three (3) times daily. Provider, Historical   cholecalciferol, vitamin D3, (VITAMIN D3) 2,000 unit tab Take 1 Tab by mouth daily. Provider, Historical   acetaminophen (TYLENOL) 325 mg tablet Take 2 Tabs by mouth every four (4) hours as needed. Patient taking differently: Take 650 mg by mouth every four (4) hours as needed for Pain. 16   Christine Kayser, MD     No Known Allergies      Review of Systems:    A comprehensive review of systems was negative except for that written in the History of Present Illness.     Objective:     Patient Vitals for the past 8 hrs:   BP Temp Pulse Resp SpO2 Height Weight   19 0723 153/77 97 °F (36.1 °C) 83 16 96 % 5' 3.75\" (1.619 m) 56.7 kg (125 lb)       Temp (24hrs), Av °F (36.1 °C), Min:97 °F (36.1 °C), Max:97 °F (36.1 °C)      Physical Exam:  General:  Alert, cooperative, no distress, appears stated age. Eyes:  Conjunctivae/corneas clear. PERRL, EOMs intact. Fundi benign   Ears:  Normal TMs and external ear canals both ears. Nose: Nares normal. Septum midline. Mucosa normal. No drainage or sinus tenderness. Mouth/Throat: Lips, mucosa, and tongue normal. Teeth and gums normal.   Neck: Supple, symmetrical, trachea midline, no adenopathy, thyroid: no enlargment/tenderness/nodules, no carotid bruit and no JVD. Back:   Symmetric, no curvature. ROM normal. No CVA tenderness. Lungs:   Clear to auscultation bilaterally. Heart:  Regular rate and rhythm, S1, S2 normal, no murmur, click, rub or gallop. Abdomen:   Soft, non-tender. Bowel sounds normal. No masses,  No organomegaly. Extremities: Extremities normal, atraumatic, no cyanosis or edema. Pulses: 2+ and symmetric all extremities. Skin: Mildly cachectic, large tumor right upper arm   Lymph nodes: Cervical, supraclavicular, and axillary nodes normal.   Neurologic: CNII-XII intact. Normal strength, sensation and reflexes throughout. Labs: No results found for this or any previous visit (from the past 24 hour(s)). Data Review:    CBC:   Lab Results   Component Value Date/Time    WBC 9.4 02/05/2019 10:32 AM    RBC 4.43 02/05/2019 10:32 AM    HGB 13.0 02/05/2019 10:32 AM    HCT 42.3 02/05/2019 10:32 AM    PLATELET 183 21/21/3456 10:32 AM        Assessment:     Active Problems:    * No active hospital problems. *      Plan:      Wide local excision , flap closure    Signed By: Ludin Hilton MD     February 19, 2019

## 2019-02-19 NOTE — ANESTHESIA PREPROCEDURE EVALUATION
Anesthetic History No history of anesthetic complications Review of Systems / Medical History Patient summary reviewed, nursing notes reviewed and pertinent labs reviewed Pulmonary Within defined limits Neuro/Psych Within defined limits Cardiovascular Within defined limits Hypertension GI/Hepatic/Renal 
Within defined limits GERD Endo/Other Within defined limits Cancer Other Findings Physical Exam 
 
Airway Mallampati: II 
TM Distance: > 6 cm Neck ROM: normal range of motion Mouth opening: Normal 
 
 Cardiovascular Regular rate and rhythm,  S1 and S2 normal,  no murmur, click, rub, or gallop Dental 
No notable dental hx Pulmonary Breath sounds clear to auscultation Abdominal 
GI exam deferred Other Findings Anesthetic Plan ASA: 2 Anesthesia type: MAC Anesthetic plan and risks discussed with: Patient

## 2019-02-19 NOTE — ANESTHESIA POSTPROCEDURE EVALUATION
Procedure(s): WIDE LOCAL EXCISION RIGHT ARM SQUAMOUS CELL CARCINOMA  AND FLAP CLOSURE. Anesthesia Post Evaluation Patient location during evaluation: PACU Patient participation: complete - patient participated Level of consciousness: awake and alert Pain management: adequate Airway patency: patent Anesthetic complications: no 
Cardiovascular status: acceptable Respiratory status: acceptable Hydration status: acceptable Comments: I have seen and evaluated the patient and is ready for discharge. Iesha Ingram MD 
 
Post anesthesia nausea and vomiting:  none Visit Vitals /76 (BP 1 Location: Left arm, BP Patient Position: Sitting) Pulse 68 Temp 36.1 °C (97 °F) Resp 19 Ht 5' 3.75\" (1.619 m) Wt 56.7 kg (125 lb) SpO2 95% BMI 21.62 kg/m²

## 2019-02-19 NOTE — BRIEF OP NOTE
BRIEF OPERATIVE NOTE    Date of Procedure: 2/19/2019   Preoperative Diagnosis: SCCA RIGHT ARM  Postoperative Diagnosis: SCCA RIGHT ARM    Procedure(s):  WIDE LOCAL EXCISION RIGHT ARM SQUAMOUS CELL CARCINOMA  AND FLAP CLOSURE  Surgeon(s) and Role:     * Luzma Butler MD - Primary         Surgical Assistant: none    Surgical Staff:  Circ-1: Bridger Dowell RN  Scrub Tech-1: Kristie Vogel  Event Time In Time Out   Incision Start 1420    Incision Close 3066      Anesthesia: MAC   Estimated Blood Loss: minimal  Specimens:   ID Type Source Tests Collected by Time Destination   1 : skin cancer right arm long stitch anterior margin short stitch crainal margin Fresh Arm, Right  Luzma Butler MD 2/19/2019 0803 Pathology      Findings: large skin cancer   Complications: none  Implants: * No implants in log *

## 2019-02-20 NOTE — OP NOTES
[facility]  OPERATIVE REPORT    Name:  Dallas Dowd  MR#:  [id number]  :  1924  ACCOUNT #:  [de-identified]  DATE OF SERVICE:  2019      PREOPERATIVE DIAGNOSIS:  Large skin cancer, right upper arm. POSTOPERATIVE DIAGNOSIS:  Large skin cancer, right upper arm. PROCEDURE PERFORMED:  Wide local excision of 7 cm skin cancer, right upper extremity  with flap closure. SURGEON:  Jhoan Lr MD    ASSISTANT:  OR Staff. ANESTHESIA:  Local with IV sedation. COMPLICATIONS:  None. SPECIMENS REMOVED:  Skin cancer x1, right upper extremity. IMPLANTS:  none. ESTIMATED BLOOD LOSS:  Minimal.    DRAINS AND TUBES:  None. INDICATIONS:  The patient is a 51-year-old  female with multiple medical  problems. She has had a large fungating mass on her right upper extremity which is  clearly some type of skin cancer. She is here today for excision and closure. Prior  to procedure, the risks and benefits of surgery were discussed with the patient. Informed consent was obtained. DESCRIPTION OF PROCEDURE:  On the day of surgery, the patient was seen in the  preoperative holding area. The skin cancer was marked. I then brought her to the  operating room. After administration of IV sedation, the right upper extremity was  prepped and draped in standard fashion. Following preoperative time-out, I began the  case. I injected this area with a mixture of 0.25% Marcaine containing epinephrine  mixed with 1% lidocaine containing epinephrine. Also, mixed with an equal volume of  saline. After allowing this to exert its numbing effect, I excised the specimen,  oriented it for pathology and passed it off the field for permanent section. I took  a 1 cm additional margin all the way around this lesion, total size of the defect was  over 7 cm. This was excised all the way through the muscle fascia exposing the  deltoid.   Once this was removed, I then began dissecting more distally within the  lateral intermuscular septum. I exposed some perforators coursing through this and  identified and preserved these vessels. I was then able to rotate the flap from  distal to proximal based on these intermuscular perforators coming off probably  recurrent radial branch of the artery. Once I was able to rotate in place, I then  secured it with a few interrupted Vicryls followed by running deep dermal 2-0  Stratafix reinforced with interrupted 3-0 nylons. The wound was dressed. The  patient was brought from operating room to PACU in stable condition.       MD QUINTON [id number]  D:  02/19/2019 23:33  T:  02/20/2019 10:19  JOB #:  [id number]

## 2019-05-07 ENCOUNTER — HOSPITAL ENCOUNTER (INPATIENT)
Age: 84
LOS: 8 days | Discharge: HOME | DRG: 871 | End: 2019-05-15
Attending: EMERGENCY MEDICINE | Admitting: FAMILY MEDICINE
Payer: MEDICARE

## 2019-05-07 ENCOUNTER — APPOINTMENT (OUTPATIENT)
Dept: INTERVENTIONAL RADIOLOGY/VASCULAR | Age: 84
DRG: 871 | End: 2019-05-07
Attending: FAMILY MEDICINE
Payer: MEDICARE

## 2019-05-07 ENCOUNTER — APPOINTMENT (OUTPATIENT)
Dept: CT IMAGING | Age: 84
DRG: 871 | End: 2019-05-07
Attending: EMERGENCY MEDICINE
Payer: MEDICARE

## 2019-05-07 ENCOUNTER — APPOINTMENT (OUTPATIENT)
Dept: GENERAL RADIOLOGY | Age: 84
DRG: 871 | End: 2019-05-07
Attending: EMERGENCY MEDICINE
Payer: MEDICARE

## 2019-05-07 DIAGNOSIS — J90 PLEURAL EFFUSION: ICD-10-CM

## 2019-05-07 DIAGNOSIS — R41.82 ALTERED MENTAL STATUS, UNSPECIFIED ALTERED MENTAL STATUS TYPE: Primary | ICD-10-CM

## 2019-05-07 DIAGNOSIS — N39.0 URINARY TRACT INFECTION WITHOUT HEMATURIA, SITE UNSPECIFIED: ICD-10-CM

## 2019-05-07 LAB
ALBUMIN SERPL-MCNC: 2.7 G/DL (ref 3.5–5)
ALBUMIN/GLOB SERPL: 0.7 {RATIO} (ref 1.1–2.2)
ALP SERPL-CCNC: 67 U/L (ref 45–117)
ALT SERPL-CCNC: 33 U/L (ref 12–78)
AMPHET UR QL SCN: NEGATIVE
ANION GAP SERPL CALC-SCNC: 10 MMOL/L (ref 5–15)
APPEARANCE UR: ABNORMAL
ARTERIAL PATENCY WRIST A: ABNORMAL
AST SERPL-CCNC: 38 U/L (ref 15–37)
BACTERIA URNS QL MICRO: ABNORMAL /HPF
BARBITURATES UR QL SCN: NEGATIVE
BASE DEFICIT BLD-SCNC: 2 MMOL/L
BASOPHILS # BLD: 0 K/UL (ref 0–0.1)
BASOPHILS NFR BLD: 0 % (ref 0–1)
BDY SITE: ABNORMAL
BENZODIAZ UR QL: NEGATIVE
BILIRUB SERPL-MCNC: 0.6 MG/DL (ref 0.2–1)
BILIRUB UR QL: NEGATIVE
BNP SERPL-MCNC: 929 PG/ML
BUN SERPL-MCNC: 39 MG/DL (ref 6–20)
BUN/CREAT SERPL: 35 (ref 12–20)
CALCIUM SERPL-MCNC: 9.1 MG/DL (ref 8.5–10.1)
CANNABINOIDS UR QL SCN: NEGATIVE
CHLORIDE SERPL-SCNC: 109 MMOL/L (ref 97–108)
CO2 SERPL-SCNC: 23 MMOL/L (ref 21–32)
COCAINE UR QL SCN: NEGATIVE
COLOR UR: ABNORMAL
COMMENT, HOLDF: NORMAL
COMMENT, HOLDF: NORMAL
CREAT SERPL-MCNC: 1.1 MG/DL (ref 0.55–1.02)
DIFFERENTIAL METHOD BLD: ABNORMAL
DRUG SCRN COMMENT,DRGCM: NORMAL
EOSINOPHIL # BLD: 0 K/UL (ref 0–0.4)
EOSINOPHIL NFR BLD: 0 % (ref 0–7)
EPITH CASTS URNS QL MICRO: ABNORMAL /LPF
ERYTHROCYTE [DISTWIDTH] IN BLOOD BY AUTOMATED COUNT: 12.7 % (ref 11.5–14.5)
GAS FLOW.O2 O2 DELIVERY SYS: ABNORMAL L/MIN
GAS FLOW.O2 SETTING OXYMISER: 15 L/M
GLOBULIN SER CALC-MCNC: 3.8 G/DL (ref 2–4)
GLUCOSE BLD STRIP.AUTO-MCNC: 183 MG/DL (ref 65–100)
GLUCOSE SERPL-MCNC: 179 MG/DL (ref 65–100)
GLUCOSE UR STRIP.AUTO-MCNC: NEGATIVE MG/DL
GRAN CASTS URNS QL MICRO: ABNORMAL /LPF
HCO3 BLD-SCNC: 23.5 MMOL/L (ref 22–26)
HCT VFR BLD AUTO: 41 % (ref 35–47)
HGB BLD-MCNC: 13.3 G/DL (ref 11.5–16)
HGB UR QL STRIP: ABNORMAL
IMM GRANULOCYTES # BLD AUTO: 0.2 K/UL (ref 0–0.04)
IMM GRANULOCYTES NFR BLD AUTO: 1 % (ref 0–0.5)
KETONES UR QL STRIP.AUTO: NEGATIVE MG/DL
LACTATE SERPL-SCNC: 2 MMOL/L (ref 0.4–2)
LEUKOCYTE ESTERASE UR QL STRIP.AUTO: ABNORMAL
LYMPHOCYTES # BLD: 0.7 K/UL (ref 0.8–3.5)
LYMPHOCYTES NFR BLD: 3 % (ref 12–49)
MAGNESIUM SERPL-MCNC: 2.3 MG/DL (ref 1.6–2.4)
MCH RBC QN AUTO: 30.6 PG (ref 26–34)
MCHC RBC AUTO-ENTMCNC: 32.4 G/DL (ref 30–36.5)
MCV RBC AUTO: 94.3 FL (ref 80–99)
METHADONE UR QL: NEGATIVE
MONOCYTES # BLD: 1 K/UL (ref 0–1)
MONOCYTES NFR BLD: 4 % (ref 5–13)
NEUTS SEG # BLD: 22.9 K/UL (ref 1.8–8)
NEUTS SEG NFR BLD: 92 % (ref 32–75)
NITRITE UR QL STRIP.AUTO: POSITIVE
NRBC # BLD: 0 K/UL (ref 0–0.01)
NRBC BLD-RTO: 0 PER 100 WBC
OPIATES UR QL: NEGATIVE
PCO2 BLD: 44.7 MMHG (ref 35–45)
PCP UR QL: NEGATIVE
PH BLD: 7.33 [PH] (ref 7.35–7.45)
PH UR STRIP: 5.5 [PH] (ref 5–8)
PLATELET # BLD AUTO: 286 K/UL (ref 150–400)
PMV BLD AUTO: 11.1 FL (ref 8.9–12.9)
PO2 BLD: 157 MMHG (ref 80–100)
POTASSIUM SERPL-SCNC: 3.3 MMOL/L (ref 3.5–5.1)
PROT SERPL-MCNC: 6.5 G/DL (ref 6.4–8.2)
PROT UR STRIP-MCNC: 100 MG/DL
RBC # BLD AUTO: 4.35 M/UL (ref 3.8–5.2)
RBC #/AREA URNS HPF: ABNORMAL /HPF (ref 0–5)
RBC MORPH BLD: ABNORMAL
SAMPLES BEING HELD,HOLD: NORMAL
SAMPLES BEING HELD,HOLD: NORMAL
SAO2 % BLD: 99 % (ref 92–97)
SERVICE CMNT-IMP: ABNORMAL
SODIUM SERPL-SCNC: 142 MMOL/L (ref 136–145)
SP GR UR REFRACTOMETRY: 1.02 (ref 1–1.03)
SPECIMEN TYPE: ABNORMAL
TROPONIN I SERPL-MCNC: <0.05 NG/ML
UR CULT HOLD, URHOLD: NORMAL
UROBILINOGEN UR QL STRIP.AUTO: 0.2 EU/DL (ref 0.2–1)
WBC # BLD AUTO: 24.8 K/UL (ref 3.6–11)
WBC URNS QL MICRO: >100 /HPF (ref 0–4)

## 2019-05-07 PROCEDURE — 83880 ASSAY OF NATRIURETIC PEPTIDE: CPT

## 2019-05-07 PROCEDURE — 65610000006 HC RM INTENSIVE CARE

## 2019-05-07 PROCEDURE — 87077 CULTURE AEROBIC IDENTIFY: CPT

## 2019-05-07 PROCEDURE — 70450 CT HEAD/BRAIN W/O DYE: CPT

## 2019-05-07 PROCEDURE — 80307 DRUG TEST PRSMV CHEM ANLYZR: CPT

## 2019-05-07 PROCEDURE — 77030011943

## 2019-05-07 PROCEDURE — 87205 SMEAR GRAM STAIN: CPT

## 2019-05-07 PROCEDURE — C1769 GUIDE WIRE: HCPCS

## 2019-05-07 PROCEDURE — 94640 AIRWAY INHALATION TREATMENT: CPT

## 2019-05-07 PROCEDURE — 82803 BLOOD GASES ANY COMBINATION: CPT

## 2019-05-07 PROCEDURE — 47490 INCISION OF GALLBLADDER: CPT

## 2019-05-07 PROCEDURE — 74011250636 HC RX REV CODE- 250/636: Performed by: RADIOLOGY

## 2019-05-07 PROCEDURE — 85025 COMPLETE CBC W/AUTO DIFF WBC: CPT

## 2019-05-07 PROCEDURE — 99285 EMERGENCY DEPT VISIT HI MDM: CPT

## 2019-05-07 PROCEDURE — 84484 ASSAY OF TROPONIN QUANT: CPT

## 2019-05-07 PROCEDURE — 87086 URINE CULTURE/COLONY COUNT: CPT

## 2019-05-07 PROCEDURE — 87186 SC STD MICRODIL/AGAR DIL: CPT

## 2019-05-07 PROCEDURE — 74176 CT ABD & PELVIS W/O CONTRAST: CPT

## 2019-05-07 PROCEDURE — 87075 CULTR BACTERIA EXCEPT BLOOD: CPT

## 2019-05-07 PROCEDURE — 36600 WITHDRAWAL OF ARTERIAL BLOOD: CPT

## 2019-05-07 PROCEDURE — 77030002996 HC SUT SLK J&J -A

## 2019-05-07 PROCEDURE — 74011000258 HC RX REV CODE- 258: Performed by: FAMILY MEDICINE

## 2019-05-07 PROCEDURE — 83605 ASSAY OF LACTIC ACID: CPT

## 2019-05-07 PROCEDURE — 74011250636 HC RX REV CODE- 250/636: Performed by: FAMILY MEDICINE

## 2019-05-07 PROCEDURE — 93005 ELECTROCARDIOGRAM TRACING: CPT

## 2019-05-07 PROCEDURE — 77030029684 HC NEB SM VOL KT MONA -A

## 2019-05-07 PROCEDURE — 96365 THER/PROPH/DIAG IV INF INIT: CPT

## 2019-05-07 PROCEDURE — 77030010548 HC BG WND DRN ARMD -B

## 2019-05-07 PROCEDURE — 82962 GLUCOSE BLOOD TEST: CPT

## 2019-05-07 PROCEDURE — 71045 X-RAY EXAM CHEST 1 VIEW: CPT

## 2019-05-07 PROCEDURE — C1729 CATH, DRAINAGE: HCPCS

## 2019-05-07 PROCEDURE — 83735 ASSAY OF MAGNESIUM: CPT

## 2019-05-07 PROCEDURE — 96361 HYDRATE IV INFUSION ADD-ON: CPT

## 2019-05-07 PROCEDURE — 80053 COMPREHEN METABOLIC PANEL: CPT

## 2019-05-07 PROCEDURE — 74011250636 HC RX REV CODE- 250/636: Performed by: EMERGENCY MEDICINE

## 2019-05-07 PROCEDURE — 36415 COLL VENOUS BLD VENIPUNCTURE: CPT

## 2019-05-07 PROCEDURE — 74011000258 HC RX REV CODE- 258: Performed by: EMERGENCY MEDICINE

## 2019-05-07 PROCEDURE — 0F9430Z DRAINAGE OF GALLBLADDER WITH DRAINAGE DEVICE, PERCUTANEOUS APPROACH: ICD-10-PCS | Performed by: RADIOLOGY

## 2019-05-07 PROCEDURE — 81001 URINALYSIS AUTO W/SCOPE: CPT

## 2019-05-07 PROCEDURE — 74011000250 HC RX REV CODE- 250: Performed by: FAMILY MEDICINE

## 2019-05-07 RX ORDER — MORPHINE SULFATE 2 MG/ML
2 INJECTION, SOLUTION INTRAMUSCULAR; INTRAVENOUS
Status: DISPENSED | OUTPATIENT
Start: 2019-05-07 | End: 2019-05-08

## 2019-05-07 RX ORDER — METRONIDAZOLE 500 MG/100ML
500 INJECTION, SOLUTION INTRAVENOUS EVERY 12 HOURS
Status: DISCONTINUED | OUTPATIENT
Start: 2019-05-07 | End: 2019-05-08

## 2019-05-07 RX ORDER — SODIUM CHLORIDE 9 MG/ML
75 INJECTION, SOLUTION INTRAVENOUS CONTINUOUS
Status: DISCONTINUED | OUTPATIENT
Start: 2019-05-07 | End: 2019-05-10

## 2019-05-07 RX ORDER — LEVOFLOXACIN 5 MG/ML
750 INJECTION, SOLUTION INTRAVENOUS
Status: DISCONTINUED | OUTPATIENT
Start: 2019-05-07 | End: 2019-05-07

## 2019-05-07 RX ORDER — ARFORMOTEROL TARTRATE 15 UG/2ML
15 SOLUTION RESPIRATORY (INHALATION)
Status: DISCONTINUED | OUTPATIENT
Start: 2019-05-07 | End: 2019-05-15 | Stop reason: HOSPADM

## 2019-05-07 RX ORDER — LIDOCAINE HYDROCHLORIDE 10 MG/ML
30 INJECTION, SOLUTION EPIDURAL; INFILTRATION; INTRACAUDAL; PERINEURAL ONCE
Status: COMPLETED | OUTPATIENT
Start: 2019-05-07 | End: 2019-05-07

## 2019-05-07 RX ORDER — SODIUM CHLORIDE 0.9 % (FLUSH) 0.9 %
5-40 SYRINGE (ML) INJECTION EVERY 8 HOURS
Status: DISCONTINUED | OUTPATIENT
Start: 2019-05-07 | End: 2019-05-15 | Stop reason: HOSPADM

## 2019-05-07 RX ORDER — BUDESONIDE 0.5 MG/2ML
500 INHALANT ORAL
Status: DISCONTINUED | OUTPATIENT
Start: 2019-05-07 | End: 2019-05-15 | Stop reason: HOSPADM

## 2019-05-07 RX ORDER — HEPARIN SODIUM 5000 [USP'U]/ML
5000 INJECTION, SOLUTION INTRAVENOUS; SUBCUTANEOUS EVERY 8 HOURS
Status: DISCONTINUED | OUTPATIENT
Start: 2019-05-07 | End: 2019-05-15 | Stop reason: HOSPADM

## 2019-05-07 RX ORDER — IPRATROPIUM BROMIDE AND ALBUTEROL SULFATE 2.5; .5 MG/3ML; MG/3ML
3 SOLUTION RESPIRATORY (INHALATION)
Status: DISCONTINUED | OUTPATIENT
Start: 2019-05-07 | End: 2019-05-08

## 2019-05-07 RX ORDER — ACETAMINOPHEN 650 MG/1
650 SUPPOSITORY RECTAL
Status: DISCONTINUED | OUTPATIENT
Start: 2019-05-07 | End: 2019-05-15 | Stop reason: HOSPADM

## 2019-05-07 RX ORDER — ETHAMBUTOL HYDROCHLORIDE 400 MG/1
800 TABLET, FILM COATED ORAL DAILY
Status: DISCONTINUED | OUTPATIENT
Start: 2019-05-08 | End: 2019-05-15 | Stop reason: HOSPADM

## 2019-05-07 RX ORDER — SODIUM CHLORIDE 0.9 % (FLUSH) 0.9 %
5-40 SYRINGE (ML) INJECTION AS NEEDED
Status: DISCONTINUED | OUTPATIENT
Start: 2019-05-07 | End: 2019-05-15 | Stop reason: HOSPADM

## 2019-05-07 RX ORDER — FENTANYL CITRATE 50 UG/ML
100 INJECTION, SOLUTION INTRAMUSCULAR; INTRAVENOUS
Status: DISCONTINUED | OUTPATIENT
Start: 2019-05-07 | End: 2019-05-07 | Stop reason: ALTCHOICE

## 2019-05-07 RX ADMIN — Medication 10 ML: at 21:57

## 2019-05-07 RX ADMIN — METRONIDAZOLE 500 MG: 500 INJECTION, SOLUTION INTRAVENOUS at 18:29

## 2019-05-07 RX ADMIN — FENTANYL CITRATE 25 MCG: 50 INJECTION, SOLUTION INTRAMUSCULAR; INTRAVENOUS at 16:39

## 2019-05-07 RX ADMIN — BUDESONIDE 500 MCG: 0.5 INHALANT RESPIRATORY (INHALATION) at 19:49

## 2019-05-07 RX ADMIN — SODIUM CHLORIDE 1000 ML: 900 INJECTION, SOLUTION INTRAVENOUS at 11:25

## 2019-05-07 RX ADMIN — Medication 10 ML: at 18:28

## 2019-05-07 RX ADMIN — SODIUM CHLORIDE 1000 ML: 900 INJECTION, SOLUTION INTRAVENOUS at 14:04

## 2019-05-07 RX ADMIN — PIPERACILLIN SODIUM,TAZOBACTAM SODIUM 3.38 G: 3; .375 INJECTION, POWDER, FOR SOLUTION INTRAVENOUS at 13:58

## 2019-05-07 RX ADMIN — LIDOCAINE HYDROCHLORIDE 9 ML: 10 INJECTION, SOLUTION EPIDURAL; INFILTRATION; INTRACAUDAL; PERINEURAL at 16:40

## 2019-05-07 RX ADMIN — DOXYCYCLINE 100 MG: 100 INJECTION, POWDER, LYOPHILIZED, FOR SOLUTION INTRAVENOUS at 20:41

## 2019-05-07 RX ADMIN — PIPERACILLIN AND TAZOBACTAM 3.38 G: 3; .375 INJECTION, POWDER, FOR SOLUTION INTRAVENOUS at 21:57

## 2019-05-07 RX ADMIN — SODIUM CHLORIDE 75 ML/HR: 900 INJECTION, SOLUTION INTRAVENOUS at 18:13

## 2019-05-07 RX ADMIN — IPRATROPIUM BROMIDE AND ALBUTEROL SULFATE 3 ML: .5; 3 SOLUTION RESPIRATORY (INHALATION) at 19:49

## 2019-05-07 RX ADMIN — HEPARIN SODIUM 5000 UNITS: 5000 INJECTION INTRAVENOUS; SUBCUTANEOUS at 18:29

## 2019-05-07 RX ADMIN — IPRATROPIUM BROMIDE AND ALBUTEROL SULFATE 3 ML: .5; 3 SOLUTION RESPIRATORY (INHALATION) at 23:02

## 2019-05-07 NOTE — ROUTINE PROCESS
TRANSFER - IN REPORT: 
 
Verbal report received from Mabel(name) on Shekhar Divine B Refo  being received from angio(unit) for routine progression of care Report consisted of patients Situation, Background, Assessment and  
Recommendations(SBAR). Information from the following report(s) SBAR was reviewed with the receiving nurse. Opportunity for questions and clarification was provided. 1800: Assessment completed upon patients arrival to unit and care assumed. Pt A&Ox1, agitated and combative. On 6L NC complaining of some abd. pain. 1830: Pt became combative when lab work was attempted. Will try again later.

## 2019-05-07 NOTE — ED TRIAGE NOTES
Pt comes in via EMS for c/c of unresponsiveness. Pt was last seen normal eating breakfast at 9:30am. At 10am staff found pt in recliner unresponsive and hypoxic. Pt usually A/O x 4 and ambulatory. Pt has DNR present upon arrival. , SpO2 96% on 15 L NRB.

## 2019-05-07 NOTE — PROCEDURES
Interventional Radiology  Procedure Note        5/7/2019 4:53 PM    Patient: Williams Ellis     Informed consent obtained    Diagnosis: possible cholecystitis     Procedure(s): Successful placement of 8 F cholecystostomy tube under US guidance. Keep to gravity drainage and flush daily with 5-10 cc NS.  Sample sent for culture     Specimens removed:  Above     Complications: None    Primary Physician: Marito Lemus MD    Recomendations: N/A    Discharge Disposition: floor     Full dictated report to follow    Signed By: Marito Lemus MD

## 2019-05-07 NOTE — PROGRESS NOTES
Clinical Pharmacy Note: Metronidazole Dosing Please note that the metronidazole dose for Darci Kumari has been changed to 500 mg IV q12h per [facility]-approved protocol. Please contact the pharmacy with any questions.  
 
Linwood Coburn, BRID

## 2019-05-07 NOTE — ROUTINE PROCESS
TRANSFER - IN REPORT: 
 
Verbal report received from Mabel(name) on Omaira GONSALEZ Refo  being received from angio(unit) for routine progression of care Report consisted of patients Situation, Background, Assessment and  
Recommendations(SBAR). Information from the following report(s) SBAR was reviewed with the receiving nurse. Opportunity for questions and clarification was provided. 1800: Assessment completed upon patients arrival to unit and care assumed. Pt A&Ox1, agitated and combative. On 6L NC complaining of some abd. pain. 1830: Pt became combative when lab work was attempted. Will try again later. Shift Summary: Pt is more calm, still confused. 0000: Bedside and Verbal shift change report given to Joy Tavera RN (oncoming nurse) by Lauren fung RN (offgoing nurse). Report included the following information SBAR, Kardex, Intake/Output, MAR, Recent Results, Cardiac Rhythm paced and Alarm Parameters .

## 2019-05-07 NOTE — CONSULTS
General Surgery ER Consultation    Admit Date: 5/7/2019  Reason for Consultation: cholecystitis vs liver abscess    HPI:  Surinder Barry is a 80 y.o. female w/ PMHx as outlined below presents to the ED via EMS from [facility] unresponsive. She ate her breakfast this am then went back to her room and was later found unresponsive in a chair. Her daughter who is at the bedside says she hasn't felt well the last 3 days. She c/o abd pain and hasn't been eating. She got \"dehydrated\" so went to health care and got IVF. She thinks urine was sent for to check for UTI. No f/c that she knows of. WBC 24.8, nl LFTs, t bili, UA likely UTI    CT scan  LIVER: No biliary dilatation. New 1.4 cm hypodensity in the right hepatic lobe  (image 25). A new 9.4 x 5.2 x 5 cm hypodensity in the inferior right hepatic  lobe (image 34). GALLBLADDER: Unremarkable.         Patient Active Problem List    Diagnosis Date Noted    Altered mental status 05/07/2019    S/P cardiac pacemaker procedure 12/13/2017    Complete heart block ([address] 12/12/2017    Healthcare-associated pneumonia 01/27/2016     Past Medical History:   Diagnosis Date    Bronchiectasis ([address]     Cancer ([address]     SCC    Dementia     Gastrointestinal disorder     benign tumor in colon 1982  /  h/o Celiac disease    GERD (gastroesophageal reflux disease)     Hx of colonic polyps     Hypertension     Kidney stones     Mastoiditis     Mycobacterium avium infection ([address]     Other ill-defined conditions(799.89)     microbacterium isidra- intracellular lung disease    PNA (pneumonia)     Vertigo       Past Surgical History:   Procedure Laterality Date    ABDOMEN SURGERY PROC UNLISTED      colon tumor removed 1982    HX COLECTOMY      HX GI  1980    PARTIAL COLON RESECTION    HX HEENT      AS A CHILD    HX UROLOGICAL      bladder sling 2007    [place] INS NEW/RPLCMT PRM PM W/TRANSV ELTRD ATRIAL&VENT  12/13/2017           Social History     Tobacco Use    Smoking status: Never Smoker    Smokeless tobacco: Never Used   Substance Use Topics    Alcohol use: No      Family History   Problem Relation Age of Onset    No Known Problems Daughter     No Known Problems Daughter     No Known Problems Daughter     No Known Problems Daughter     Anesth Problems Neg Hx       Prior to Admission medications    Medication Sig Start Date End Date Taking? Authorizing Provider   oxyCODONE-acetaminophen (PERCOCET) 5-325 mg per tablet Take 1 Tab by mouth every six (6) hours as needed for Pain. Max Daily Amount: 4 Tabs. 2/19/19   Yogesh Henderson MD   benzonatate (TESSALON) 200 mg capsule Take 200 mg by mouth three (3) times daily as needed for Cough. Provider, Historical   valsartan-hydroCHLOROthiazide (DIOVAN-HCT) 160-25 mg per tablet Take 1 Tab by mouth daily. Provider, Historical   azithromycin (ZITHROMAX) 250 mg tablet Take 250 mg by mouth daily. Provider, Historical   omeprazole (PRILOSEC) 20 mg capsule Take 20 mg by mouth daily. Provider, Historical   fluticasone-vilanterol (BREO ELLIPTA) 100-25 mcg/dose inhaler Take 1 Puff by inhalation daily. Provider, Historical   albuterol-ipratropium (DUO-NEB) 2.5 mg-0.5 mg/3 ml nebu 3 mL by Nebulization route every six (6) hours as needed. Provider, Historical   ethambutol (MYAMBUTOL) 400 mg tablet Take 800 mg by mouth daily. Indications: pulmonary myobacterial infection    Provider, Historical   Saccharomyces boulardii (FLORASTOR) 250 mg capsule Take 250 mg by mouth daily. Provider, Historical   loperamide (IMODIUM) 2 mg capsule Take 2 mg by mouth as needed for Diarrhea (after every loose stool, maximum of 6 caps/24 hours). Provider, Historical   meclizine (ANTIVERT) 12.5 mg tablet Take 12.5 mg by mouth nightly as needed. Indications: Vertigo    Provider, Historical   multivitamin (ONE A DAY) tablet Take 1 Tab by mouth daily.     Provider, Historical   guaiFENesin (ROBITUSSIN MUCUS-CHEST CONGEST) 100 mg/5 mL liquid Take 200 mg by mouth every four (4) hours as needed for Cough. Provider, Historical   peg 400-propylene glycol (SYSTANE, PROPYLENE GLYCOL,) 0.4-0.3 % drop 1 Drop three (3) times daily. Provider, Historical   cholecalciferol, vitamin D3, (VITAMIN D3) 2,000 unit tab Take 1 Tab by mouth daily. Provider, Historical   acetaminophen (TYLENOL) 325 mg tablet Take 2 Tabs by mouth every four (4) hours as needed. Patient taking differently: Take 650 mg by mouth every four (4) hours as needed for Pain. 1/27/16   Lindy Aldana MD     No Known Allergies       Subjective:     Review of Systems:    A comprehensive review of systems was negative except for that written in the History of Present Illness. Objective:     Blood pressure 148/65, pulse (!) 106, temperature (P) 98.9 °F (37.2 °C), resp. rate (!) 31, SpO2 95 %. Recent Results (from the past 24 hour(s))   GLUCOSE, POC    Collection Time: 05/07/19 10:57 AM   Result Value Ref Range    Glucose (POC) 183 (H) 65 - 100 mg/dL    Performed by Severa Clubs    EKG, 12 LEAD, INITIAL    Collection Time: 05/07/19 10:58 AM   Result Value Ref Range    Ventricular Rate 0 BPM    Atrial Rate 0 BPM    QRS Duration 0 ms    Q-T Interval 0 ms    QTC Calculation (Bezet) 0 ms    Calculated R Axis 0 degrees    Calculated T Axis 0 degrees    Diagnosis       ** No QRS complexes found, no ECG analysis possible **  When compared with ECG of 05-FEB-2019 11:17,  Current undetermined rhythm precludes rhythm comparison, needs review     SAMPLES BEING HELD    Collection Time: 05/07/19 11:13 AM   Result Value Ref Range    SAMPLES BEING HELD 1RD,1BL     COMMENT        Add-on orders for these samples will be processed based on acceptable specimen integrity and analyte stability, which may vary by analyte.    CBC WITH AUTOMATED DIFF    Collection Time: 05/07/19 11:13 AM   Result Value Ref Range    WBC 24.8 (H) 3.6 - 11.0 K/uL    RBC 4.35 3.80 - 5.20 M/uL    HGB 13.3 11.5 - 16.0 g/dL HCT 41.0 35.0 - 47.0 %    MCV 94.3 80.0 - 99.0 FL    MCH 30.6 26.0 - 34.0 PG    MCHC 32.4 30.0 - 36.5 g/dL    RDW 12.7 11.5 - 14.5 %    PLATELET 254 208 - 058 K/uL    MPV 11.1 8.9 - 12.9 FL    NRBC 0.0 0  WBC    ABSOLUTE NRBC 0.00 0.00 - 0.01 K/uL    NEUTROPHILS 92 (H) 32 - 75 %    LYMPHOCYTES 3 (L) 12 - 49 %    MONOCYTES 4 (L) 5 - 13 %    EOSINOPHILS 0 0 - 7 %    BASOPHILS 0 0 - 1 %    IMMATURE GRANULOCYTES 1 (H) 0.0 - 0.5 %    ABS. NEUTROPHILS 22.9 (H) 1.8 - 8.0 K/UL    ABS. LYMPHOCYTES 0.7 (L) 0.8 - 3.5 K/UL    ABS. MONOCYTES 1.0 0.0 - 1.0 K/UL    ABS. EOSINOPHILS 0.0 0.0 - 0.4 K/UL    ABS. BASOPHILS 0.0 0.0 - 0.1 K/UL    ABS. IMM. GRANS. 0.2 (H) 0.00 - 0.04 K/UL    DF SMEAR SCANNED      RBC COMMENTS NORMOCYTIC, NORMOCHROMIC     METABOLIC PANEL, COMPREHENSIVE    Collection Time: 05/07/19 11:13 AM   Result Value Ref Range    Sodium 142 136 - 145 mmol/L    Potassium 3.3 (L) 3.5 - 5.1 mmol/L    Chloride 109 (H) 97 - 108 mmol/L    CO2 23 21 - 32 mmol/L    Anion gap 10 5 - 15 mmol/L    Glucose 179 (H) 65 - 100 mg/dL    BUN 39 (H) 6 - 20 MG/DL    Creatinine 1.10 (H) 0.55 - 1.02 MG/DL    BUN/Creatinine ratio 35 (H) 12 - 20      GFR est AA 56 (L) >60 ml/min/1.73m2    GFR est non-AA 46 (L) >60 ml/min/1.73m2    Calcium 9.1 8.5 - 10.1 MG/DL    Bilirubin, total 0.6 0.2 - 1.0 MG/DL    ALT (SGPT) 33 12 - 78 U/L    AST (SGOT) 38 (H) 15 - 37 U/L    Alk.  phosphatase 67 45 - 117 U/L    Protein, total 6.5 6.4 - 8.2 g/dL    Albumin 2.7 (L) 3.5 - 5.0 g/dL    Globulin 3.8 2.0 - 4.0 g/dL    A-G Ratio 0.7 (L) 1.1 - 2.2     TROPONIN I    Collection Time: 05/07/19 11:13 AM   Result Value Ref Range    Troponin-I, Qt. <0.05 <0.05 ng/mL   NT-PRO BNP    Collection Time: 05/07/19 11:13 AM   Result Value Ref Range    NT pro- (H) <450 PG/ML   MAGNESIUM    Collection Time: 05/07/19 11:13 AM   Result Value Ref Range    Magnesium 2.3 1.6 - 2.4 mg/dL   LACTIC ACID    Collection Time: 05/07/19 11:13 AM   Result Value Ref Range Lactic acid 2.0 0.4 - 2.0 MMOL/L   POC G3 - PUL    Collection Time: 05/07/19 11:20 AM   Result Value Ref Range    pH (POC) 7.329 (L) 7.35 - 7.45      pCO2 (POC) 44.7 35.0 - 45.0 MMHG    pO2 (POC) 157 (H) 80 - 100 MMHG    HCO3 (POC) 23.5 22 - 26 MMOL/L    sO2 (POC) 99 (H) 92 - 97 %    Base deficit (POC) 2 mmol/L    Site LEFT BRACHIAL      Device: Non rebreather      Flow rate (POC) 15 L/M    Allens test (POC) N/A      Specimen type (POC) ARTERIAL     URINALYSIS W/MICROSCOPIC    Collection Time: 05/07/19 11:42 AM   Result Value Ref Range    Color YELLOW/STRAW      Appearance TURBID (A) CLEAR      Specific gravity 1.021 1.003 - 1.030      pH (UA) 5.5 5.0 - 8.0      Protein 100 (A) NEG mg/dL    Glucose NEGATIVE  NEG mg/dL    Ketone NEGATIVE  NEG mg/dL    Bilirubin NEGATIVE  NEG      Blood MODERATE (A) NEG      Urobilinogen 0.2 0.2 - 1.0 EU/dL    Nitrites POSITIVE (A) NEG      Leukocyte Esterase LARGE (A) NEG      WBC >100 (H) 0 - 4 /hpf    RBC 5-10 0 - 5 /hpf    Epithelial cells FEW FEW /lpf    Bacteria 2+ (A) NEG /hpf    Granular cast 5-10 (A) NEG /lpf   URINE CULTURE HOLD SAMPLE    Collection Time: 05/07/19 11:42 AM   Result Value Ref Range    Urine culture hold        URINE ON HOLD IN MICROBIOLOGY DEPT FOR 3 DAYS. IF UNPRESERVED URINE IS SUBMITTED, IT CANNOT BE USED FOR ADDITIONAL TESTING AFTER 24 HRS, RECOLLECTION WILL BE REQUIRED.     _____________________  Physical Exam:     General:  Awakens when name called; mumbles some answers; on NRB mask   Eyes:   Sclera clear. Throat: Lips, mucosa, and tongue are dry   Neck: Supple, symmetrical, trachea midline. Lungs:   Some wheezing heard anteriorally   Heart:  Regular rate and rhythm; has a PPM in L anterior chest   Abdomen:   Soft, very tender in mid abdomen. Bowel sounds normal. No masses,  No organomegaly. Extremities: Extremities normal, atraumatic, no cyanosis or edema. Skin: Skin color, texture, turgor normal. No rashes or lesions.            Assessment: Active Problems:    Altered mental status (5/7/2019)            Plan: Will have Dr Harding Back evaluate CT scan  IVF  abx  Await urine ctx  Admit per medicine team  Will likely need IR drainage of the large liver abscess    Total time spent with patient: 25 minutes. Signed By: Jeffrey Valdez NP     May 7, 2019        I have independently examined the patient and have reviewed the chart. I agree with the above plan. Patient is a very poor historian but was found down this morning at [facility]. She has had a couple of days of abdominal pain and n/v per her daughters. She has a history of MAC and has a chronic cough. She was brought to the ER and found to have significant leukocytosis. She was thought to have a likely pneumonia and UTI. CT abdomen obtained due to abdominal symptoms which showed 2 possible liver abscesses. She has subsequently had a cholecystostomy tube placed by IR. She is more alert now but remains confused. Afeb currently, VSS. RRR, CTAB, abd soft, TTP in RUQ. Labs and imaging reviewed. I think the larger abscess in the liver may have been her gallbladder, but this is hard to discern on non-contrast CT. Agree with plans for IR drainage/cholecystostomy tube and antibiotics. I will start her on clear liquids for tonight.   Management otherwise per primary team.      Gayatri Nash MD  5/7/2019  7:03 PM

## 2019-05-07 NOTE — PROGRESS NOTES
TRANSFER - OUT REPORT: 
 
Verbal report given to Langston Saint, RN(name) on Lizabeth Lewis  being transferred to ICU 1 
(unit) for routine progression of care Report consisted of patients Situation, Background, Assessment and  
Recommendations(SBAR). Information from the following report(s) SBAR, Procedure Summary and MAR was reviewed with the receiving nurse. Lines:  
Peripheral IV 05/07/19 Left Forearm (Active) Peripheral IV 05/07/19 Right Antecubital (Active) Site Assessment Clean, dry, & intact 5/7/2019 11:55 AM  
  
 
Opportunity for questions and clarification was provided. Patient transported with: NRBM @ [facility]

## 2019-05-07 NOTE — ED PROVIDER NOTES
80 y.o. female with past medical history significant for hypertension, GERD, pneumonia, and COPD who presents from [facility] via EMS with chief complaint of being unresponsive. EMS reports pt was last seen at baseline this morning while at breakfast in the dining bee around 0930. EMS reports pt was taken back to her room and upon checking up on her at approximately 1000, pt was found unresponsive in her recliner by staff. EMS reports pt's blood pressure on their arrival was 170/90 and their last blood pressure was 154/60. EMS reports pt was initially hypoxic with SpO2 of 64% on room air, which improved to 92% with nasal trumpet and O2 mask. EMS reports pt started Rocephin last night for a UTI and there was concern that she may be having an allergic reaction and gave the pt Benadryl en route. EMS notes that pt's grunting noises lessened after receiving Benadryl. EMS reports pt is able to ambulate and ANOx4 at baseline. EMS reports the pt has DNR. Daughter reports the pt has been sick recently and was moved from her living arrangement to [facility] on 5/4/19. Daughter notes the pt had been complaining of abdominal pain and is concerned about possible constipation. Full history, physical exam, and ROS unable to be obtained due to:  unresponsive. Social hx - Tobacco use: none, Alcohol Use: none PCP: Power Oswald MD 
 
Note written by Ander Chicas, as dictated by Patricia Escobedo MD 10:40 AM. The history is provided by the EMS personnel, a caregiver and a relative. The history is limited by the condition of the patient. No  was used. Past Medical History:  
Diagnosis Date  Bronchiectasis ([address]  Cancer ([address] SCC  Dementia  Gastrointestinal disorder   
 benign tumor in colon 1982  /  h/o Celiac disease  GERD (gastroesophageal reflux disease)  Hx of colonic polyps  Hypertension  Kidney stones  Mastoiditis  Mycobacterium avium infection ([facility]  Other ill-defined conditions(799.89)   
 microbacterium isidra- intracellular lung disease  PNA (pneumonia)  Vertigo Past Surgical History:  
Procedure Laterality Date  ABDOMEN SURGERY PROC UNLISTED    
 colon tumor removed 1982  HX COLECTOMY [address] PARTIAL COLON RESECTION  
 HX HEENT    
 AS A CHILD  
 HX UROLOGICAL    
 bladder sling 2007  IN INS NEW/RPLCMT PRM PM W/TRANSV ELTRD ATRIAL&VENT  12/13/2017 Family History:  
Problem Relation Age of Onset  No Known Problems Daughter  No Known Problems Daughter  No Known Problems Daughter  No Known Problems Daughter  Anesth Problems Neg Hx Social History Socioeconomic History  Marital status:  Spouse name: Not on file  Number of children: Not on file  Years of education: Not on file  Highest education level: Not on file Occupational History  Not on file Social Needs  Financial resource strain: Not on file  Food insecurity:  
  Worry: Not on file Inability: Not on file  Transportation needs:  
  Medical: Not on file Non-medical: Not on file Tobacco Use  Smoking status: Never Smoker  Smokeless tobacco: Never Used Substance and Sexual Activity  Alcohol use: No  
 Drug use: No  
 Sexual activity: Not on file Lifestyle  Physical activity:  
  Days per week: Not on file Minutes per session: Not on file  Stress: Not on file Relationships  Social connections:  
  Talks on phone: Not on file Gets together: Not on file Attends Judaism service: Not on file Active member of club or organization: Not on file Attends meetings of clubs or organizations: Not on file Relationship status: Not on file  Intimate partner violence:  
  Fear of current or ex partner: Not on file Emotionally abused: Not on file Physically abused: Not on file Forced sexual activity: Not on file Other Topics Concern  Not on file Social History Narrative  Not on file ALLERGIES: Patient has no known allergies. Review of Systems Unable to perform ROS: Patient unresponsive Vitals:  
 05/07/19 1057 BP: 148/65 Pulse: (!) 106 Resp: (!) 31 SpO2: 95% Physical Exam  
Constitutional: No distress. Responsive to painful stimuli. HENT:  
Head: Normocephalic and atraumatic. Eyes:  
Pupils 4 mm and reactive bilaterally. Neck: Normal range of motion. Neck supple. Cardiovascular: Regular rhythm and normal heart sounds. Tachycardia present. Exam reveals no gallop and no friction rub. No murmur heard. Pulmonary/Chest: Effort normal and breath sounds normal. Tachypnea noted. No respiratory distress. She has no wheezes. Clear breath sounds. Abdominal: Soft. Bowel sounds are normal. She exhibits no distension. There is no tenderness. There is no rebound and no guarding. Musculoskeletal: Normal range of motion. Skin: Skin is warm. No rash noted. She is not diaphoretic. Nursing note and vitals reviewed. Note written by Ander Kaur, as dictated by Hiram Maldonado MD 10:40 AM. MDM This is a 55-year-old female, with past medical history, review of systems, physical exam as above, presenting EMS for complaints of unconscious, unresponsive. Per report, patient seen eating breakfast in the cafeteria approximately 30 minutes before being found unconscious unresponsive. Per EMS, patient hypoxic on room air, improving with high flow oxygen by mask, upon arrival, unconscious, localizing to pain, with equal reactive pupils, clear breath sounds, soft abdomen, noted to be tachycardic and tachypnea. Daughter at bedside states recently treated for infection with IV antibiotics, in the setting of a history of COPD, daughter notes she is DNR/DNI.  Plan to proceed with noninvasive resuscitation, including IV fluids, flow oxygen, obtain CMP, CBC, EKG, chest x-ray, cardiac enzymes, head CT. We will reevaluate, and make a disposition, however the patient is likely to require admission for further care and evaluation. Procedures PROGRESS NOTE: 
11:45 AM 
Pt combative during cronin placement, not oriented. ED EKG interpretation: 
Rhythm: A-sensed V-paced rhythm; and regular . Rate (approx.): 115 bpm; long QT, no acute abnormality Note written by Ander Magallanes, as dictated by Alex Bell MD 12:01 PM. 2:17 PM 
Patient discussed with Dr. Kaila Choi, he will admit

## 2019-05-07 NOTE — ED NOTES
Pt alert, restless, and talking to staff. Combative towards staff. MD notified. Pt remains on NRB 15L.

## 2019-05-08 LAB
ANION GAP SERPL CALC-SCNC: 4 MMOL/L (ref 5–15)
ATRIAL RATE: 113 BPM
BASOPHILS # BLD: 0 K/UL (ref 0–0.1)
BASOPHILS NFR BLD: 0 % (ref 0–1)
BUN SERPL-MCNC: 33 MG/DL (ref 6–20)
BUN/CREAT SERPL: 36 (ref 12–20)
CALCIUM SERPL-MCNC: 8.4 MG/DL (ref 8.5–10.1)
CALCULATED R AXIS, ECG10: -94 DEGREES
CALCULATED T AXIS, ECG11: 62 DEGREES
CHLORIDE SERPL-SCNC: 114 MMOL/L (ref 97–108)
CO2 SERPL-SCNC: 26 MMOL/L (ref 21–32)
CREAT SERPL-MCNC: 0.91 MG/DL (ref 0.55–1.02)
DIAGNOSIS, 93000: NORMAL
DIFFERENTIAL METHOD BLD: ABNORMAL
EOSINOPHIL # BLD: 0 K/UL (ref 0–0.4)
EOSINOPHIL NFR BLD: 0 % (ref 0–7)
ERYTHROCYTE [DISTWIDTH] IN BLOOD BY AUTOMATED COUNT: 12.9 % (ref 11.5–14.5)
GLUCOSE SERPL-MCNC: 84 MG/DL (ref 65–100)
HCT VFR BLD AUTO: 34.7 % (ref 35–47)
HGB BLD-MCNC: 10.6 G/DL (ref 11.5–16)
IMM GRANULOCYTES # BLD AUTO: 0.1 K/UL (ref 0–0.04)
IMM GRANULOCYTES NFR BLD AUTO: 1 % (ref 0–0.5)
LACTATE SERPL-SCNC: 1.1 MMOL/L (ref 0.4–2)
LYMPHOCYTES # BLD: 0.8 K/UL (ref 0.8–3.5)
LYMPHOCYTES NFR BLD: 6 % (ref 12–49)
MCH RBC QN AUTO: 29.6 PG (ref 26–34)
MCHC RBC AUTO-ENTMCNC: 30.5 G/DL (ref 30–36.5)
MCV RBC AUTO: 96.9 FL (ref 80–99)
MONOCYTES # BLD: 0.8 K/UL (ref 0–1)
MONOCYTES NFR BLD: 6 % (ref 5–13)
NEUTS SEG # BLD: 11 K/UL (ref 1.8–8)
NEUTS SEG NFR BLD: 87 % (ref 32–75)
NRBC # BLD: 0 K/UL (ref 0–0.01)
NRBC BLD-RTO: 0 PER 100 WBC
P-R INTERVAL, ECG05: 200 MS
PLATELET # BLD AUTO: 215 K/UL (ref 150–400)
PMV BLD AUTO: 10.9 FL (ref 8.9–12.9)
POTASSIUM SERPL-SCNC: 2.9 MMOL/L (ref 3.5–5.1)
Q-T INTERVAL, ECG07: 406 MS
QRS DURATION, ECG06: 154 MS
QTC CALCULATION (BEZET), ECG08: 556 MS
RBC # BLD AUTO: 3.58 M/UL (ref 3.8–5.2)
RBC MORPH BLD: ABNORMAL
SODIUM SERPL-SCNC: 144 MMOL/L (ref 136–145)
VENTRICULAR RATE, ECG03: 113 BPM
WBC # BLD AUTO: 12.7 K/UL (ref 3.6–11)

## 2019-05-08 PROCEDURE — 74011250636 HC RX REV CODE- 250/636: Performed by: INTERNAL MEDICINE

## 2019-05-08 PROCEDURE — 74011000250 HC RX REV CODE- 250: Performed by: FAMILY MEDICINE

## 2019-05-08 PROCEDURE — 94640 AIRWAY INHALATION TREATMENT: CPT

## 2019-05-08 PROCEDURE — 74011000258 HC RX REV CODE- 258: Performed by: FAMILY MEDICINE

## 2019-05-08 PROCEDURE — 80048 BASIC METABOLIC PNL TOTAL CA: CPT

## 2019-05-08 PROCEDURE — 65660000000 HC RM CCU STEPDOWN

## 2019-05-08 PROCEDURE — 74011250637 HC RX REV CODE- 250/637: Performed by: INTERNAL MEDICINE

## 2019-05-08 PROCEDURE — 74011000258 HC RX REV CODE- 258: Performed by: INTERNAL MEDICINE

## 2019-05-08 PROCEDURE — 74011250637 HC RX REV CODE- 250/637: Performed by: FAMILY MEDICINE

## 2019-05-08 PROCEDURE — 94760 N-INVAS EAR/PLS OXIMETRY 1: CPT

## 2019-05-08 PROCEDURE — 85025 COMPLETE CBC W/AUTO DIFF WBC: CPT

## 2019-05-08 PROCEDURE — 77010033678 HC OXYGEN DAILY

## 2019-05-08 PROCEDURE — 74011000250 HC RX REV CODE- 250: Performed by: INTERNAL MEDICINE

## 2019-05-08 PROCEDURE — 74011250636 HC RX REV CODE- 250/636: Performed by: FAMILY MEDICINE

## 2019-05-08 PROCEDURE — 36415 COLL VENOUS BLD VENIPUNCTURE: CPT

## 2019-05-08 PROCEDURE — 83605 ASSAY OF LACTIC ACID: CPT

## 2019-05-08 RX ORDER — POTASSIUM CHLORIDE 7.45 MG/ML
10 INJECTION INTRAVENOUS
Status: DISPENSED | OUTPATIENT
Start: 2019-05-08 | End: 2019-05-08

## 2019-05-08 RX ORDER — AZITHROMYCIN 250 MG/1
250 TABLET, FILM COATED ORAL DAILY
Status: DISCONTINUED | OUTPATIENT
Start: 2019-05-08 | End: 2019-05-15 | Stop reason: HOSPADM

## 2019-05-08 RX ORDER — IPRATROPIUM BROMIDE AND ALBUTEROL SULFATE 2.5; .5 MG/3ML; MG/3ML
3 SOLUTION RESPIRATORY (INHALATION)
Status: DISCONTINUED | OUTPATIENT
Start: 2019-05-09 | End: 2019-05-09

## 2019-05-08 RX ORDER — PANTOPRAZOLE SODIUM 40 MG/1
40 TABLET, DELAYED RELEASE ORAL
Status: DISCONTINUED | OUTPATIENT
Start: 2019-05-08 | End: 2019-05-15 | Stop reason: HOSPADM

## 2019-05-08 RX ADMIN — HEPARIN SODIUM 5000 UNITS: 5000 INJECTION INTRAVENOUS; SUBCUTANEOUS at 11:12

## 2019-05-08 RX ADMIN — POTASSIUM CHLORIDE 10 MEQ: 10 INJECTION, SOLUTION INTRAVENOUS at 11:01

## 2019-05-08 RX ADMIN — HEPARIN SODIUM 5000 UNITS: 5000 INJECTION INTRAVENOUS; SUBCUTANEOUS at 18:07

## 2019-05-08 RX ADMIN — Medication 10 ML: at 06:33

## 2019-05-08 RX ADMIN — POTASSIUM CHLORIDE 10 MEQ: 10 INJECTION, SOLUTION INTRAVENOUS at 13:00

## 2019-05-08 RX ADMIN — PIPERACILLIN AND TAZOBACTAM 3.38 G: 3; .375 INJECTION, POWDER, FOR SOLUTION INTRAVENOUS at 06:33

## 2019-05-08 RX ADMIN — Medication 10 ML: at 13:53

## 2019-05-08 RX ADMIN — PANTOPRAZOLE SODIUM 40 MG: 40 TABLET, DELAYED RELEASE ORAL at 13:04

## 2019-05-08 RX ADMIN — IPRATROPIUM BROMIDE AND ALBUTEROL SULFATE 3 ML: .5; 3 SOLUTION RESPIRATORY (INHALATION) at 20:39

## 2019-05-08 RX ADMIN — Medication 1 CAPSULE: at 08:27

## 2019-05-08 RX ADMIN — DOXYCYCLINE 100 MG: 100 INJECTION, POWDER, LYOPHILIZED, FOR SOLUTION INTRAVENOUS at 08:29

## 2019-05-08 RX ADMIN — PIPERACILLIN AND TAZOBACTAM 3.38 G: 3; .375 INJECTION, POWDER, FOR SOLUTION INTRAVENOUS at 14:38

## 2019-05-08 RX ADMIN — IPRATROPIUM BROMIDE AND ALBUTEROL SULFATE 3 ML: .5; 3 SOLUTION RESPIRATORY (INHALATION) at 09:00

## 2019-05-08 RX ADMIN — POTASSIUM CHLORIDE 10 MEQ: 10 INJECTION, SOLUTION INTRAVENOUS at 12:55

## 2019-05-08 RX ADMIN — PIPERACILLIN AND TAZOBACTAM 3.38 G: 3; .375 INJECTION, POWDER, FOR SOLUTION INTRAVENOUS at 22:22

## 2019-05-08 RX ADMIN — ACETAMINOPHEN 650 MG: 650 SUPPOSITORY RECTAL at 00:08

## 2019-05-08 RX ADMIN — SODIUM CHLORIDE 75 ML/HR: 900 INJECTION, SOLUTION INTRAVENOUS at 06:37

## 2019-05-08 RX ADMIN — AZITHROMYCIN 250 MG: 250 TABLET, FILM COATED ORAL at 13:04

## 2019-05-08 RX ADMIN — Medication 10 ML: at 22:23

## 2019-05-08 RX ADMIN — POTASSIUM CHLORIDE 10 MEQ: 10 INJECTION, SOLUTION INTRAVENOUS at 13:51

## 2019-05-08 RX ADMIN — IPRATROPIUM BROMIDE AND ALBUTEROL SULFATE 3 ML: .5; 3 SOLUTION RESPIRATORY (INHALATION) at 12:43

## 2019-05-08 RX ADMIN — BUDESONIDE 500 MCG: 0.5 INHALANT RESPIRATORY (INHALATION) at 20:39

## 2019-05-08 RX ADMIN — METRONIDAZOLE 500 MG: 500 INJECTION, SOLUTION INTRAVENOUS at 06:34

## 2019-05-08 RX ADMIN — BUDESONIDE 500 MCG: 0.5 INHALANT RESPIRATORY (INHALATION) at 09:00

## 2019-05-08 NOTE — PROGRESS NOTES
Hospitalist Progress Note [address] [facility] Answering service: [phone] OR [phone] from in house phone Date of Service:  2019 NAME:  Reji Espinoza :  1924 MRN:  [id number] Admission Summary:  
80year old female presenting with altered mental status and found to have concern for cholecystitis. Interval history / Subjective:  
Patient seen and examined. Pleasantly confused. Appears overall improved. Preliminary cultures from drain negative, will continue antibiotics. Assessment & Plan:  
 
Sepsis, secondary to cholecystitis with abscess:  
-s/p IR cholecystotomy tube 
-General surgery following, will need to keep in place for couple weeks 
-continue zosyn for now, follow final cultures Acute hypoxic respiratory failure:  
-wean oxygen Pneumonia: continue antibiotics as above Possible UTI: follow final cultures Hypokalemia: replace as needed History of MAC: continue home ethambutol, azithromycin Dementia: supportive care Code status: dnr 
DVT prophylaxis: heparin Care Plan discussed with: Patient/Family Disposition: TBD Hospital Problems  Date Reviewed: 2019 Codes Class Noted POA Altered mental status ICD-10-CM: R41.82 
ICD-9-CM: 780.97  2019 Unknown Review of Systems:  
Unable to obtain accurate ROS Vital Signs:  
 Last 24hrs VS reviewed since prior progress note. Most recent are: 
Visit Vitals /78 Pulse 88 Temp 98 °F (36.7 °C) Resp 17 Ht 5' 3.74\" (1.619 m) Wt 65.2 kg (143 lb 11.8 oz) SpO2 97% BMI 24.87 kg/m² Intake/Output Summary (Last 24 hours) at 2019 1844 Last data filed at 2019 1057 Gross per 24 hour Intake 2075 ml Output 305 ml Net 1770 ml Physical Examination:  
 
 
     
Constitutional:  No acute distress, cooperative, pleasant   
ENT:  Oral mucous moist, oropharynx benign.  Neck supple,   
 Resp: CTA bilaterally. No wheezing/rhonchi/rales. No accessory muscle use CV:  Regular rhythm, normal rate, no murmurs, gallops, rubs GI:  right cholecystomy tube in place, Soft, non distended, non tender. normoactive bowel sounds Musculoskeletal:  No edema, warm, 2+ pulses throughout Neurologic:  Moves all extremities. Data Review:  
 Review and/or order of clinical lab test 
 
 
Labs:  
 
Recent Labs 05/08/19 
9277 05/07/19 
1113 WBC 12.7* 24.8* HGB 10.6* 13.3 HCT 34.7* 41.0  
 286 Recent Labs 05/08/19 
5602 05/07/19 
1113  142  
K 2.9* 3.3*  
* 109* CO2 26 23 BUN 33* 39* CREA 0.91 1.10* GLU 84 179* CA 8.4* 9.1 MG  --  2.3 Recent Labs 05/07/19 
1113 SGOT 38* ALT 33 AP 67 TBILI 0.6 TP 6.5 ALB 2.7*  
GLOB 3.8 No results for input(s): INR, PTP, APTT in the last 72 hours. No lab exists for component: INREXT No results for input(s): FE, TIBC, PSAT, FERR in the last 72 hours. No results found for: FOL, RBCF No results for input(s): PH, PCO2, PO2 in the last 72 hours. Recent Labs 05/07/19 
1113 TROIQ <0.05 Lab Results Component Value Date/Time Cholesterol, total 208 (H) 05/11/2009 12:15 PM  
 HDL Cholesterol 90 (H) 05/11/2009 12:15 PM  
 LDL, calculated 107.6 (H) 05/11/2009 12:15 PM  
 Triglyceride 52 05/11/2009 12:15 PM  
 CHOL/HDL Ratio 2.3 05/11/2009 12:15 PM  
 
Lab Results Component Value Date/Time Glucose (POC) 183 (H) 05/07/2019 10:57 AM  
 
Lab Results Component Value Date/Time  Color YELLOW/STRAW 05/07/2019 11:42 AM  
 Appearance TURBID (A) 05/07/2019 11:42 AM  
 Specific gravity 1.021 05/07/2019 11:42 AM  
 pH (UA) 5.5 05/07/2019 11:42 AM  
 Protein 100 (A) 05/07/2019 11:42 AM  
 Glucose NEGATIVE  05/07/2019 11:42 AM  
 Ketone NEGATIVE  05/07/2019 11:42 AM  
 Bilirubin NEGATIVE  05/07/2019 11:42 AM  
 Urobilinogen 0.2 05/07/2019 11:42 AM  
 Nitrites POSITIVE (A) 05/07/2019 11:42 AM  
 Leukocyte Esterase LARGE (A) 05/07/2019 11:42 AM  
 Epithelial cells FEW 05/07/2019 11:42 AM  
 Bacteria 2+ (A) 05/07/2019 11:42 AM  
 WBC >100 (H) 05/07/2019 11:42 AM  
 RBC 5-10 05/07/2019 11:42 AM  
 
 
 
Medications Reviewed:  
 
Current Facility-Administered Medications Medication Dose Route Frequency  azithromycin (ZITHROMAX) tablet 250 mg  250 mg Oral DAILY  pantoprazole (PROTONIX) tablet 40 mg  40 mg Oral ACB  ethambutol (MYAMBUTOL) tablet 800 mg  800 mg Oral DAILY  lactobac ac& pc-s.therm-b.anim (MANI Q/RISAQUAD)  1 Cap Oral DAILY  sodium chloride (NS) flush 5-40 mL  5-40 mL IntraVENous Q8H  
 sodium chloride (NS) flush 5-40 mL  5-40 mL IntraVENous PRN  
 0.9% sodium chloride infusion  75 mL/hr IntraVENous CONTINUOUS  
 acetaminophen (TYLENOL) suppository 650 mg  650 mg Rectal Q4H PRN  
 heparin (porcine) injection 5,000 Units  5,000 Units SubCUTAneous Q8H  piperacillin-tazobactam (ZOSYN) 3.375 g in 0.9% sodium chloride (MBP/ADV) 100 mL  3.375 g IntraVENous Q8H  
 albuterol-ipratropium (DUO-NEB) 2.5 MG-0.5 MG/3 ML  3 mL Nebulization Q4H RT  
 arformoterol (BROVANA) neb solution 15 mcg  15 mcg Nebulization BID RT And  
 budesonide (PULMICORT) 500 mcg/2 ml nebulizer suspension  500 mcg Nebulization BID RT  
 
______________________________________________________________________ EXPECTED LENGTH OF STAY: 4d 19h ACTUAL LENGTH OF STAY:          1 [address] [facility],

## 2019-05-08 NOTE — PROGRESS NOTES
Care Management Interventions PCP Verified by CM: Yes(Dr Thibodeaux at [facility]) Palliative Care Criteria Met (RRAT>21 & CHF Dx)?: No 
Mode of Transport at Discharge: (tbd most likely stretcher ) Transition of Care Consult (CM Consult): Long Term Care(healthcare at [address] [place] [address]) MyChart Signup: No 
Discharge Durable Medical Equipment: No 
Physical Therapy Consult: No 
Occupational Therapy Consult: No 
Speech Therapy Consult: No 
Confirm Follow Up Transport: (tbd stretcher) [employer] Information Provided?: No 
Discharge Location Discharge Placement: Skilled nursing facility  met with patient's daughters who were at the bedside. I also spoke with [facility]  at [facility] phone [phone]. Patient has been in healthcare for the last few months per [facility] and the plan is for her to return to healthcare. Patient did have an AMD on file at [facility]. Patient is alert this am but disoriented x2. I gave an sbar to Den Paez  as patient was transferred to room 508.

## 2019-05-08 NOTE — PROGRESS NOTES
0800 received verbal bedside report from [employer]- alarms checked - Dr Frances Graf in and updated  
0900 2 new IV's placed- pt family member in 
TRANSFER - OUT REPORT: 
 
Verbal report given to kelin mendes(name) on Omaira GONSALEZ Refo  being transferred to 5 e(unit) for routine progression of care Report consisted of patients Situation, Background, Assessment and  
Recommendations(SBAR). Information from the following report(s) SBAR, Kardex, Procedure Summary, Recent Results and Cardiac Rhythm paced was reviewed with the receiving nurse. Lines:  
Peripheral IV 05/08/19 Right Forearm (Active) Site Assessment Clean, dry, & intact 5/8/2019  8:47 AM  
Phlebitis Assessment 0 5/8/2019  8:47 AM  
Infiltration Assessment 0 5/8/2019  8:47 AM  
Dressing Status Clean, dry, & intact; New 5/8/2019  8:47 AM  
Dressing Type Transparent;Tape 5/8/2019  8:47 AM  
Hub Color/Line Status Blue;Capped;Flushed 5/8/2019  8:47 AM  
Action Taken Open ports on tubing capped 5/8/2019  8:47 AM  
Alcohol Cap Used Yes 5/8/2019  8:47 AM  
   
Peripheral IV 05/08/19 Left;Mid Arm (Active) Site Assessment Clean, dry, & intact 5/8/2019  9:02 AM  
Phlebitis Assessment 0 5/8/2019  9:02 AM  
Infiltration Assessment 0 5/8/2019  9:02 AM  
Dressing Status Clean, dry, & intact 5/8/2019  9:02 AM  
Dressing Type Transparent 5/8/2019  9:02 AM  
Hub Color/Line Status Blue 5/8/2019  9:02 AM  
Action Taken Open ports on tubing capped 5/8/2019  9:02 AM  
Alcohol Cap Used Yes 5/8/2019  9:02 AM  
  
 
Opportunity for questions and clarification was provided. Patient transported with: 
 O2 @ 2 liters Registered Nurse Tech

## 2019-05-08 NOTE — PROGRESS NOTES
PCCM 
 
Admitted to ICU for observation Hemodynamically stable and on minimal O2 
K replaced Stable for transfer out of ICU Genoveva Garcia MD

## 2019-05-08 NOTE — PROGRESS NOTES
[facility] Subjective Patient feeling better today but she remains a very poor historian. Does not remember much about yesterday. Afebrile overnight. Hemodynamics improved. No n/v. Minimal abdominal pain complaints today. Objective Patient Vitals for the past 24 hrs: 
 Temp Pulse Resp BP SpO2  
05/08/19 0900  95 28 141/82 96 % 05/08/19 0800 98.5 °F (36.9 °C) 84 28 128/75 99 % 05/08/19 0700  80 25 135/78 98 % 05/08/19 0600  82 22 128/70 94 % 05/08/19 0500  89 23 114/69 94 % 05/08/19 0400 98.5 °F (36.9 °C) 82 23 120/67 95 % 05/08/19 0314     95 % 05/08/19 0300  73 23 100/59 93 % 05/08/19 0200  79 21 95/57 94 % 05/08/19 0100  81 22 107/50 96 % 05/08/19 0000 98.2 °F (36.8 °C) 88 19 118/77 96 % 05/07/19 2302     94 % 05/07/19 2300  85 23 109/73 95 % 05/07/19 2200  89 23 115/66 97 % 05/07/19 2100  88 21 133/70 95 % 05/07/19 2000 98 °F (36.7 °C) 80 15 (!) 113/93 99 % 05/07/19 1949     98 % 05/07/19 1900  83 21 118/82 96 % 05/07/19 1800 97.2 °F (36.2 °C) 92 21 115/65 100 % 05/07/19 1730  85 16 118/66 98 % 05/07/19 1700  86 18 121/67   
05/07/19 1640  72 18 109/57 100 % 05/07/19 1631  83 16 112/57 100 % 05/07/19 1615  85 21 111/60 99 % 05/07/19 1606  93 17 119/73 (!) 89 % 05/07/19 1530  79 22 99/84 98 % 05/07/19 1445  71 19 95/56   
05/07/19 1415  82 19 94/58   
05/07/19 1315  91 22 117/68 100 % 05/07/19 1215  (!) 104 27 100/66 93 % 05/07/19 1145  (!) 115 28 124/71 96 % 05/07/19 1130  (!) 105 22 102/67 98 % 05/07/19 1057 (P) 98.9 °F (37.2 °C) (!) 106 (!) 31 148/65 95 % Date 05/07/19 0700 - 05/08/19 4365 05/08/19 0700 - 05/09/19 6090 Shift 9598-5509 0251-3689 24 Hour Total 5222-6336 1116-6822 24 Hour Total  
INTAKE  
P.O.    120  120  
  P. O.    120  120 I.V.  1183.8(1.5) 1183.8(0.8) 369 996 Volume (0.9% sodium chloride infusion)  883.8 883.8 225  225 Volume (piperacillin-tazobactam (ZOSYN) 3.375 g in 0.9% sodium chloride (MBP/ADV) 100 mL)  100 100 100  100 Volume (doxycycline (VIBRAMYCIN) 100 mg in 0.9% sodium chloride (MBP/ADV) 100 mL)  100 100 100  100 Volume (metroNIDAZOLE (FLAGYL) IVPB premix 500 mg)  100 100 Shift Total(mL/kg)  1183. 8(18.2) 1183. 8(18.2) 545(8.4)  545(8.4) OUTPUT Urine  100(0.1) 100(0.1) 100  100 Urine Occurrence(s)  1 x 1 x Urine Output (mL) (External Female Catheter 05/07/19)  100 100 100  100 Drains  100 100 5  5 Output (ml) (Drain Abcession pigtail drain 05/07/19 Right Abdomen)  100 100 5  5 Shift Total(mL/kg)  200(3.1) 200(3.1) 105(1.6)  105(1.6) NET  983.8 983.8 440  440 Weight (kg)  65.2 65.2 65.2 65.2 65.2 PE 
GEN - Awake, alert, communicating appropriately. NAD Abd - soft, some distention. Minimally tender in RUQ around drain. Drain with bilious output. Labs Recent Results (from the past 24 hour(s)) GLUCOSE, POC Collection Time: 05/07/19 10:57 AM  
Result Value Ref Range Glucose (POC) 183 (H) 65 - 100 mg/dL Performed by [facility]   
EKG, 12 LEAD, INITIAL Collection Time: 05/07/19 10:58 AM  
Result Value Ref Range Ventricular Rate 0 BPM  
 Atrial Rate 0 BPM  
 QRS Duration 0 ms Q-T Interval 0 ms QTC Calculation (Bezet) 0 ms Calculated R Axis 0 degrees Calculated T Axis 0 degrees Diagnosis ** No QRS complexes found, no ECG analysis possible ** When compared with ECG of 05-FEB-2019 11:17, 
Current undetermined rhythm precludes rhythm comparison, needs review SAMPLES BEING HELD Collection Time: 05/07/19 11:13 AM  
Result Value Ref Range SAMPLES BEING HELD 1RD,1BL   
 COMMENT Add-on orders for these samples will be processed based on acceptable specimen integrity and analyte stability, which may vary by analyte.   
CBC WITH AUTOMATED DIFF  
 Collection Time: 05/07/19 11:13 AM  
Result Value Ref Range WBC 24.8 (H) 3.6 - 11.0 K/uL  
 RBC 4.35 3.80 - 5.20 M/uL  
 HGB 13.3 11.5 - 16.0 g/dL HCT 41.0 35.0 - 47.0 % MCV 94.3 80.0 - 99.0 FL  
 MCH 30.6 26.0 - 34.0 PG  
 MCHC 32.4 30.0 - 36.5 g/dL  
 RDW 12.7 11.5 - 14.5 % PLATELET 464 169 - 296 K/uL MPV 11.1 8.9 - 12.9 FL  
 NRBC 0.0 0  WBC ABSOLUTE NRBC 0.00 0.00 - 0.01 K/uL NEUTROPHILS 92 (H) 32 - 75 % LYMPHOCYTES 3 (L) 12 - 49 % MONOCYTES 4 (L) 5 - 13 % EOSINOPHILS 0 0 - 7 % BASOPHILS 0 0 - 1 % IMMATURE GRANULOCYTES 1 (H) 0.0 - 0.5 % ABS. NEUTROPHILS 22.9 (H) 1.8 - 8.0 K/UL  
 ABS. LYMPHOCYTES 0.7 (L) 0.8 - 3.5 K/UL  
 ABS. MONOCYTES 1.0 0.0 - 1.0 K/UL  
 ABS. EOSINOPHILS 0.0 0.0 - 0.4 K/UL  
 ABS. BASOPHILS 0.0 0.0 - 0.1 K/UL  
 ABS. IMM. GRANS. 0.2 (H) 0.00 - 0.04 K/UL  
 DF SMEAR SCANNED    
 RBC COMMENTS NORMOCYTIC, NORMOCHROMIC METABOLIC PANEL, COMPREHENSIVE Collection Time: 05/07/19 11:13 AM  
Result Value Ref Range Sodium 142 136 - 145 mmol/L Potassium 3.3 (L) 3.5 - 5.1 mmol/L Chloride 109 (H) 97 - 108 mmol/L  
 CO2 23 21 - 32 mmol/L Anion gap 10 5 - 15 mmol/L Glucose 179 (H) 65 - 100 mg/dL BUN 39 (H) 6 - 20 MG/DL Creatinine 1.10 (H) 0.55 - 1.02 MG/DL  
 BUN/Creatinine ratio 35 (H) 12 - 20 GFR est AA 56 (L) >60 ml/min/1.73m2 GFR est non-AA 46 (L) >60 ml/min/1.73m2 Calcium 9.1 8.5 - 10.1 MG/DL Bilirubin, total 0.6 0.2 - 1.0 MG/DL  
 ALT (SGPT) 33 12 - 78 U/L  
 AST (SGOT) 38 (H) 15 - 37 U/L Alk. phosphatase 67 45 - 117 U/L Protein, total 6.5 6.4 - 8.2 g/dL Albumin 2.7 (L) 3.5 - 5.0 g/dL Globulin 3.8 2.0 - 4.0 g/dL A-G Ratio 0.7 (L) 1.1 - 2.2    
TROPONIN I Collection Time: 05/07/19 11:13 AM  
Result Value Ref Range Troponin-I, Qt. <0.05 <0.05 ng/mL NT-PRO BNP Collection Time: 05/07/19 11:13 AM  
Result Value Ref Range  NT pro- (H) <450 PG/ML  
MAGNESIUM  
 Collection Time: 05/07/19 11:13 AM  
Result Value Ref Range Magnesium 2.3 1.6 - 2.4 mg/dL LACTIC ACID Collection Time: 05/07/19 11:13 AM  
Result Value Ref Range Lactic acid 2.0 0.4 - 2.0 MMOL/L  
POC G3 - PUL Collection Time: 05/07/19 11:20 AM  
Result Value Ref Range pH (POC) 7.329 (L) 7.35 - 7.45    
 pCO2 (POC) 44.7 35.0 - 45.0 MMHG  
 pO2 (POC) 157 (H) 80 - 100 MMHG  
 HCO3 (POC) 23.5 22 - 26 MMOL/L  
 sO2 (POC) 99 (H) 92 - 97 % Base deficit (POC) 2 mmol/L Site LEFT BRACHIAL Device: Non rebreather Flow rate (POC) 15 L/M Allens test (POC) N/A Specimen type (POC) ARTERIAL URINALYSIS W/MICROSCOPIC Collection Time: 05/07/19 11:42 AM  
Result Value Ref Range Color YELLOW/STRAW Appearance TURBID (A) CLEAR Specific gravity 1.021 1.003 - 1.030    
 pH (UA) 5.5 5.0 - 8.0 Protein 100 (A) NEG mg/dL Glucose NEGATIVE  NEG mg/dL Ketone NEGATIVE  NEG mg/dL Bilirubin NEGATIVE  NEG Blood MODERATE (A) NEG Urobilinogen 0.2 0.2 - 1.0 EU/dL Nitrites POSITIVE (A) NEG Leukocyte Esterase LARGE (A) NEG    
 WBC >100 (H) 0 - 4 /hpf  
 RBC 5-10 0 - 5 /hpf Epithelial cells FEW FEW /lpf Bacteria 2+ (A) NEG /hpf  
 Granular cast 5-10 (A) NEG /lpf URINE CULTURE HOLD SAMPLE Collection Time: 05/07/19 11:42 AM  
Result Value Ref Range Urine culture hold URINE ON HOLD IN MICROBIOLOGY DEPT FOR 3 DAYS. IF UNPRESERVED URINE IS SUBMITTED, IT CANNOT BE USED FOR ADDITIONAL TESTING AFTER 24 HRS, RECOLLECTION WILL BE REQUIRED. CULTURE, URINE Collection Time: 05/07/19 11:42 AM  
Result Value Ref Range Special Requests: NO SPECIAL REQUESTS [place] Count [id number] COLONIES/mL Culture result: GRAM NEGATIVE RODS (A) DRUG SCREEN, URINE Collection Time: 05/07/19  1:56 PM  
Result Value Ref Range AMPHETAMINES NEGATIVE  NEG    
 BARBITURATES NEGATIVE  NEG  BENZODIAZEPINES NEGATIVE  NEG    
 COCAINE NEGATIVE  NEG    
 METHADONE NEGATIVE  NEG    
 OPIATES NEGATIVE  NEG    
 PCP(PHENCYCLIDINE) NEGATIVE  NEG    
 THC (TH-CANNABINOL) NEGATIVE  NEG Drug screen comment (NOTE) CULTURE, BODY FLUID W GRAM STAIN Collection Time: 05/07/19  4:40 PM  
Result Value Ref Range Special Requests: BILE DRAINAGE   
 GRAM STAIN NO WBC'S SEEN    
 GRAM STAIN NO ORGANISMS SEEN Culture result: PENDING   
SAMPLES BEING HELD Collection Time: 05/07/19  4:40 PM  
Result Value Ref Range SAMPLES BEING HELD 1WHITE TOP SWAB COMMENT Add-on orders for these samples will be processed based on acceptable specimen integrity and analyte stability, which may vary by analyte. METABOLIC PANEL, BASIC Collection Time: 05/08/19  6:47 AM  
Result Value Ref Range Sodium 144 136 - 145 mmol/L Potassium 2.9 (L) 3.5 - 5.1 mmol/L Chloride 114 (H) 97 - 108 mmol/L  
 CO2 26 21 - 32 mmol/L Anion gap 4 (L) 5 - 15 mmol/L Glucose 84 65 - 100 mg/dL BUN 33 (H) 6 - 20 MG/DL Creatinine 0.91 0.55 - 1.02 MG/DL  
 BUN/Creatinine ratio 36 (H) 12 - 20 GFR est AA >60 >60 ml/min/1.73m2 GFR est non-AA 57 (L) >60 ml/min/1.73m2 Calcium 8.4 (L) 8.5 - 10.1 MG/DL  
CBC WITH AUTOMATED DIFF Collection Time: 05/08/19  6:47 AM  
Result Value Ref Range WBC 12.7 (H) 3.6 - 11.0 K/uL  
 RBC 3.58 (L) 3.80 - 5.20 M/uL  
 HGB 10.6 (L) 11.5 - 16.0 g/dL HCT 34.7 (L) 35.0 - 47.0 % MCV 96.9 80.0 - 99.0 FL  
 MCH 29.6 26.0 - 34.0 PG  
 MCHC 30.5 30.0 - 36.5 g/dL  
 RDW 12.9 11.5 - 14.5 % PLATELET 745 726 - 001 K/uL MPV 10.9 8.9 - 12.9 FL  
 NRBC 0.0 0  WBC ABSOLUTE NRBC 0.00 0.00 - 0.01 K/uL NEUTROPHILS 87 (H) 32 - 75 % LYMPHOCYTES 6 (L) 12 - 49 % MONOCYTES 6 5 - 13 % EOSINOPHILS 0 0 - 7 % BASOPHILS 0 0 - 1 % IMMATURE GRANULOCYTES 1 (H) 0.0 - 0.5 % ABS. NEUTROPHILS 11.0 (H) 1.8 - 8.0 K/UL  
 ABS. LYMPHOCYTES 0.8 0.8 - 3.5 K/UL  
 ABS. MONOCYTES 0.8 0.0 - 1.0 K/UL ABS. EOSINOPHILS 0.0 0.0 - 0.4 K/UL  
 ABS. BASOPHILS 0.0 0.0 - 0.1 K/UL  
 ABS. IMM. GRANS. 0.1 (H) 0.00 - 0.04 K/UL  
 RBC COMMENTS NORMOCYTIC, NORMOCHROMIC    
 DF SMEAR SCANNED    
LACTIC ACID Collection Time: 05/08/19  6:47 AM  
Result Value Ref Range Lactic acid 1.1 0.4 - 2.0 MMOL/L Assessment Dariel Romero is a 80 y. o.yr old female admitted with sepsis who was found unresponsive yesterday. Possible etiologies include cholecystitis with possible liver abscess, UTI and pneumonia. S/p cholecystostomy tube. Plan  
 
- Continue IR drain to gravity. This will need to be in for at least a couple of weeks before it can be removed to prevent bile leakage. Would plan for tube cholangiogram before removal of tube. - Advance to GI lite diet 
- Continue antibiotics. WBC improving. Follow cultures - Agree that patient appears stable for transfer out of ICU.  
 
Kyaw Elizabeth MD 
5/8/2019 
9:57 AM

## 2019-05-08 NOTE — H&P
[address] HISTORY AND PHYSICAL Name:  Julius Yi 
MR#:  [id number] :  [id number] ACCOUNT #:  [de-identified] ADMIT DATE:  2019 CHIEF COMPLAINT:  Altered mental status. HISTORY OF PRESENT ILLNESS:  The patient is a 80-year-old female with past medical history of hypertension, GERD, pneumonia, COPD, Mycobacterium avium complex infection, who presents to the hospital with the above-mentioned symptom. History was primarily obtained from the daughters, who is present at the bedside. Her daughters report that today the patient was at her baseline, had her breakfast at around 09:30, and at around 10:00 a.m., became confused and disoriented. Per daughters, she did not completely pass out or lost consciousness, and she was responsive. Per ER physician, patient was found to be unresponsive. Regardless, EMS got there. They found the patient with blood pressure of 175/90. The patient was mildly hypoxic with pulse ox of 64% on room air, which improved by placing a nasal trumpet and O2 mask. The patient was started on Rocephin last night because they were concerned for a UTI and that she may have had an allergic reaction. She was given Benadryl. The patient was brought to the hospital and was requested to be admitted under the Hospitalist Service. When I went to evaluate the patient, she was quite confused, very lethargic, opening eyes to verbal commands, but not really answering much questions. The daughters report that usually she is alert and awake. She is clutching her abdomen and appears to be in pain, does not answer much of our questions and no review of symptoms could be done because of the patient's altered mental status. PAST MEDICAL HISTORY:  See above. HOME MEDICATIONS:  Currently, the patient is on, 
1. Oxycodone 5/325 mg every six hours. 2.  Losartan/hydrochlorothiazide one tablet daily. 3.  Omeprazole 20 mg daily. 4.  Antivert 12.5 mg nightly as needed. 5.  Multivitamin. 6.  Tessalon 200 mg b.i.d. as needed. 7.  Zithromax 200 mg daily. 8.  Ethambutol 400 mg daily. 9.  Florastor 250 mg daily. 10.  Loperamide. 11.  Cholecalciferol. 12.  Tylenol. SOCIAL HISTORY:  No history of tobacco abuse, alcohol use or IV drug abuse. Lives at home. ALLERGIES:  NO KNOWN DRUG ALLERGIES. FAMILY HISTORY:  No significant family history noted in the chart. Could not be obtained from the patient due to her altered mental status. REVIEW OF SYMPTOMS:  Suboptimal due to the patient's altered mental status. PHYSICAL EXAMINATION: 
GENERAL:  Alert x1, awake, lethargic female, who appears to be stated age. VITAL SIGNS:  Temperature is not in the chart. Pulse 79, respiratory rate 22, blood pressure 99/54, pulse ox 98% on 4 L. HEENT:  Pupils are equal and reactive to light. Dry mucous membranes. Tympanic membranes are clear. NECK:  Supple. CHEST:  Decreased basilar breath sounds with coarse breath sounds in bilateral lung bases. CORONARY:  Tachycardic. ABDOMEN:  Soft. Tender to palpation in the right upper quadrant. No rebound. Mild guarding. Bowel sounds were hyperactive. EXTREMITIES:  No clubbing, no cyanosis, no edema. NEURO/PSYCH:  Limited exam.  DTRs 1+ x4. Rest of the exam could not be performed as the patient is not cooperating. SKIN:  Warm. LABORATORY DATA:  White count 24.8, hemoglobin 13.3, hematocrit 41, platelets 444. Urine shows moderate blood, large leukocyte esterase, positive for nitrites, greater than 100 wbc's, 2+ bacteria. Sodium 142, potassium 3.3, chloride 109, bicarb 23, anion gap 7, glucose 179, BUN 39, creatinine 1.10, calcium 9.1, magnesium 2.3, bilirubin total 0.6, ALT 33, AST 38, alkaline phosphatase 67. Lactic acid 2. Troponin less than 0.05 and . Urine drug screen is negative.   ABG shows a pH of 7.329, pCO2 of 44.7, pO2 of 157. Methadone is negative. CT of the abdomen and pelvis shows cholecystitis consistent with liver abscess or new ascites, right lower lobe pneumonia with right pleural exudate. CT of the head shows moderate white matter disease. No acute intracranial hemorrhage. Incidental bilateral mastoid air cells, chronic in nature. X-ray of the chest shows new pulmonary edema with bilateral pleural effusions. EKG was not scanned in the system and is awaited. ASSESSMENT AND PLAN: 
1. Pneumonia with systemic inflammatory response syndrome with possible early sepsis. The patient will be admitted on a telemetry bed. Start patient on broad spectrum IV antibiotics, DuoNebs, IV fluids, blood cultures, urine cultures, supportive care, and continue to closely monitor. May consider further intervention and diagnostics if symptoms persist.  We will get Legionella and Streptococcus antigen. Provide DuoNebs and oxygen support. Continue to closely monitor. 2.  Acute hypoxic respiratory failure, most likely secondary to pneumonia. Please see above. We will provide supportive care, treat the underlying cause, oxygen support and continue to monitor. Further intervention per hospital course. ABG was done and may consider repeating it in 4 to 6 hours. We will provide DuoNebs and monitor for now. May consider getting Pulmonary consult if persists. 3.  Possible acute cholecystitis with gallbladder/liver abscess. I spoke with Dr. Rossana Rea from Surgery. He recommends that patient is not a surgical candidate, but he may need Interventional Radiology to place a drain to drain the abscess. I spoke with Dr. Pramod Melara from Interventional Radiology. He will be placing a drain under fluoroscopic control. We will provide broad-spectrum antibiotics, keep patient n.p.o. and provide IV hydration, supportive care and continue to closely monitor. Reassess as needed. 4. Urinary tract infection. Provide IV hydration, IV antibiotics, urine culture. Repeat laboratories in the morning. Continue to closely monitor. 5.  Leukocytosis, most likely secondary to pneumonia, acute hypoxic respiratory failure, and possible acute cholecystitis with gallbladder/liver abscess. We will treat the underlying cause and we will continue to closely monitor. 6.  Acute metabolic encephalopathy, most likely secondary to pneumonia, acute hypoxic respiratory failure, and possible acute cholecystitis with gallbladder/liver abscess. We will treat the underlying infection and provide IV hydration. CT of the head does not show any acute pathology. We will provide neurovascular checks. If symptoms persist, may consider getting an MRI. Supportive care and monitor for now. Reassess as needed and further intervention per hospital course. 7.  Dehydration, provide gentle IV hydration. Repeat labs in the morning. Continue to closely monitor. 8.  Gastrointestinal and deep venous thrombosis prophylaxes. The patient will be on heparin. 9.  The patient appears to be acutely ill. I spoke with patient's both daughters at the bedside. They reported that the patient is a do not resuscitate and understand that patient has significant morbidity and is at high risk for mortality. Huma Jiang MD 
 
 
[id number] 
D:  05/07/2019 16:16 
T:  05/07/2019 19:14 
JOB #:  [id number]

## 2019-05-08 NOTE — PROGRESS NOTES
2330: Bedside and Verbal shift change report given to [address] (oncoming nurse) by Sneha Taylor RN (offgoing nurse). Report included the following information SBAR, Kardex, ED Summary, Procedure Summary, MAR, Accordion, Recent Results and Cardiac Rhythm paced . 0400: Attempted to draw AM labs. Pt refusing labs and becomes agitated. Will attempt labs later in the morning.

## 2019-05-09 LAB
ALBUMIN SERPL-MCNC: 2.2 G/DL (ref 3.5–5)
ALBUMIN/GLOB SERPL: 0.7 {RATIO} (ref 1.1–2.2)
ALP SERPL-CCNC: 56 U/L (ref 45–117)
ALT SERPL-CCNC: 32 U/L (ref 12–78)
ANION GAP SERPL CALC-SCNC: 8 MMOL/L (ref 5–15)
AST SERPL-CCNC: 31 U/L (ref 15–37)
BACTERIA SPEC CULT: ABNORMAL
BILIRUB SERPL-MCNC: 0.8 MG/DL (ref 0.2–1)
BUN SERPL-MCNC: 28 MG/DL (ref 6–20)
BUN/CREAT SERPL: 35 (ref 12–20)
CALCIUM SERPL-MCNC: 8.3 MG/DL (ref 8.5–10.1)
CC UR VC: ABNORMAL
CHLORIDE SERPL-SCNC: 115 MMOL/L (ref 97–108)
CO2 SERPL-SCNC: 23 MMOL/L (ref 21–32)
CREAT SERPL-MCNC: 0.8 MG/DL (ref 0.55–1.02)
ERYTHROCYTE [DISTWIDTH] IN BLOOD BY AUTOMATED COUNT: 12.8 % (ref 11.5–14.5)
GLOBULIN SER CALC-MCNC: 3.2 G/DL (ref 2–4)
GLUCOSE SERPL-MCNC: 79 MG/DL (ref 65–100)
HCT VFR BLD AUTO: 33.6 % (ref 35–47)
HGB BLD-MCNC: 11 G/DL (ref 11.5–16)
MCH RBC QN AUTO: 30.6 PG (ref 26–34)
MCHC RBC AUTO-ENTMCNC: 32.7 G/DL (ref 30–36.5)
MCV RBC AUTO: 93.3 FL (ref 80–99)
NRBC # BLD: 0 K/UL (ref 0–0.01)
NRBC BLD-RTO: 0 PER 100 WBC
PLATELET # BLD AUTO: 249 K/UL (ref 150–400)
PMV BLD AUTO: 11 FL (ref 8.9–12.9)
POTASSIUM SERPL-SCNC: 3.2 MMOL/L (ref 3.5–5.1)
PROT SERPL-MCNC: 5.4 G/DL (ref 6.4–8.2)
RBC # BLD AUTO: 3.6 M/UL (ref 3.8–5.2)
SERVICE CMNT-IMP: ABNORMAL
SODIUM SERPL-SCNC: 146 MMOL/L (ref 136–145)
WBC # BLD AUTO: 13.1 K/UL (ref 3.6–11)

## 2019-05-09 PROCEDURE — 85027 COMPLETE CBC AUTOMATED: CPT

## 2019-05-09 PROCEDURE — 97165 OT EVAL LOW COMPLEX 30 MIN: CPT

## 2019-05-09 PROCEDURE — 87449 NOS EACH ORGANISM AG IA: CPT

## 2019-05-09 PROCEDURE — 74011250636 HC RX REV CODE- 250/636: Performed by: INTERNAL MEDICINE

## 2019-05-09 PROCEDURE — 77010033678 HC OXYGEN DAILY

## 2019-05-09 PROCEDURE — 36415 COLL VENOUS BLD VENIPUNCTURE: CPT

## 2019-05-09 PROCEDURE — 94760 N-INVAS EAR/PLS OXIMETRY 1: CPT

## 2019-05-09 PROCEDURE — 74011000258 HC RX REV CODE- 258: Performed by: INTERNAL MEDICINE

## 2019-05-09 PROCEDURE — 74011250637 HC RX REV CODE- 250/637: Performed by: INTERNAL MEDICINE

## 2019-05-09 PROCEDURE — 74011000250 HC RX REV CODE- 250: Performed by: INTERNAL MEDICINE

## 2019-05-09 PROCEDURE — 94640 AIRWAY INHALATION TREATMENT: CPT

## 2019-05-09 PROCEDURE — 97530 THERAPEUTIC ACTIVITIES: CPT

## 2019-05-09 PROCEDURE — 65660000000 HC RM CCU STEPDOWN

## 2019-05-09 PROCEDURE — 80053 COMPREHEN METABOLIC PANEL: CPT

## 2019-05-09 PROCEDURE — 97161 PT EVAL LOW COMPLEX 20 MIN: CPT

## 2019-05-09 PROCEDURE — 97535 SELF CARE MNGMENT TRAINING: CPT

## 2019-05-09 RX ORDER — IPRATROPIUM BROMIDE AND ALBUTEROL SULFATE 2.5; .5 MG/3ML; MG/3ML
3 SOLUTION RESPIRATORY (INHALATION)
Status: DISCONTINUED | OUTPATIENT
Start: 2019-05-09 | End: 2019-05-15 | Stop reason: HOSPADM

## 2019-05-09 RX ORDER — LOSARTAN POTASSIUM 50 MG/1
100 TABLET ORAL DAILY
Status: DISCONTINUED | OUTPATIENT
Start: 2019-05-09 | End: 2019-05-15 | Stop reason: HOSPADM

## 2019-05-09 RX ORDER — ONDANSETRON 2 MG/ML
4 INJECTION INTRAMUSCULAR; INTRAVENOUS
Status: DISCONTINUED | OUTPATIENT
Start: 2019-05-09 | End: 2019-05-15 | Stop reason: HOSPADM

## 2019-05-09 RX ORDER — HYDROCHLOROTHIAZIDE 25 MG/1
25 TABLET ORAL DAILY
Status: DISCONTINUED | OUTPATIENT
Start: 2019-05-09 | End: 2019-05-15 | Stop reason: HOSPADM

## 2019-05-09 RX ORDER — POTASSIUM CHLORIDE 750 MG/1
40 TABLET, FILM COATED, EXTENDED RELEASE ORAL
Status: COMPLETED | OUTPATIENT
Start: 2019-05-09 | End: 2019-05-09

## 2019-05-09 RX ADMIN — LOSARTAN POTASSIUM 100 MG: 50 TABLET ORAL at 10:11

## 2019-05-09 RX ADMIN — HYDROCHLOROTHIAZIDE 25 MG: 25 TABLET ORAL at 10:18

## 2019-05-09 RX ADMIN — IPRATROPIUM BROMIDE AND ALBUTEROL SULFATE 3 ML: .5; 3 SOLUTION RESPIRATORY (INHALATION) at 09:14

## 2019-05-09 RX ADMIN — HEPARIN SODIUM 5000 UNITS: 5000 INJECTION INTRAVENOUS; SUBCUTANEOUS at 02:29

## 2019-05-09 RX ADMIN — HEPARIN SODIUM 5000 UNITS: 5000 INJECTION INTRAVENOUS; SUBCUTANEOUS at 10:12

## 2019-05-09 RX ADMIN — PIPERACILLIN AND TAZOBACTAM 3.38 G: 3; .375 INJECTION, POWDER, FOR SOLUTION INTRAVENOUS at 17:30

## 2019-05-09 RX ADMIN — ETHAMBUTOL HYDROCHLORIDE 800 MG: 400 TABLET, FILM COATED ORAL at 10:18

## 2019-05-09 RX ADMIN — POTASSIUM CHLORIDE 40 MEQ: 750 TABLET, EXTENDED RELEASE ORAL at 10:11

## 2019-05-09 RX ADMIN — Medication 10 ML: at 06:26

## 2019-05-09 RX ADMIN — PIPERACILLIN AND TAZOBACTAM 3.38 G: 3; .375 INJECTION, POWDER, FOR SOLUTION INTRAVENOUS at 06:25

## 2019-05-09 RX ADMIN — BUDESONIDE 500 MCG: 0.5 INHALANT RESPIRATORY (INHALATION) at 09:14

## 2019-05-09 RX ADMIN — Medication 1 CAPSULE: at 10:11

## 2019-05-09 RX ADMIN — AZITHROMYCIN 250 MG: 250 TABLET, FILM COATED ORAL at 10:11

## 2019-05-09 RX ADMIN — Medication 10 ML: at 22:00

## 2019-05-09 RX ADMIN — HEPARIN SODIUM 5000 UNITS: 5000 INJECTION INTRAVENOUS; SUBCUTANEOUS at 17:49

## 2019-05-09 NOTE — PROGRESS NOTES
OCCUPATIONAL THERAPY EVALUATION Patient: Madhavi Nguyen (89 y.o. female) Date: 5/9/2019 Primary Diagnosis: Altered mental status [R41.82] Precautions:   Contact, Fall, Bed Alarm ASSESSMENT : 
Based on the objective data described below, the patient presents with decreased independence with self care and functional mobility following admission for hypoxia and AMS. Pt received in chair with daughters present who assist with providing PLOF information. Pt progressed to [facility] with mod A with total A for hygiene and clothing management. Pt does following directions but inconsistent which increases her fall risk. Pt agreeable to all intervention at this time but has periods of increased confusion and slight agitation. Recommend OT to progress with grooming activities, toileting and toilet transfers as appropriate. She needs generalized activity to increase activity tolerance and endurance prior to return home. PLOF:  Has support for ADL at home. She also has home care staff to help with ADL activities. Family reporting she has about 50% support for dressing activities. Discharge recommendations:  She may requires discharge to rehab pending continued progress with all activities. If she does progress well while here, she may be able to return to [facility]. Patient will benefit from skilled intervention to address the above impairments. Patient?s rehabilitation potential is considered to be Good Factors which may influence rehabilitation potential include: ? None noted ? Mental ability/status ? Medical condition ? Home/family situation and support systems ? Safety awareness ? Pain tolerance/management ? Other: PLAN : 
Recommendations and Planned Interventions: 
?               Self Care Training                  ? Therapeutic Activities ?               Functional Mobility Training    ? Cognitive Retraining 
? Therapeutic Exercises           ? Endurance Activities ? Balance Training                   ? Neuromuscular Re-Education ? Visual/Perceptual Training     ? Home Safety Training 
? Patient Education                 ? Family Training/Education ? Other (comment): Frequency/Duration: Patient will be followed by occupational therapy 5 times a week to address goals. Discharge Recommendations: Rehab and Home Health Further Equipment Recommendations for Discharge: TBD SUBJECTIVE:  
Patient stated ? I am feeling. ? OBJECTIVE DATA SUMMARY:  
HISTORY:  
Past Medical History:  
Diagnosis Date Bronchiectasis ([facility] [address] Cancer ([facility] [address] SCC Dementia Gastrointestinal disorder   
 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   
 microbacterium isidra- intracellular lung disease PNA (pneumonia) Vertigo Past Surgical History:  
Procedure Laterality Date ABDOMEN SURGERY PROC UNLISTED    
 colon tumor removed 1982 HX COLECTOMY [facility] PARTIAL COLON RESECTION  
 HX HEENT    
 AS A CHILD  
 HX UROLOGICAL    
 bladder sling 2007 IR CHOLECYSTOSTOMY PERCUTANEOUS  5/7/2019 [place] NEW/RPLCMT PRM PM W/TRANSV ELTRD ATRIAL&VENT  12/13/2017 Prior Level of Function/Environment/Context: Pt has assistance at home. She lives in memory care. Expanded or extensive additional review of patient history:  
 
Home Situation Home Environment: Assisted living One/Two Story Residence: One story Living Alone: No 
Support Systems: Home care staff Patient Expects to be Discharged to[de-identified] Assisted living Current DME Used/Available at Home: Walker, rolling Tub or Shower Type: Shower Hand dominance: Right EXAMINATION OF PERFORMANCE DEFICITS: 
Cognitive/Behavioral Status: 
Neurologic State: Alert;Confused Orientation Level: Oriented to person;Disoriented to time;Disoriented to situation;Disoriented to place Cognition: Follows commands(but inconsistent) Perception: Appears intact Perseveration: No perseveration noted Safety/Judgement: Decreased insight into deficits; Decreased awareness of need for safety;Decreased awareness of need for assistance;Decreased awareness of environment Skin: see nursing notes Edema: none noted Hearing: Auditory Auditory Impairment: None Vision/Perceptual:   
    
    
    
  
    
Acuity: Within Defined Limits Corrective Lenses: Glasses Range of Motion: 
AROM: Generally decreased, functional 
PROM: Generally decreased, functional 
  
  
  
  
  
  
Strength: 
Strength: Generally decreased, functional 
  
  
  
  
Coordination: 
Coordination: Generally decreased, functional 
Fine Motor Skills-Upper: Right Intact; Left Intact Gross Motor Skills-Upper: Right Intact; Left Intact Tone & Sensation: 
Tone: Normal 
Sensation: Intact Balance: 
Sitting: Intact Standing: Impaired Standing - Static: Poor Standing - Dynamic : Poor Functional Mobility and Transfers for ADLs: 
Bed Mobility: 
Supine to Sit: (recieved in chair) Sit to Supine: Maximum assistance Scooting: (up in chair after) Transfers: 
Sit to Stand: Moderate assistance Stand to Sit: Moderate assistance Bed to Chair: Moderate assistance Bathroom Mobility: (recommend BSC use only) Toilet Transfer : Moderate assistance ADL Assessment: 
Feeding: Supervision Oral Facial Hygiene/Grooming: Supervision Bathing: Maximum assistance Upper Body Dressing: Maximum assistance Lower Body Dressing: Total assistance Toileting: Total assistance ADL Intervention and task modifications: 
 Received in chair. Progressed from chair to [address] with mod A to stand and mod A to transfer to [address] She is not yet ready to ambulate to the bathroom. Pt requires total A for hygiene and clothing management. Pt transferred from [address] to EOB and did need constant cues for technique to complete transfer. Pt impulsive and at increased fall risk due to improper use of walker and difficulty with following directions reporting \"therapist is bossy. \"  pt returned to supine in bed due to fatigue and HOB elevated due to CLARK. Cognitive Retraining Safety/Judgement: Decreased insight into deficits; Decreased awareness of need for safety;Decreased awareness of need for assistance;Decreased awareness of environment Functional Measure: 
Barthel Index: 
Bathin Bladder: 5 Bowels: 5 Groomin Dressin Feeding: 10 Mobility: 0 Stairs: 0 Toilet Use: 0 Transfer (Bed to Chair and Back): 5 Total: 30/100 Percentage of impairment  
0% 1-19% 20-39% 40-59% 60-79% 80-99% 100% Barthel Score 0-100 100 99-80 79-60 59-40 20-39 1-19 
 0 The Barthel ADL Index: Guidelines 1. The index should be used as a record of what a patient does, not as a record of what a patient could do. 2. The main aim is to establish degree of independence from any help, physical or verbal, however minor and for whatever reason. 3. The need for supervision renders the patient not independent. 4. A patient's performance should be established using the best available evidence. Asking the patient, friends/relatives and nurses are the usual sources, but direct observation and common sense are also important. However direct testing is not needed. 5. Usually the patient's performance over the preceding 24-48 hours is important, but occasionally longer periods will be relevant. 6. Middle categories imply that the patient supplies over 50 per cent of the effort. 7. Use of aids to be independent is allowed. Chrys Hash., Barthel, D.W. (1353). Functional evaluation: the Barthel Index. [address] (14)2. MABEL Pepe, Zeus Batista., Isaiah Diallo., Annalise, [address] (1999). Measuring the change indisability after inpatient rehabilitation; comparison of the responsiveness of the Barthel Index and Functional Graham Measure. Journal of Neurology, Neurosurgery, and Psychiatry, 66(4), 959-702. Lyndsey Tavarez, NBECK, RAND Matt, & Diya Elizalde M.A. (2004.) Assessment of post-stroke quality of life in cost-effectiveness studies: The usefulness of the Barthel Index and the EuroQoL-5D. [facility], 13, 417-67 Occupational Therapy Evaluation Charge Determination History Examination Decision-Making HIGH Complexity : Extensive review of history including physical, cognitive and psychosocial history  HIGH Complexity : 5 or more performance deficits relating to physical, cognitive , or psychosocial skils that result in activity limitations and / or participation restrictions HIGH Complexity : Patient presents with comorbidities that affect occupational performance. Signifigant modification of tasks or assistance (eg, physical or verbal) with assessment (s) is necessary to enable patient to complete evaluation Based on the above components, the patient evaluation is determined to be of the following complexity level: HIGH Pain: 
Pain Scale 1: Numeric (0 - 10) Pain Intensity 1: 0 Activity Tolerance: VSS throughout session. After treatment:  
? Patient left in no apparent distress sitting up in chair ? Patient left in no apparent distress in bed 
? Call bell left within reach ? Nursing notified ? Caregiver present ? Bed alarm activated COMMUNICATION/EDUCATION:  
The patient?s plan of care was discussed with: Physical Therapist and Registered Nurse. ? Home safety education was provided and the patient/caregiver indicated understanding. ? Patient/family have participated as able in goal setting and plan of care. ? Patient/family agree to work toward stated goals and plan of care. ? Patient understands intent and goals of therapy, but is neutral about his/her participation. ? Patient is unable to participate in goal setting and plan of care. This patient?s plan of care is appropriate for delegation to KAYLA. Thank you for this referral. 
Sasha Martinez OT Time Calculation: 40 mins

## 2019-05-09 NOTE — PROGRESS NOTES
Primary Nurse Heather Haley RN and Sivakumar Gusman RN performed a dual skin assessment on this patient No impairment noted Ever score is 12

## 2019-05-09 NOTE — PROGRESS NOTES
Bedside shift change report given to Caesar Gutiérrez RN (oncoming nurse) by Katie Martinez (offgoing nurse). Report included the following information SBAR, Kardex and MAR.

## 2019-05-09 NOTE — PROGRESS NOTES
[facility] at [address] 
Daily Progress Note Admit Date: 2019 Post-Operative Day: 2 from cholecystostomy tube placement Subjective:  
 
Last 24 hrs: Doing better today. Able to eat a little bit, she is having some nausea. C diff pending. WBC 13.1, slightly increased, afebrile. On azithromycin and zosyn. She got OOB to chair for a few hours today. + BM today. Objective:  
 
Blood pressure 135/78, pulse (!) 101, temperature 97.5 °F (36.4 °C), resp. rate 16, height 5' 3.74\" (1.619 m), weight 65.2 kg (143 lb 11.8 oz), SpO2 93 %. Temp (24hrs), Av.9 °F (36.6 °C), Min:97.5 °F (36.4 °C), Max:98.6 °F (37 °C) 
 
 
_____________________ Physical Exam:    
Alert and Oriented, sitting up in bed, no acute distress. Cardiovascular: tachy, no peripheral edema Lungs:fine crackles bilateral bases. Abdomen: + BS, soft, NT. Cholecystostomy tube in place with bilious drainage, 185 mL out yesterday. Assessment:  
Active Problems: 
  Altered mental status (2019) 
 
acute cholecystitis, s/p cholecystostomy tube Plan:  
 
Continue abx PO as tolerated Tube should remain in place 3-5 weeks per IR guidelines. Will need cholangiogram prior to removing cholecystotomy tube. Following [facility] [address] [facility] at [address], [address] [place], [place] 
[phone] Data Review: 
 
Recent Labs 19 
0240 19 
0905 19 
1113 WBC 13.1* 12.7* 24.8* HGB 11.0* 10.6* 13.3 HCT 33.6* 34.7* 41.0  
 215 286 Recent Labs 19 
0240 19 
2192 19 
1113 * 144 142  
K 3.2* 2.9* 3.3*  
* 114* 109* CO2 23 26 23 GLU 79 84 179* BUN 28* 33* 39* CREA 0.80 0.91 1.10* CA 8.3* 8.4* 9.1 MG  --   --  2.3 ALB 2.2*  --  2.7* TBILI 0.8  --  0.6 SGOT 31  --  38* ALT 32  --  33 No results for input(s): AML, LPSE in the last 72 hours. ______________________ Medications: 
 
Current Facility-Administered Medications Medication Dose Route Frequency  losartan (COZAAR) tablet 100 mg  100 mg Oral DAILY  hydroCHLOROthiazide (HYDRODIURIL) tablet 25 mg  25 mg Oral DAILY  ondansetron (ZOFRAN) injection 4 mg  4 mg IntraVENous Q8H PRN  
 albuterol-ipratropium (DUO-NEB) 2.5 MG-0.5 MG/3 ML  3 mL Nebulization Q6H PRN  
 azithromycin (ZITHROMAX) tablet 250 mg  250 mg Oral DAILY  pantoprazole (PROTONIX) tablet 40 mg  40 mg Oral ACB  ethambutol (MYAMBUTOL) tablet 800 mg  800 mg Oral DAILY  lactobac ac& pc-s.therm-b.anim (MANI Q/RISAQUAD)  1 Cap Oral DAILY  sodium chloride (NS) flush 5-40 mL  5-40 mL IntraVENous Q8H  
 sodium chloride (NS) flush 5-40 mL  5-40 mL IntraVENous PRN  
 0.9% sodium chloride infusion  75 mL/hr IntraVENous CONTINUOUS  
 acetaminophen (TYLENOL) suppository 650 mg  650 mg Rectal Q4H PRN  
 heparin (porcine) injection 5,000 Units  5,000 Units SubCUTAneous Q8H  piperacillin-tazobactam (ZOSYN) 3.375 g in 0.9% sodium chloride (MBP/ADV) 100 mL  3.375 g IntraVENous Q8H  
 arformoterol (BROVANA) neb solution 15 mcg  15 mcg Nebulization BID RT And  
 budesonide (PULMICORT) 500 mcg/2 ml nebulizer suspension  500 mcg Nebulization BID RT I have independently examined the patient and have reviewed the chart. I agree with the above plan. Patient having diarrhea today. Getting checked for C. Diff. Otherwise feels ok. No complaints of abdominal pain. Afebrile. WBC up a little. No growth seen from cholecystostomy drain fluid. Urine culture +. Will get CT with contrast tomorrow to better assess for any liver abscess. This will help determine duration of antibiotic therapy required. Cholecystostomy tube will need to stay in place for 4 weeks and will need a tube cholangiogram to show patency of cystic duct prior to removal.    
 
Gayatri Nash MD 
5/9/2019 5:14 PM

## 2019-05-09 NOTE — PROGRESS NOTES
Bedside shift change report given to Lisa Meckel (oncoming nurse) by John Taylor  (offgoing nurse). Report included the following information SBAR.

## 2019-05-09 NOTE — PROGRESS NOTES
Hospitalist Progress Note [address] [facility]  Answering service: [phone] OR [phone] from in house phone Date of Service:  2019 NAME:  Williams Ellis :  1924 MRN:  [id number] Admission Summary:  
80year old female presenting with altered mental status and found to have concern for cholecystitis. Interval history / Subjective:  
Patient seen and examined. Doing well, WBC trending down. Continues on broad spectrum antibiotics. Urine culture with GNR. PT/OT consulted. Cholecystotomy cultures negative. Addendum: restarted home blood pressure medications Assessment & Plan:  
 
Sepsis, secondary to cholecystitis with fluid collection:  
-CT abd/pelvis with 9.4 x 5.2 x 5 cm collection  
-s/p IR cholecystotomy tube 
-General surgery following, will need to keep in place for couple weeks, follow up 3 weeks. Cholangiogram as outpatient.  
-continue zosyn for now, follow final cultures. Transition to oral antibiotics pending clinical improvement Acute hypoxic respiratory failure:  
-wean oxygen Pneumonia: continue antibiotics as above Possible UTI: follow final cultures, GNR preliminary Hypokalemia: replace as needed History of MAC: continue home ethambutol, azithromycin Dementia: supportive care Hypertension:  Losartan, HCTZ Code status: dnr 
DVT prophylaxis: heparin Care Plan discussed with: Patient/Family Disposition: TBD Hospital Problems  Date Reviewed: 2019 Codes Class Noted POA Altered mental status ICD-10-CM: R41.82 
ICD-9-CM: 780.97  2019 Unknown Review of Systems:  
Unable to obtain accurate ROS Vital Signs:  
 Last 24hrs VS reviewed since prior progress note. Most recent are: 
Visit Vitals /78 Pulse (!) 101 Temp 97.5 °F (36.4 °C) Resp 16 Ht 5' 3.74\" (1.619 m) Wt 65.2 kg (143 lb 11.8 oz) SpO2 93% BMI 24.87 kg/m² Intake/Output Summary (Last 24 hours) at 5/9/2019 1534 Last data filed at 5/9/2019 4860 Gross per 24 hour Intake  Output 180 ml Net -180 ml Physical Examination:  
 
 
     
Constitutional:  No acute distress, cooperative, pleasant   
ENT:  Oral mucous moist, oropharynx benign. Neck supple, Resp:  CTA bilaterally. No wheezing/rhonchi/rales. No accessory muscle use CV:  Regular rhythm, normal rate, no murmurs, gallops, rubs GI:  right cholecystomy tube in place, Soft, non distended, non tender. normoactive bowel sounds Musculoskeletal:  No edema, warm, 2+ pulses throughout Neurologic:  Moves all extremities. Data Review:  
 Review and/or order of clinical lab test 
 
 
Labs:  
 
Recent Labs 05/09/19 
0240 05/08/19 
4036 WBC 13.1* 12.7* HGB 11.0* 10.6* HCT 33.6* 34.7*  
 215 Recent Labs 05/09/19 
0240 05/08/19 
5965 05/07/19 
1113 * 144 142  
K 3.2* 2.9* 3.3*  
* 114* 109* CO2 23 26 23 BUN 28* 33* 39* CREA 0.80 0.91 1.10* GLU 79 84 179* CA 8.3* 8.4* 9.1 MG  --   --  2.3 Recent Labs 05/09/19 
0240 05/07/19 
1113 SGOT 31 38* ALT 32 33 AP 56 67 TBILI 0.8 0.6 TP 5.4* 6.5 ALB 2.2* 2.7*  
GLOB 3.2 3.8 No results for input(s): INR, PTP, APTT in the last 72 hours. No lab exists for component: INREXT, INREXT No results for input(s): FE, TIBC, PSAT, FERR in the last 72 hours. No results found for: FOL, RBCF No results for input(s): PH, PCO2, PO2 in the last 72 hours. Recent Labs 05/07/19 
1113 TROIQ <0.05 Lab Results Component Value Date/Time Cholesterol, total 208 (H) 05/11/2009 12:15 PM  
 HDL Cholesterol 90 (H) 05/11/2009 12:15 PM  
 LDL, calculated 107.6 (H) 05/11/2009 12:15 PM  
 Triglyceride 52 05/11/2009 12:15 PM  
 CHOL/HDL Ratio 2.3 05/11/2009 12:15 PM  
 
Lab Results Component Value Date/Time Glucose (POC) 183 (H) 05/07/2019 10:57 AM  
 
Lab Results Component Value Date/Time Color YELLOW/STRAW 05/07/2019 11:42 AM  
 Appearance TURBID (A) 05/07/2019 11:42 AM  
 Specific gravity 1.021 05/07/2019 11:42 AM  
 pH (UA) 5.5 05/07/2019 11:42 AM  
 Protein 100 (A) 05/07/2019 11:42 AM  
 Glucose NEGATIVE  05/07/2019 11:42 AM  
 Ketone NEGATIVE  05/07/2019 11:42 AM  
 Bilirubin NEGATIVE  05/07/2019 11:42 AM  
 Urobilinogen 0.2 05/07/2019 11:42 AM  
 Nitrites POSITIVE (A) 05/07/2019 11:42 AM  
 Leukocyte Esterase LARGE (A) 05/07/2019 11:42 AM  
 Epithelial cells FEW 05/07/2019 11:42 AM  
 Bacteria 2+ (A) 05/07/2019 11:42 AM  
 WBC >100 (H) 05/07/2019 11:42 AM  
 RBC 5-10 05/07/2019 11:42 AM  
 
 
 
Medications Reviewed:  
 
Current Facility-Administered Medications Medication Dose Route Frequency  losartan (COZAAR) tablet 100 mg  100 mg Oral DAILY  hydroCHLOROthiazide (HYDRODIURIL) tablet 25 mg  25 mg Oral DAILY  ondansetron (ZOFRAN) injection 4 mg  4 mg IntraVENous Q8H PRN  
 albuterol-ipratropium (DUO-NEB) 2.5 MG-0.5 MG/3 ML  3 mL Nebulization Q6H PRN  
 azithromycin (ZITHROMAX) tablet 250 mg  250 mg Oral DAILY  pantoprazole (PROTONIX) tablet 40 mg  40 mg Oral ACB  ethambutol (MYAMBUTOL) tablet 800 mg  800 mg Oral DAILY  lactobac ac& pc-s.therm-b.anim (MANI Q/RISAQUAD)  1 Cap Oral DAILY  sodium chloride (NS) flush 5-40 mL  5-40 mL IntraVENous Q8H  
 sodium chloride (NS) flush 5-40 mL  5-40 mL IntraVENous PRN  
 0.9% sodium chloride infusion  75 mL/hr IntraVENous CONTINUOUS  
 acetaminophen (TYLENOL) suppository 650 mg  650 mg Rectal Q4H PRN  
 heparin (porcine) injection 5,000 Units  5,000 Units SubCUTAneous Q8H  piperacillin-tazobactam (ZOSYN) 3.375 g in 0.9% sodium chloride (MBP/ADV) 100 mL  3.375 g IntraVENous Q8H  
 arformoterol (BROVANA) neb solution 15 mcg  15 mcg Nebulization BID RT  And  
 budesonide (PULMICORT) 500 mcg/2 ml nebulizer suspension  500 mcg Nebulization BID RT  
 
 ______________________________________________________________________ EXPECTED LENGTH OF STAY: 4d 19h ACTUAL LENGTH OF STAY:          2 Ken Wilks DO

## 2019-05-09 NOTE — PROGRESS NOTES
Problem: Mobility Impaired (Adult and Pediatric) Goal: *Acute Goals and Plan of Care (Insert Text) Description Physical Therapy Goals Initiated 5/9/2019 1. Patient will move from supine to sit and sit to supine  and roll side to side in bed with modified independence within 7 day(s). 2.  Patient will transfer from bed to chair and chair to bed with supervision/set-up using the least restrictive device within 7 day(s). 3.  Patient will perform sit to stand with supervision/set-up within 7 day(s). 4.  Patient will ambulate with supervision/set-up for 150 feet with the least restrictive device within 7 day(s). Outcome: Progressing Towards Goal 
 PHYSICAL THERAPY EVALUATION Patient: Terell Ochoa (31 y.o. female) Date: 5/9/2019 Primary Diagnosis: Altered mental status [R41.82] Precautions:   Contact, Fall, Bed Alarm ASSESSMENT : 
Based on the objective data described below, the patient presents with decreased mobility and balance after being admitted from [facility] with possible cholecystitis and sepsis after being found unresponsive. She had cholecystostomy drain placed and now denies any abdominal pain. She does have history of dementia, but daughter was available to provide history. She reports that patient was ambulatory with rollator prior to admission and was even walking several blocks of distance several times a week. Patient is cooperative but easily distracted and fearful of falling. She was able to transfer to the chair with mod assist, but was mostly preoccupied with eating her lunch. Recommend return to healthcare at [facility] when medically ready since she needs to be more ambulatory in order to return to memory care unit. Patient will benefit from skilled intervention to address the above impairments. Patient?s rehabilitation potential is considered to be Good Factors which may influence rehabilitation potential include:  
? None noted ?         Mental ability/status ? Medical condition ? Home/family situation and support systems ? Safety awareness 
? Pain tolerance/management 
? Other: PLAN : 
Recommendations and Planned Interventions: 
?           Bed Mobility Training             ? Neuromuscular Re-Education ? Transfer Training                   ? Orthotic/Prosthetic Training 
? Gait Training                         ? Modalities ? Therapeutic Exercises           ? Edema Management/Control ? Therapeutic Activities            ? Patient and Family Training/Education ? Other (comment): Frequency/Duration: Patient will be followed by physical therapy  5 times a week to address goals. Discharge Recommendations: Clifford Trejo Further Equipment Recommendations for Discharge: none SUBJECTIVE:  
Patient stated ? I am hungry, I want to eat. ? OBJECTIVE DATA SUMMARY:  
HISTORY:   
Past Medical History:  
Diagnosis Date Bronchiectasis ([facility] [address] Cancer ([facility] [address] SCC Dementia Gastrointestinal disorder   
 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   
 microbacterium isidra- intracellular lung disease PNA (pneumonia) Vertigo Past Surgical History:  
Procedure Laterality Date ABDOMEN SURGERY PROC UNLISTED    
 colon tumor removed 1982 HX COLECTOMY [facility] PARTIAL COLON RESECTION  
 HX HEENT    
 AS A CHILD  
 HX UROLOGICAL    
 bladder sling 2007 IR CHOLECYSTOSTOMY PERCUTANEOUS  5/7/2019 MO INS NEW/RPLCMT PRM PM W/TRANSV ELTRD ATRIAL&VENT  12/13/2017 Prior Level of Function/Home Situation: ambulatory at [facility] with rollator Personal factors and/or comorbidities impacting plan of care: dementia, generalized weakness, poor balance EXAMINATION/PRESENTATION/DECISION MAKING:  
Critical Behavior: 
Neurologic State: Confused Orientation Level: Disoriented to place, Disoriented to situation, Disoriented to time Cognition: Follows commands, Decreased attention/concentration, Impaired decision making, Poor safety awareness, Memory loss Hearing: 
  
Skin:   
Edema: none Range Of Motion: 
AROM: Within functional limits Strength:   
Strength: Generally decreased, functional 
  
  
  
  
  
  
Tone & Sensation:  
Tone: Normal 
  
  
  
  
Sensation: Intact Coordination: 
Coordination: Generally decreased, functional 
Vision:  
  
Functional Mobility: 
Bed Mobility: 
  
  
Sit to Supine: Moderate assistance Scooting: (up in chair after) Transfers: 
Sit to Stand: Moderate assistance; Additional time Stand to Sit: Moderate assistance; Additional time Stand Pivot Transfers: Moderate assistance Balance:  
Sitting: Intact; Without support Standing: Impaired; With support Standing - Static: Poor;Constant support Standing - Dynamic : Poor Ambulation/Gait Training: 
Distance (ft): 2 Feet (ft) Assistive Device: (attempted walker, but she was unable to use it ) Ambulation - Level of Assistance: Moderate assistance Gait Abnormalities: Decreased step clearance;Shuffling gait Base of Support: Widened;Center of gravity altered Speed/Yady: Pace decreased (<100 feet/min); Shuffled Step Length: Right shortened;Left shortened Stairs: Therapeutic Exercises:  
 
 
Functional Measure: 
Barthel Index: 
Bathin Bladder: 5 Bowels: 5 Groomin Dressin Feeding: 10 Mobility: 0 Stairs: 0 Toilet Use: 0 Transfer (Bed to Chair and Back): 5 Total: 25/100 Percentage of impairment  
0% 1-19% 20-39% 40-59% 60-79% 80-99% 100%  
Barthel Score 0-100 100 99-80 79-60 59-40 20-39 1-19 
 0 The Barthel ADL Index: Guidelines 1. The index should be used as a record of what a patient does, not as a record of what a patient could do. 2. The main aim is to establish degree of independence from any help, physical or verbal, however minor and for whatever reason. 3. The need for supervision renders the patient not independent. 4. A patient's performance should be established using the best available evidence. Asking the patient, friends/relatives and nurses are the usual sources, but direct observation and common sense are also important. However direct testing is not needed. 5. Usually the patient's performance over the preceding 24-48 hours is important, but occasionally longer periods will be relevant. 6. Middle categories imply that the patient supplies over 50 per cent of the effort. 7. Use of aids to be independent is allowed. Stella Tolliver., Barthel, D.W. (8268). Functional evaluation: the Barthel Index. [facility] (14)2. MABEL Chacko, [facility]., [facility] [place], [facility] (1999). Measuring the change indisability after inpatient rehabilitation; comparison of the responsiveness of the Barthel Index and Functional [place] Measure. Journal of Neurology, Neurosurgery, and Psychiatry, 66(4), 600-005. Armaan Hopkins NDimitriJ.A, RAND Matt, & Margy Verma, M.A. (2004.) Assessment of post-stroke quality of life in cost-effectiveness studies: The usefulness of the Barthel Index and the EuroQoL-5D. [facility], 13, 452-49 Physical Therapy Evaluation Charge Determination History Examination Presentation Decision-Making HIGH Complexity :3+ comorbidities / personal factors will impact the outcome/ POC  MEDIUM Complexity : 3 Standardized tests and measures addressing body structure, function, activity limitation and / or participation in recreation  LOW Complexity : Stable, uncomplicated  LOW Complexity : FOTO score of  Based on the above components, the patient evaluation is determined to be of the following complexity level: LOW Pain: 
Pain Scale 1: Numeric (0 - 10) Pain Intensity 1: 0 Activity Tolerance:  
Good Please refer to the flowsheet for vital signs taken during this treatment. After treatment:  
?         Patient left in no apparent distress sitting up in chair ? Patient left in no apparent distress in bed 
? Call bell left within reach ? Nursing notified ? Caregiver present ? Bed alarm activated COMMUNICATION/EDUCATION:  
The patient?s plan of care was discussed with: Registered Nurse. ?         Fall prevention education was provided and the patient/caregiver indicated understanding. ? Patient/family have participated as able in goal setting and plan of care. ?         Patient/family agree to work toward stated goals and plan of care. ?         Patient understands intent and goals of therapy, but is neutral about his/her participation. ? Patient is unable to participate in goal setting and plan of care. Thank you for this referral. 
Kranthi Hernandez, PT Time Calculation: 21 mins

## 2019-05-09 NOTE — PROGRESS NOTES
Chart reviewed for transitions of care, noted transfer to this floor. Cm discussed with attending the possibility of patient being discharged tomorrow (Friday). Cm contacted Zoe Bean ([phone]) SW with [facility]. They have a room reserved in their health care unit for this patient. Joann Rodriguez asked that I send her notes through [employer] which I have done. Cm did advise Joann Rodriguez that patient has a cholecystotomy tube and it will need to be in place for 2 weeks. Cm will continue to follow and assist with d/c as needed.  
Agusto SIDDIQUI, ACM

## 2019-05-09 NOTE — PROGRESS NOTES
NUTRITION COMPLETE ASSESSMENT 
 
RECOMMENDATIONS:  
1. Assist and encourage PO intake with meals 2. Add imodium PRN if loose stools continue Interventions/Plan:  
Food/Nutrient Delivery:  (preferences) Commercial supplement(see below) Assessment:  
Reason for Assessment: [x] Provider Consult Diet: (GI lite) Supplements: none Nutritionally Significant Medications: [x] Reviewed & Includes: azithromycin, HCTZ, ranjith-Q, protonix, zosyn, KCl, NS @ 75ml/hr; PRN: zofran Meal Intake:  
Patient Vitals for the past 100 hrs: 
 % Diet Eaten 05/08/19 0902 20 % Pre-Hospitalization: 
Usual Appetite: Good Diet at Home: regular Vitamins/Supplements: No 
 
Current Hospitalization:  
Appetite: Fair PO Ability: With assist Average po intake: Average supplements intake:    
 Subjective: She might do better with foods she can  - daughter Objective: 
Pt admitted for AMS from [facility]. PMHx: GERD, HTN, PNA, COPD, MAC,  colon tumor s/p partial colon resection. Constipation and abd pain prior to admit with poor appetite. Got IVF prior to admit. S/p cholecystostomy tube yesterday. Plans to leave in place for a couple weeks. Possible d/c tomorrow. Spoke with pt and daughter. Pt poor historian. Daughter notes primarily good intake PTA beyond acute symptoms. Food preferences taken and order placed. May do better with finger foods so added to order along with supplements. Magic Cup (290kcal, 9g protein), Boost Pudding (240kcal, 7g protein), Ensure Clear (240kcal, 8g protein). Predict that with kinsey tube in place abd pain and intake will continue to improve Will follow for PO intake/supplement acceptance if remains as inpatient. Intake can be continued to be monitored at facility after d/c. Estimated Nutrition Needs:  
Kcals/day: 1100 Kcals/day(1100-1192kcal) Protein:   
Fluid: 1200 ml(1ml/kcal) Based On: [place] Wilkinson(1.2-1.3) Weight Used: IBW(54.5kg) Pt expected to meet estimated nutrient needs:  []   Yes     []  No [x] Unable to predict at this time Nutrition Diagnosis:  
1. Inadequate oral intake related to altered GI fx as evidenced by cholecystitis; poor PO PTA Goals:   
 Consumption of at least 50% meals and 1-2 supplements/day in 5-7 days Monitoring & Evaluation: - Total energy intake, Protein intake - Weight/weight change, GI 
 
Previous Nutrition Goals Met:   N/A Previous Recommendations:    N/A Education & Discharge Needs: 
 [] None Identified 
 [x] Identified and addressed [x] Participated in care plan, discharge planning, and/or interdisciplinary rounds Cultural, Advent and ethnic food preferences identified: None Skin Integrity: [x]Intact  []Other Edema: [x]None []Other Last BM: 5/9 Food Allergies: [x]None []Other Diet Restrictions: Cultural/Christian Preference(s): None Anthropometrics:   
Weight Loss Metrics 5/8/2019 2/19/2019 2/5/2019 4/19/2018 12/14/2017 1/27/2016 1/21/2016 Today's Wt 143 lb 11.8 oz 125 lb 125 lb 6.4 oz 125 lb 121 lb 11.1 oz 116 lb 8 oz -  
BMI 24.87 kg/m2 21.62 kg/m2 21.69 kg/m2 20.8 kg/m2 20.25 kg/m2 - 19.39 kg/m2 Weight Source: Bed Height: 5' 3.74\" (161.9 cm)(From documentation on 2/19/19), Body mass index is 24.87 kg/m². IBW : 54.4 kg (120 lb), % IBW (Calculated): 119.78 % 
 ,   
 
Labs:   
Lab Results Component Value Date/Time Sodium 146 (H) 05/09/2019 02:40 AM  
 Potassium 3.2 (L) 05/09/2019 02:40 AM  
 Chloride 115 (H) 05/09/2019 02:40 AM  
 CO2 23 05/09/2019 02:40 AM  
 Glucose 79 05/09/2019 02:40 AM  
 BUN 28 (H) 05/09/2019 02:40 AM  
 Creatinine 0.80 05/09/2019 02:40 AM  
 Calcium 8.3 (L) 05/09/2019 02:40 AM  
 Magnesium 2.3 05/07/2019 11:13 AM  
 Albumin 2.2 (L) 05/09/2019 02:40 AM  
 
Evan Magallanes RD Pager #[phone] or [phone]

## 2019-05-10 ENCOUNTER — APPOINTMENT (OUTPATIENT)
Dept: CT IMAGING | Age: 84
DRG: 871 | End: 2019-05-10
Attending: SURGERY
Payer: MEDICARE

## 2019-05-10 LAB
ALBUMIN SERPL-MCNC: 2.1 G/DL (ref 3.5–5)
ALBUMIN/GLOB SERPL: 0.7 {RATIO} (ref 1.1–2.2)
ALP SERPL-CCNC: 60 U/L (ref 45–117)
ALT SERPL-CCNC: 25 U/L (ref 12–78)
ANION GAP SERPL CALC-SCNC: 7 MMOL/L (ref 5–15)
AST SERPL-CCNC: 23 U/L (ref 15–37)
BILIRUB SERPL-MCNC: 0.5 MG/DL (ref 0.2–1)
BUN SERPL-MCNC: 17 MG/DL (ref 6–20)
BUN/CREAT SERPL: 23 (ref 12–20)
C DIFF GDH STL QL: NEGATIVE
C DIFF TOX A+B STL QL IA: NEGATIVE
CALCIUM SERPL-MCNC: 8 MG/DL (ref 8.5–10.1)
CHLORIDE SERPL-SCNC: 110 MMOL/L (ref 97–108)
CO2 SERPL-SCNC: 25 MMOL/L (ref 21–32)
CREAT SERPL-MCNC: 0.74 MG/DL (ref 0.55–1.02)
ERYTHROCYTE [DISTWIDTH] IN BLOOD BY AUTOMATED COUNT: 12.6 % (ref 11.5–14.5)
GLOBULIN SER CALC-MCNC: 3.2 G/DL (ref 2–4)
GLUCOSE SERPL-MCNC: 90 MG/DL (ref 65–100)
HCT VFR BLD AUTO: 34.2 % (ref 35–47)
HGB BLD-MCNC: 11 G/DL (ref 11.5–16)
INTERPRETATION: NORMAL
MCH RBC QN AUTO: 29.6 PG (ref 26–34)
MCHC RBC AUTO-ENTMCNC: 32.2 G/DL (ref 30–36.5)
MCV RBC AUTO: 92.2 FL (ref 80–99)
NRBC # BLD: 0 K/UL (ref 0–0.01)
NRBC BLD-RTO: 0 PER 100 WBC
PLATELET # BLD AUTO: 262 K/UL (ref 150–400)
PMV BLD AUTO: 10.5 FL (ref 8.9–12.9)
POTASSIUM SERPL-SCNC: 3 MMOL/L (ref 3.5–5.1)
PROT SERPL-MCNC: 5.3 G/DL (ref 6.4–8.2)
RBC # BLD AUTO: 3.71 M/UL (ref 3.8–5.2)
SODIUM SERPL-SCNC: 142 MMOL/L (ref 136–145)
WBC # BLD AUTO: 11.2 K/UL (ref 3.6–11)

## 2019-05-10 PROCEDURE — 74011250636 HC RX REV CODE- 250/636: Performed by: INTERNAL MEDICINE

## 2019-05-10 PROCEDURE — 36415 COLL VENOUS BLD VENIPUNCTURE: CPT

## 2019-05-10 PROCEDURE — 74011000258 HC RX REV CODE- 258: Performed by: INTERNAL MEDICINE

## 2019-05-10 PROCEDURE — 85027 COMPLETE CBC AUTOMATED: CPT

## 2019-05-10 PROCEDURE — 97535 SELF CARE MNGMENT TRAINING: CPT

## 2019-05-10 PROCEDURE — 74170 CT ABD WO CNTRST FLWD CNTRST: CPT

## 2019-05-10 PROCEDURE — 80053 COMPREHEN METABOLIC PANEL: CPT

## 2019-05-10 PROCEDURE — 74011250637 HC RX REV CODE- 250/637: Performed by: INTERNAL MEDICINE

## 2019-05-10 PROCEDURE — 97530 THERAPEUTIC ACTIVITIES: CPT

## 2019-05-10 PROCEDURE — 74011000258 HC RX REV CODE- 258: Performed by: RADIOLOGY

## 2019-05-10 PROCEDURE — 74011636320 HC RX REV CODE- 636/320: Performed by: RADIOLOGY

## 2019-05-10 PROCEDURE — 94640 AIRWAY INHALATION TREATMENT: CPT

## 2019-05-10 PROCEDURE — 74011000250 HC RX REV CODE- 250: Performed by: INTERNAL MEDICINE

## 2019-05-10 PROCEDURE — 65660000000 HC RM CCU STEPDOWN

## 2019-05-10 RX ORDER — SODIUM CHLORIDE 0.9 % (FLUSH) 0.9 %
10 SYRINGE (ML) INJECTION
Status: COMPLETED | OUTPATIENT
Start: 2019-05-10 | End: 2019-05-10

## 2019-05-10 RX ADMIN — PIPERACILLIN AND TAZOBACTAM 3.38 G: 3; .375 INJECTION, POWDER, FOR SOLUTION INTRAVENOUS at 09:44

## 2019-05-10 RX ADMIN — ETHAMBUTOL HYDROCHLORIDE 800 MG: 400 TABLET, FILM COATED ORAL at 19:07

## 2019-05-10 RX ADMIN — Medication 10 ML: at 17:44

## 2019-05-10 RX ADMIN — PIPERACILLIN AND TAZOBACTAM 3.38 G: 3; .375 INJECTION, POWDER, FOR SOLUTION INTRAVENOUS at 02:24

## 2019-05-10 RX ADMIN — ARFORMOTEROL TARTRATE 15 MCG: 15 SOLUTION RESPIRATORY (INHALATION) at 20:25

## 2019-05-10 RX ADMIN — Medication 1 CAPSULE: at 09:36

## 2019-05-10 RX ADMIN — SODIUM CHLORIDE 100 ML: 900 INJECTION, SOLUTION INTRAVENOUS at 17:44

## 2019-05-10 RX ADMIN — Medication 10 ML: at 06:46

## 2019-05-10 RX ADMIN — IOPAMIDOL 100 ML: 755 INJECTION, SOLUTION INTRAVENOUS at 17:44

## 2019-05-10 RX ADMIN — HYDROCHLOROTHIAZIDE 25 MG: 25 TABLET ORAL at 09:36

## 2019-05-10 RX ADMIN — HEPARIN SODIUM 5000 UNITS: 5000 INJECTION INTRAVENOUS; SUBCUTANEOUS at 02:24

## 2019-05-10 RX ADMIN — PIPERACILLIN AND TAZOBACTAM 3.38 G: 3; .375 INJECTION, POWDER, FOR SOLUTION INTRAVENOUS at 18:31

## 2019-05-10 RX ADMIN — ARFORMOTEROL TARTRATE 15 MCG: 15 SOLUTION RESPIRATORY (INHALATION) at 07:23

## 2019-05-10 RX ADMIN — HEPARIN SODIUM 5000 UNITS: 5000 INJECTION INTRAVENOUS; SUBCUTANEOUS at 09:48

## 2019-05-10 RX ADMIN — Medication 10 ML: at 14:24

## 2019-05-10 RX ADMIN — AZITHROMYCIN 250 MG: 250 TABLET, FILM COATED ORAL at 09:37

## 2019-05-10 RX ADMIN — BUDESONIDE 500 MCG: 0.5 INHALANT RESPIRATORY (INHALATION) at 20:25

## 2019-05-10 RX ADMIN — BUDESONIDE 500 MCG: 0.5 INHALANT RESPIRATORY (INHALATION) at 07:23

## 2019-05-10 RX ADMIN — Medication 10 ML: at 22:00

## 2019-05-10 RX ADMIN — LOSARTAN POTASSIUM 100 MG: 50 TABLET ORAL at 09:34

## 2019-05-10 RX ADMIN — HEPARIN SODIUM 5000 UNITS: 5000 INJECTION INTRAVENOUS; SUBCUTANEOUS at 19:12

## 2019-05-10 NOTE — PROGRESS NOTES
Bedside shift change report given to 1615 Lilliam Zaman (oncoming nurse) by Jasmin Hinojosa (offgoing nurse). Report included the following information SBAR, Kardex and MAR.

## 2019-05-10 NOTE — PROGRESS NOTES
Hospitalist Progress Note [facility] [facility] Answering service: [phone] OR [phone] from in house phone Date of Service:  5/10/2019 NAME:  Mary Kate Bucio :  1924 MRN:  [id number] Admission Summary:  
80year old female presenting with altered mental status and found to have concern for cholecystitis. Interval history / Subjective:  
Patient seen and examined. Feels tired. Attempting CT abdomen to evaluate fluid collection. Needs IV per nursing. Discussed with nursing at bedside. Will transition to oral antibiotics tomorrow, , pending CT results. No abdominal pain. Assessment & Plan:  
 
Sepsis, secondary to cholecystitis with fluid collection:  
-CT abd/pelvis with 9.4 x 5.2 x 5 cm collection  
-s/p IR cholecystotomy tube 
-General surgery following, will need to keep in place for couple weeks, follow up 3 weeks. Cholangiogram as outpatient.  
-continue zosyn for now, follow final cultures. Transition to oral antibiotics pending CT scan Acute hypoxic respiratory failure:  
-wean oxygen Pneumonia: continue antibiotics as above Possible UTI: cultures with citrobacter Hypokalemia: replace as needed History of MAC: continue home ethambutol, azithromycin Dementia: supportive care Hypertension:  Losartan, HCTZ Code status: dnr 
DVT prophylaxis: heparin Care Plan discussed with: Patient/Family Disposition: TBD Hospital Problems  Date Reviewed: 2019 Codes Class Noted POA Altered mental status ICD-10-CM: R41.82 
ICD-9-CM: 780.97  2019 Unknown Review of Systems:  
Unable to obtain accurate ROS Vital Signs:  
 Last 24hrs VS reviewed since prior progress note. Most recent are: 
Visit Vitals /90 Pulse 95 Temp 96.5 °F (35.8 °C) Resp 18 Ht 5' 3.74\" (1.619 m) Wt 65.2 kg (143 lb 11.8 oz) SpO2 97% BMI 24.87 kg/m² Intake/Output Summary (Last 24 hours) at 5/10/2019 1718 Last data filed at 5/10/2019 9304 Gross per 24 hour Intake  Output 645 ml Net -645 ml Physical Examination:  
 
 
     
Constitutional:  No acute distress, cooperative, pleasant   
ENT:  Oral mucous moist, oropharynx benign. Neck supple, Resp:  CTA bilaterally. No wheezing/rhonchi/rales. No accessory muscle use CV:  Regular rhythm, normal rate, no murmurs, gallops, rubs GI:  right cholecystomy tube in place, Soft, non distended, non tender Musculoskeletal:  No edema, warm, 2+ pulses throughout Neurologic:  Moves all extremities. Data Review:  
 Review and/or order of clinical lab test 
 
 
Labs:  
 
Recent Labs 05/10/19 
0530 05/09/19 
0240 WBC 11.2* 13.1* HGB 11.0* 11.0*  
HCT 34.2* 33.6*  249 Recent Labs 05/10/19 
0530 05/09/19 
0240 05/08/19 
0412  146* 144  
K 3.0* 3.2* 2.9*  
* 115* 114* CO2 25 23 26 BUN 17 28* 33* CREA 0.74 0.80 0.91  
GLU 90 79 84  
CA 8.0* 8.3* 8.4* Recent Labs 05/10/19 
0530 05/09/19 
0240 SGOT 23 31 ALT 25 32 AP 60 56 TBILI 0.5 0.8 TP 5.3* 5.4* ALB 2.1* 2.2*  
GLOB 3.2 3.2 No results for input(s): INR, PTP, APTT in the last 72 hours. No lab exists for component: INREXT, INREXT No results for input(s): FE, TIBC, PSAT, FERR in the last 72 hours. No results found for: FOL, RBCF No results for input(s): PH, PCO2, PO2 in the last 72 hours. No results for input(s): CPK, CKNDX, TROIQ in the last 72 hours. No lab exists for component: CPKMB Lab Results Component Value Date/Time Cholesterol, total 208 (H) 05/11/2009 12:15 PM  
 HDL Cholesterol 90 (H) 05/11/2009 12:15 PM  
 LDL, calculated 107.6 (H) 05/11/2009 12:15 PM  
 Triglyceride 52 05/11/2009 12:15 PM  
 CHOL/HDL Ratio 2.3 05/11/2009 12:15 PM  
 
Lab Results Component Value Date/Time Glucose (POC) 183 (H) 05/07/2019 10:57 AM  
 
Lab Results Component Value Date/Time Color YELLOW/STRAW 05/07/2019 11:42 AM  
 Appearance TURBID (A) 05/07/2019 11:42 AM  
 Specific gravity 1.021 05/07/2019 11:42 AM  
 pH (UA) 5.5 05/07/2019 11:42 AM  
 Protein 100 (A) 05/07/2019 11:42 AM  
 Glucose NEGATIVE  05/07/2019 11:42 AM  
 Ketone NEGATIVE  05/07/2019 11:42 AM  
 Bilirubin NEGATIVE  05/07/2019 11:42 AM  
 Urobilinogen 0.2 05/07/2019 11:42 AM  
 Nitrites POSITIVE (A) 05/07/2019 11:42 AM  
 Leukocyte Esterase LARGE (A) 05/07/2019 11:42 AM  
 Epithelial cells FEW 05/07/2019 11:42 AM  
 Bacteria 2+ (A) 05/07/2019 11:42 AM  
 WBC >100 (H) 05/07/2019 11:42 AM  
 RBC 5-10 05/07/2019 11:42 AM  
 
 
 
Medications Reviewed:  
 
Current Facility-Administered Medications Medication Dose Route Frequency  sodium chloride 0.9 % bolus infusion 100 mL  100 mL IntraVENous RAD ONCE  
 iopamidol (ISOVUE-370) 76 % injection 100 mL  100 mL IntraVENous RAD ONCE  
 sodium chloride (NS) flush 10 mL  10 mL IntraVENous RAD ONCE  
 losartan (COZAAR) tablet 100 mg  100 mg Oral DAILY  hydroCHLOROthiazide (HYDRODIURIL) tablet 25 mg  25 mg Oral DAILY  ondansetron (ZOFRAN) injection 4 mg  4 mg IntraVENous Q8H PRN  
 albuterol-ipratropium (DUO-NEB) 2.5 MG-0.5 MG/3 ML  3 mL Nebulization Q6H PRN  
 azithromycin (ZITHROMAX) tablet 250 mg  250 mg Oral DAILY  pantoprazole (PROTONIX) tablet 40 mg  40 mg Oral ACB  ethambutol (MYAMBUTOL) tablet 800 mg  800 mg Oral DAILY  lactobac ac& pc-s.therm-b.anim (MANI Q/RISAQUAD)  1 Cap Oral DAILY  sodium chloride (NS) flush 5-40 mL  5-40 mL IntraVENous Q8H  
 sodium chloride (NS) flush 5-40 mL  5-40 mL IntraVENous PRN  
 0.9% sodium chloride infusion  75 mL/hr IntraVENous CONTINUOUS  
 acetaminophen (TYLENOL) suppository 650 mg  650 mg Rectal Q4H PRN  
 heparin (porcine) injection 5,000 Units  5,000 Units SubCUTAneous Q8H  piperacillin-tazobactam (ZOSYN) 3.375 g in 0.9% sodium chloride (MBP/ADV) 100 mL  3.375 g IntraVENous Q8H  
  arformoterol (BROVANA) neb solution 15 mcg  15 mcg Nebulization BID RT And  
 budesonide (PULMICORT) 500 mcg/2 ml nebulizer suspension  500 mcg Nebulization BID RT  
 
______________________________________________________________________ EXPECTED LENGTH OF STAY: 4d 19h ACTUAL LENGTH OF STAY:          3 Deanna Mcneal DO

## 2019-05-10 NOTE — PROGRESS NOTES
[facility] 
Daily Progress Note Admit Date: 2019 Post-Operative Day: 3 from cholecystostomy tube placement Subjective:  
 
Last 24 hrs: No complaints. Denies pain. CT postponed b/c she ate breakfast.   
WBC 11.2, trending down. On azithromycin and zosyn, afebrile. C diff was negative. No growth from cholecystostomy drain fluid. Objective:  
 
Blood pressure 145/90, pulse 95, temperature 96.5 °F (35.8 °C), resp. rate 18, height 5' 3.74\" (1.619 m), weight 65.2 kg (143 lb 11.8 oz), SpO2 97 %. Temp (24hrs), Av.7 °F (36.5 °C), Min:96.5 °F (35.8 °C), Max:98.6 °F (37 °C) 
 
 
_____________________ Physical Exam:    
Alert, pleasant, no distress. Cardiovascular: RRR, no peripheral edema Lungs:CTAB Abdomen: soft, NT. Cholecystostomy to gravity drainage. Bilious drainage, 45 mL out yesterday. Assessment:  
Active Problems: 
  Altered mental status (2019) 
 
acute cholecystitis, s/p cholecystostomy tube placement Plan:  
 
Continue abx Keep cholecystostomy tube in place x 4 weeks Cholangiogram to confirm that cystic duct is patent prior to removal 
F/u CT results [facility] [address] Surgery at [address], [place] [address], [address] [place], [place] 
[phone] Data Review: 
 
Recent Labs 05/10/19 
0530 19 
0240 19 
6299 WBC 11.2* 13.1* 12.7* HGB 11.0* 11.0* 10.6* HCT 34.2* 33.6* 34.7*  
 249 215 Recent Labs 05/10/19 
0530 19 
0240 19 
6225  146* 144  
K 3.0* 3.2* 2.9*  
* 115* 114* CO2 25 23 26 GLU 90 79 84 BUN 17 28* 33* CREA 0.74 0.80 0.91  
CA 8.0* 8.3* 8.4* ALB 2.1* 2.2*  --   
TBILI 0.5 0.8  --   
SGOT 23 31  --   
ALT 25 32  -- No results for input(s): AML, LPSE in the last 72 hours. ______________________ Medications: 
 
Current Facility-Administered Medications Medication Dose Route Frequency  sodium chloride 0.9 % bolus infusion 100 mL  100 mL IntraVENous RAD ONCE  
 iopamidol (ISOVUE-370) 76 % injection 100 mL  100 mL IntraVENous RAD ONCE  
 sodium chloride (NS) flush 10 mL  10 mL IntraVENous RAD ONCE  
 losartan (COZAAR) tablet 100 mg  100 mg Oral DAILY  hydroCHLOROthiazide (HYDRODIURIL) tablet 25 mg  25 mg Oral DAILY  ondansetron (ZOFRAN) injection 4 mg  4 mg IntraVENous Q8H PRN  
 albuterol-ipratropium (DUO-NEB) 2.5 MG-0.5 MG/3 ML  3 mL Nebulization Q6H PRN  
 azithromycin (ZITHROMAX) tablet 250 mg  250 mg Oral DAILY  pantoprazole (PROTONIX) tablet 40 mg  40 mg Oral ACB  ethambutol (MYAMBUTOL) tablet 800 mg  800 mg Oral DAILY  lactobac ac& pc-s.therm-b.anim (MANI Q/RISAQUAD)  1 Cap Oral DAILY  sodium chloride (NS) flush 5-40 mL  5-40 mL IntraVENous Q8H  
 sodium chloride (NS) flush 5-40 mL  5-40 mL IntraVENous PRN  
 0.9% sodium chloride infusion  75 mL/hr IntraVENous CONTINUOUS  
 acetaminophen (TYLENOL) suppository 650 mg  650 mg Rectal Q4H PRN  
 heparin (porcine) injection 5,000 Units  5,000 Units SubCUTAneous Q8H  piperacillin-tazobactam (ZOSYN) 3.375 g in 0.9% sodium chloride (MBP/ADV) 100 mL  3.375 g IntraVENous Q8H  
 arformoterol (BROVANA) neb solution 15 mcg  15 mcg Nebulization BID RT And  
 budesonide (PULMICORT) 500 mcg/2 ml nebulizer suspension  500 mcg Nebulization BID RT I have independently examined the patient and have reviewed the chart. I agree with the above plan. CT still pending due to IV issues for contrast. She is feeling well today. No complaints of n/v. No abdominal pain complaints. C. Diff was negative. Cultures from cholecystostomy tube with no growth. Urine culture +. Afeb/VSS. Abd soft, NT, ND. Mirna tube with bilious output, non-purulent. WBC down to 11.2. Continue antibiotics. CT Abd pending to assess for any liver abscess.   If there is an abscess, she will likely need 4 weeks of antibiotics. If no liver abscess, then just treatment for UTI and pneumonia per primary team.  I will see her back as an outpatient to get her set up for tube cholangiogram prior to removing her cholecystostomy tube.   This will need to be in for at least 4 weeks prior to removal.   
 
Sylwia Chavez MD 
5/10/2019 
5:00 PM

## 2019-05-10 NOTE — PROGRESS NOTES
Cm continuing to follow for transitions of care, discussed patient during rounds. Cm informed patient will not be ready for discharge today, as she has pending C-Diff results and potassium is still low. Cm contacted Anamika Francis with [facility] ([phone]) and updated her on the plan. Folder placed on chart in case patient is ready for d/c over the weekend. Cm contacted patients' daughter Nigel Bloch [phone]) to update on the plan.   
Mak Gibson BSW, ACM

## 2019-05-10 NOTE — PROGRESS NOTES
Charted 5/10/19: 
 
Spiritual Care Partner Volunteer visited patient in [address] [place] [address] on 5/9/19. Documented by: Chaplain Singh MDiv, MACE 
[phone]

## 2019-05-10 NOTE — PROGRESS NOTES
Problem: Self Care Deficits Care Plan (Adult) Goal: *Acute Goals and Plan of Care (Insert Text) Description Occupational Therapy Goals Initiated: 5/9/2019 1. Patient will perform grooming with supervision/set-up sitting in chair within 7 day(s). 2.  Patient will perform bathing with mod A from chair within 7 day(s). 3.  Patient will perform upper body dressing and lower body dressing with mod A within 7 day(s). 4.  Patient will perform toilet transfers with mod A within 7 day(s). 5.  Patient will perform all aspects of toileting with min A within 7 day(s). Outcome: Progressing Towards Goal 
 OCCUPATIONAL THERAPY TREATMENT Patient: Trav Noriega (89 y.o. female) Date: 5/10/2019 Diagnosis: Altered mental status [R41.82] <principal problem not specified> Precautions: Contact, Fall, Bed Alarm Chart, occupational therapy assessment, plan of care, and goals were reviewed. ASSESSMENT: 
Patient requires largely MOD A-MIN A for bed mobility and grooming tasks seated EOB today with repetitive verbal cues for sequencing, problem-solving, and heeding through tasks secondary to dementia, generalized weakness, and decreased activity tolerance. Patient's family members receptive to education on importance of BUE and BLE exercise and functional mobility with patient verbalizing understanding. Continue to recommend discharge to Healthcare Unit at [facility] to maximize patient safety and independence with ADL transfers and tasks. Recommend with nursing patient to complete as able in order to maintain strength, endurance and independence: ADLs with supervision/setup, OOB to chair 3x/day and mobilizing to the bathroom for toileting with 1 assist. May benefit from utilization of lift team.Thank you for your assistance. Progression toward goals: 
?       Improving appropriately and progressing toward goals ? Improving slowly and progressing toward goals ?       Not making progress toward goals and plan of care will be adjusted PLAN: 
Patient continues to benefit from skilled intervention to address the above impairments. Continue treatment per established plan of care. Discharge Recommendations:  Clifford Trejo Further Equipment Recommendations for Discharge:  TBD at rehab SUBJECTIVE:  
Patient stated ? Wow I am tired. ? OBJECTIVE DATA SUMMARY:  
Cognitive/Behavioral Status: 
Neurologic State: Confused Orientation Level: Oriented to time;Oriented to place;Oriented to situation Cognition: Follows commands(inconsistent) Perception: Appears intact Perseveration: No perseveration noted Safety/Judgement: Decreased awareness of need for safety;Decreased awareness of need for assistance;Decreased insight into deficits; Decreased awareness of environment Functional Mobility and Transfers for ADLs: 
Bed Mobility: 
Rolling: Moderate assistance Supine to Sit: Maximum assistance(decreased initiation, assist at trunk and LEs from incr HOB) Sit to Supine: Minimum assistance;Assist x1;Additional time Transfers: 
Sit to Stand: Moderate assistance;Assist x1;Additional time Balance: 
Sitting: Impaired; Without support Sitting - Static: Good (unsupported) Sitting - Dynamic: Fair (occasional) Standing: Impaired; With support Standing - Static: Poor;Constant support Standing - Dynamic : Poor ADL Intervention: OT facilitated functional bed mobility for grooming tasks seated EOB with patient tolerating 15 minutes sitting. Patient requiring repetitive verbal cues for sequencing. OT educated family on exercises to be performed over the weekend. Grooming Washing Face: Contact guard assistance(seated EOB with verbal cues for sequencing) Brushing Teeth: Contact guard assistance(seated EOB with verbal cues for sequencing) Cognitive Retraining Safety/Judgement: Decreased awareness of need for safety;Decreased awareness of need for assistance;Decreased insight into deficits; Decreased awareness of environment Activity Tolerance: VSS Please refer to the flowsheet for vital signs taken during this treatment. After treatment:  
? Patient left in no apparent distress sitting up in chair ? Patient left in no apparent distress in bed 
? Call bell left within reach ? Nursing notified ? Caregiver present ? Bed alarm activated COMMUNICATION/COLLABORATION:  
The patient?s plan of care was discussed with: Physical Therapist and Registered Nurse Hoa Haynes Time Calculation: 23 mins

## 2019-05-10 NOTE — ROUTINE PROCESS
Bedside and Verbal shift change report given to Sivakumar Gusman RN (oncoming nurse) by Billie Darling RN (offgoing nurse). Report included the following information SBAR, Kardex, Intake/Output, MAR and Recent Results.

## 2019-05-10 NOTE — PROGRESS NOTES
Problem: Mobility Impaired (Adult and Pediatric) Goal: *Acute Goals and Plan of Care (Insert Text) Description Physical Therapy Goals Initiated 5/9/2019 1. Patient will move from supine to sit and sit to supine  and roll side to side in bed with modified independence within 7 day(s). 2.  Patient will transfer from bed to chair and chair to bed with supervision/set-up using the least restrictive device within 7 day(s). 3.  Patient will perform sit to stand with supervision/set-up within 7 day(s). 4.  Patient will ambulate with supervision/set-up for 150 feet with the least restrictive device within 7 day(s). Outcome: Progressing Towards Goal 
 PHYSICAL THERAPY TREATMENT Patient: Terell Ochoa (65 y.o. female) Date: 5/10/2019 Diagnosis: Altered mental status [R41.82] <principal problem not specified> Precautions: fall, drain RUQ Chart, physical therapy assessment, plan of care and goals were reviewed. ASSESSMENT: 
Pt received in supine with daughter at bedside. Noted plan for CT this afternoon. Pt alert, cooperative. Required repeated simple instruction and extra time to complete functional tasks. Pt mobilized with max A for bed mob, mod A transfers sit to stand, mod A side stepping to L with RW. Tolerated sit to stand x 3 trials. Returned to bed at end of session due to pending transport to test. 
 
Pt ambulatory with rollator at baseline. Remains below baseline LOF at this time. Recommend follow up rehab at SNF prior to return to memory care unit. Will continue to follow. Progression toward goals: 
?    Improving appropriately and progressing toward goals ? Improving slowly and progressing toward goals ? Not making progress toward goals and plan of care will be adjusted PLAN: 
Patient continues to benefit from skilled intervention to address the above impairments. Continue treatment per established plan of care. Discharge Recommendations:  Clifford Trejo Further Equipment Recommendations for Discharge:  TBD SUBJECTIVE:  
Patient stated I'll do whatever you tell me to. OBJECTIVE DATA SUMMARY:  
Critical Behavior: 
Neurologic State: Confused Orientation Level: Disoriented to time, Disoriented to situation, Disoriented to place Cognition: Follows commands(but inconsistent) Safety/Judgement: Decreased insight into deficits, Decreased awareness of need for safety, Decreased awareness of need for assistance, Decreased awareness of environment Functional Mobility Training: 
Bed Mobility: 
Rolling: Moderate assistance Supine to Sit: Maximum assistance(decreased initiation, assist at trunk and LEs from incr HOB) Sit to Supine: Moderate assistance(assist for LEs) Transfers: 
Sit to Stand: Moderate assistance(assist for lift/ balance  x 3 trials) Stand to Sit: Minimum assistance(decreased eccentric control) Balance: 
Sitting: Intact Standing: Impaired; With support Standing - Static: Poor;Constant support Standing - Dynamic : Poor Ambulation/Gait Training: 
Distance (ft): 3 Feet (ft)(3' to L) Assistive Device: Gait belt;Walker, rolling Ambulation - Level of Assistance: Moderate assistance; Additional time; Adaptive equipment Gait Description (WDL): (side stepping only this date) Gait Abnormalities: Decreased step clearance(increased time) Speed/Yady: Slow;Shuffled Step Length: Left shortened;Right shortened Pain: Pt denies c/o pain during session Activity Tolerance:  
Pt tolerated multiple standing trials and sidestepping with assist. Fatigued quickly with upright mobility. Please refer to the flowsheet for vital signs taken during this treatment. After treatment:  
?    Patient left in no apparent distress sitting up in chair ? Patient left in no apparent distress in bed 
? Call bell left within reach ? Nursing notified ?    Caregiver present ? Bed alarm activated COMMUNICATION/COLLABORATION:  
The patients plan of care was discussed with: Registered Nurse Juana Gomez, PT Time Calculation: 37 mins

## 2019-05-11 LAB
BACTERIA SPEC CULT: NORMAL
SERVICE CMNT-IMP: NORMAL

## 2019-05-11 PROCEDURE — 74011000258 HC RX REV CODE- 258: Performed by: INTERNAL MEDICINE

## 2019-05-11 PROCEDURE — 74011250636 HC RX REV CODE- 250/636: Performed by: INTERNAL MEDICINE

## 2019-05-11 PROCEDURE — 74011250637 HC RX REV CODE- 250/637: Performed by: INTERNAL MEDICINE

## 2019-05-11 PROCEDURE — 94664 DEMO&/EVAL PT USE INHALER: CPT

## 2019-05-11 PROCEDURE — 94640 AIRWAY INHALATION TREATMENT: CPT

## 2019-05-11 PROCEDURE — 65660000000 HC RM CCU STEPDOWN

## 2019-05-11 PROCEDURE — 77010033678 HC OXYGEN DAILY

## 2019-05-11 PROCEDURE — 74011000250 HC RX REV CODE- 250: Performed by: INTERNAL MEDICINE

## 2019-05-11 RX ORDER — POTASSIUM CHLORIDE 1.5 G/1.77G
40 POWDER, FOR SOLUTION ORAL
Status: COMPLETED | OUTPATIENT
Start: 2019-05-11 | End: 2019-05-11

## 2019-05-11 RX ORDER — LEVOFLOXACIN 750 MG/1
750 TABLET ORAL
Status: DISCONTINUED | OUTPATIENT
Start: 2019-05-11 | End: 2019-05-15 | Stop reason: HOSPADM

## 2019-05-11 RX ORDER — METRONIDAZOLE 250 MG/1
500 TABLET ORAL EVERY 12 HOURS
Status: DISCONTINUED | OUTPATIENT
Start: 2019-05-11 | End: 2019-05-15 | Stop reason: HOSPADM

## 2019-05-11 RX ADMIN — LEVOFLOXACIN 750 MG: 750 TABLET, FILM COATED ORAL at 10:04

## 2019-05-11 RX ADMIN — LOSARTAN POTASSIUM 100 MG: 50 TABLET ORAL at 10:04

## 2019-05-11 RX ADMIN — ARFORMOTEROL TARTRATE 15 MCG: 15 SOLUTION RESPIRATORY (INHALATION) at 22:37

## 2019-05-11 RX ADMIN — HEPARIN SODIUM 5000 UNITS: 5000 INJECTION INTRAVENOUS; SUBCUTANEOUS at 18:35

## 2019-05-11 RX ADMIN — AZITHROMYCIN 250 MG: 250 TABLET, FILM COATED ORAL at 10:04

## 2019-05-11 RX ADMIN — PIPERACILLIN AND TAZOBACTAM 3.38 G: 3; .375 INJECTION, POWDER, FOR SOLUTION INTRAVENOUS at 02:09

## 2019-05-11 RX ADMIN — METRONIDAZOLE 500 MG: 250 TABLET ORAL at 10:04

## 2019-05-11 RX ADMIN — PANTOPRAZOLE SODIUM 40 MG: 40 TABLET, DELAYED RELEASE ORAL at 06:47

## 2019-05-11 RX ADMIN — ETHAMBUTOL HYDROCHLORIDE 800 MG: 400 TABLET, FILM COATED ORAL at 15:39

## 2019-05-11 RX ADMIN — HYDROCHLOROTHIAZIDE 25 MG: 25 TABLET ORAL at 10:04

## 2019-05-11 RX ADMIN — Medication 10 ML: at 06:47

## 2019-05-11 RX ADMIN — BUDESONIDE 500 MCG: 0.5 INHALANT RESPIRATORY (INHALATION) at 09:05

## 2019-05-11 RX ADMIN — ONDANSETRON 4 MG: 2 INJECTION INTRAMUSCULAR; INTRAVENOUS at 12:56

## 2019-05-11 RX ADMIN — HEPARIN SODIUM 5000 UNITS: 5000 INJECTION INTRAVENOUS; SUBCUTANEOUS at 02:09

## 2019-05-11 RX ADMIN — HEPARIN SODIUM 5000 UNITS: 5000 INJECTION INTRAVENOUS; SUBCUTANEOUS at 10:20

## 2019-05-11 RX ADMIN — Medication 10 ML: at 15:47

## 2019-05-11 RX ADMIN — Medication 1 CAPSULE: at 10:05

## 2019-05-11 RX ADMIN — POTASSIUM CHLORIDE 40 MEQ: 1.5 POWDER, FOR SOLUTION ORAL at 10:14

## 2019-05-11 RX ADMIN — METRONIDAZOLE 500 MG: 250 TABLET ORAL at 20:42

## 2019-05-11 RX ADMIN — ARFORMOTEROL TARTRATE 15 MCG: 15 SOLUTION RESPIRATORY (INHALATION) at 09:05

## 2019-05-11 RX ADMIN — BUDESONIDE 500 MCG: 0.5 INHALANT RESPIRATORY (INHALATION) at 22:37

## 2019-05-11 NOTE — PROGRESS NOTES
Urology Have not seen her but I have reviewed the scan images and the chart She has a left renal mass, 2.5 cm, likely a stage I renal cell carcinoma. Best management for a mass like this in the elderly patient is usually observation. We will arrange to have her see Dr Tirso Paul in the office as usual for this. No need for urgent intervention now, would probably be unwise in the setting of active infection in the abdomen. Thanks for consult. Please reconsult if needed.

## 2019-05-11 NOTE — PROGRESS NOTES
Bedside shift change report given to Srikanth (oncoming nurse) by Clay Daniel (offgoing nurse). Report included the following information SBAR.

## 2019-05-11 NOTE — PROGRESS NOTES
Day #1 of levofloxacin Indication:  intra-abdominal infection Current regimen:  750 mg Q24H Abx regimen: levofloxacin + metroniazole Recent Labs 05/10/19 
0530 19 
0240 WBC 11.2* 13.1*  
CREA 0.74 0.80 BUN 17 28* Est CrCl: 40-45 ml/min; UO: 0.7 ml/kg/hr Temp (24hrs), Av.5 °F (36.4 °C), Min:96.5 °F (35.8 °C), Max:98.2 °F (36.8 °C) Plan: Change to levofloxacin 750 mg Q48H

## 2019-05-11 NOTE — PROGRESS NOTES
Progress Note Patient: Tommye Kawasaki MRN: [id number]  SSN: [id number] YOB: 1924  Age: 80 y.o. Sex: female Admit Date: 2019 * No surgery found * Procedure:   
 
Subjective:  
 
Patient not really hungry- No nausea or vomiting. Objective:  
 
Visit Vitals /74 Pulse 80 Temp 98 °F (36.7 °C) Resp 16 Ht 5' 3.74\" (1.619 m) Wt 143 lb 11.8 oz (65.2 kg) SpO2 96% BMI 24.87 kg/m² Temp (24hrs), Av.7 °F (36.5 °C), Min:96.7 °F (35.9 °C), Max:98.2 °F (36.8 °C) Physical Exam:   
Gen- Alert in NAD Lungs- CTA H-RRR Abd- soft with mild RUQ tenderness Cholecystostomy - bilious Data Review: images and reports reviewed CT- Cholecystostomy tube in satisfactory position. No hepatic mass or abscess. Enhancing mass left kidney is concerning for neoplasm. Mild free fluid. Bilateral effusions right greater than left with underlying atelectasis. 
  
Lab Review: All lab results for the last 24 hours reviewed. No results found for this or any previous visit (from the past 24 hour(s)). Assessment:  
 
Hospital Problems  Date Reviewed: 2019 Codes Class Noted POA Altered mental status ICD-10-CM: R41.82 
ICD-9-CM: 780.97  2019 Unknown Plan/Recommendations/Medical Decision Making: No sign of hepatic abscess- will only need about 2 weeks of abx. Continue cholecystostomy tube. Diet as tolerated- once tolerating can be discharged home.

## 2019-05-11 NOTE — PROGRESS NOTES
Clinical Pharmacy Note: Metronidazole Dosing Please note that the metronidazole dose for Shearon Niranjan has been changed to 500 mg PO q12h per [facility]-approved protocol. Please contact the pharmacy with any questions.  
 
Zaira Lafleur, BRID

## 2019-05-11 NOTE — PROGRESS NOTES
Hospitalist Progress Note 9[address] [facility],  Answering service: [phone] OR [phone] from in house phone Date of Service:  2019 NAME:  Mary Kate Bucio :  1924 MRN:  [id number] Admission Summary:  
80year old female presenting with altered mental status and found to have concern for cholecystitis. Interval history / Subjective:  
Patient seen and examined. Nauseous, no vomiting. Encouraged oral intake. Transitioned to oral antibiotics- will need 14 days. CT abd/pel without additional fluid collection. Renal mass noted- appreciate urology, suspect stage I renal cell carcinoma- observation as outpatient. Assessment & Plan:  
 
Sepsis, secondary to cholecystitis with fluid collection:  
-CT abd/pelvis with 9.4 x 5.2 x 5 cm collection  
-s/p IR cholecystotomy tube 
-General surgery following, will need to keep in place for couple weeks, follow up 3 weeks. Cholangiogram as outpatient 
-Repeat CT scan without additional fluid collection  
-continue levaquin, flagyl, total 14 days Left renal mass, 2.5 cm: appreciate urology review of CT scan, suspect stage I renal cell carcinoma, outpatient follow up with Dr. Robert Montero Acute hypoxic respiratory failure:  
-wean oxygen Pneumonia: continue antibiotics as above Citrobacter UTI: antibiotics as above Hypokalemia: replace as needed History of MAC: continue home ethambutol, azithromycin Dementia: supportive care Hypertension:  Losartan, HCTZ Code status: dnr 
DVT prophylaxis: heparin Care Plan discussed with: Patient/Family Disposition: TBD Hospital Problems  Date Reviewed: 2019 Codes Class Noted POA Altered mental status ICD-10-CM: R41.82 
ICD-9-CM: 780.97  2019 Unknown Review of Systems:  
Negative unless stated above Vital Signs:  
 Last 24hrs VS reviewed since prior progress note. Most recent are: 
Visit Vitals /80 (BP 1 Location: Left arm, BP Patient Position: At rest) Pulse 83 Temp 97.7 °F (36.5 °C) Resp 16 Ht 5' 3.74\" (1.619 m) Wt 65.2 kg (143 lb 11.8 oz) SpO2 94% BMI 24.87 kg/m² Intake/Output Summary (Last 24 hours) at 5/11/2019 1736 Last data filed at 5/11/2019 1400 Gross per 24 hour Intake 10 ml Output 1485 ml Net -1475 ml Physical Examination:  
 
 
     
Constitutional:  No acute distress, cooperative, pleasant   
ENT:  Oral mucous moist, oropharynx benign. Neck supple, Resp:  CTA bilaterally. No wheezing/rhonchi/rales. No accessory muscle use CV:  Regular rhythm, normal rate, no murmurs, gallops, rubs GI:  right cholecystomy tube in place, Soft, non distended, non tender Musculoskeletal:  No edema, warm, 2+ pulses throughout Neurologic:  Moves all extremities. Data Review:  
 Review and/or order of clinical lab test 
 
 
Labs:  
 
Recent Labs 05/10/19 
0530 05/09/19 
0240 WBC 11.2* 13.1* HGB 11.0* 11.0*  
HCT 34.2* 33.6*  249 Recent Labs 05/10/19 
0530 05/09/19 
0240  146*  
K 3.0* 3.2*  
* 115* CO2 25 23 BUN 17 28* CREA 0.74 0.80 GLU 90 79 CA 8.0* 8.3* Recent Labs 05/10/19 
0530 05/09/19 
0240 SGOT 23 31 ALT 25 32 AP 60 56 TBILI 0.5 0.8 TP 5.3* 5.4* ALB 2.1* 2.2*  
GLOB 3.2 3.2 No results for input(s): INR, PTP, APTT in the last 72 hours. No lab exists for component: INREXT, INREXT No results for input(s): FE, TIBC, PSAT, FERR in the last 72 hours. No results found for: FOL, RBCF No results for input(s): PH, PCO2, PO2 in the last 72 hours. No results for input(s): CPK, CKNDX, TROIQ in the last 72 hours. No lab exists [facility] component: [facility] Lab Results Component Value Date/Time  Cholesterol, total 208 (H) 05/11/2009 12:15 PM  
 HDL Cholesterol 90 (H) 05/11/2009 12:15 PM  
 LDL, calculated 107.6 (H) 05/11/2009 12:15 PM  
 Triglyceride 52 05/11/2009 12:15 PM  
 CHOL/HDL Ratio 2.3 05/11/2009 12:15 PM  
 
Lab Results Component Value Date/Time Glucose (POC) 183 (H) 05/07/2019 10:57 AM  
 
Lab Results Component Value Date/Time Color YELLOW/STRAW 05/07/2019 11:42 AM  
 Appearance TURBID (A) 05/07/2019 11:42 AM  
 Specific gravity 1.021 05/07/2019 11:42 AM  
 pH (UA) 5.5 05/07/2019 11:42 AM  
 Protein 100 (A) 05/07/2019 11:42 AM  
 Glucose NEGATIVE  05/07/2019 11:42 AM  
 Ketone NEGATIVE  05/07/2019 11:42 AM  
 Bilirubin NEGATIVE  05/07/2019 11:42 AM  
 Urobilinogen 0.2 05/07/2019 11:42 AM  
 Nitrites POSITIVE (A) 05/07/2019 11:42 AM  
 Leukocyte Esterase LARGE (A) 05/07/2019 11:42 AM  
 Epithelial cells FEW 05/07/2019 11:42 AM  
 Bacteria 2+ (A) 05/07/2019 11:42 AM  
 WBC >100 (H) 05/07/2019 11:42 AM  
 RBC 5-10 05/07/2019 11:42 AM  
 
 
 
Medications Reviewed:  
 
Current Facility-Administered Medications Medication Dose Route Frequency  metroNIDAZOLE (FLAGYL) tablet 500 mg  500 mg Oral Q12H  levoFLOXacin (LEVAQUIN) tablet 750 mg  750 mg Oral Q48H  
 losartan (COZAAR) tablet 100 mg  100 mg Oral DAILY  hydroCHLOROthiazide (HYDRODIURIL) tablet 25 mg  25 mg Oral DAILY  ondansetron (ZOFRAN) injection 4 mg  4 mg IntraVENous Q8H PRN  
 albuterol-ipratropium (DUO-NEB) 2.5 MG-0.5 MG/3 ML  3 mL Nebulization Q6H PRN  
 azithromycin (ZITHROMAX) tablet 250 mg  250 mg Oral DAILY  pantoprazole (PROTONIX) tablet 40 mg  40 mg Oral ACB  ethambutol (MYAMBUTOL) tablet 800 mg  800 mg Oral DAILY  lactobac ac& pc-s.therm-b.anim (MANI Q/RISAQUAD)  1 Cap Oral DAILY  sodium chloride (NS) flush 5-40 mL  5-40 mL IntraVENous Q8H  
 sodium chloride (NS) flush 5-40 mL  5-40 mL IntraVENous PRN  
 acetaminophen (TYLENOL) suppository 650 mg  650 mg Rectal Q4H PRN  
 heparin (porcine) injection 5,000 Units  5,000 Units SubCUTAneous Q8H  
 arformoterol (BROVANA) neb solution 15 mcg  15 mcg Nebulization BID RT  And  
  budesonide (PULMICORT) 500 mcg/2 ml nebulizer suspension  500 mcg Nebulization BID RT  
 
______________________________________________________________________ EXPECTED LENGTH OF STAY: 4d 19h ACTUAL LENGTH OF STAY:          4 [id number] [facility],

## 2019-05-12 LAB
ANION GAP SERPL CALC-SCNC: 8 MMOL/L (ref 5–15)
BACTERIA SPEC CULT: NORMAL
BACTERIA SPEC CULT: NORMAL
BUN SERPL-MCNC: 13 MG/DL (ref 6–20)
BUN/CREAT SERPL: 16 (ref 12–20)
CALCIUM SERPL-MCNC: 8.4 MG/DL (ref 8.5–10.1)
CHLORIDE SERPL-SCNC: 110 MMOL/L (ref 97–108)
CO2 SERPL-SCNC: 24 MMOL/L (ref 21–32)
CREAT SERPL-MCNC: 0.81 MG/DL (ref 0.55–1.02)
GLUCOSE SERPL-MCNC: 86 MG/DL (ref 65–100)
GRAM STN SPEC: NORMAL
GRAM STN SPEC: NORMAL
POTASSIUM SERPL-SCNC: 3.4 MMOL/L (ref 3.5–5.1)
SERVICE CMNT-IMP: NORMAL
SODIUM SERPL-SCNC: 142 MMOL/L (ref 136–145)

## 2019-05-12 PROCEDURE — 94760 N-INVAS EAR/PLS OXIMETRY 1: CPT

## 2019-05-12 PROCEDURE — 74011000250 HC RX REV CODE- 250: Performed by: INTERNAL MEDICINE

## 2019-05-12 PROCEDURE — 74011250636 HC RX REV CODE- 250/636: Performed by: INTERNAL MEDICINE

## 2019-05-12 PROCEDURE — 74011250637 HC RX REV CODE- 250/637: Performed by: INTERNAL MEDICINE

## 2019-05-12 PROCEDURE — 65270000029 HC RM PRIVATE

## 2019-05-12 PROCEDURE — 80048 BASIC METABOLIC PNL TOTAL CA: CPT

## 2019-05-12 PROCEDURE — 94640 AIRWAY INHALATION TREATMENT: CPT

## 2019-05-12 PROCEDURE — 36415 COLL VENOUS BLD VENIPUNCTURE: CPT

## 2019-05-12 PROCEDURE — 77010033678 HC OXYGEN DAILY

## 2019-05-12 RX ORDER — POTASSIUM CHLORIDE 750 MG/1
20 TABLET, FILM COATED, EXTENDED RELEASE ORAL
Status: COMPLETED | OUTPATIENT
Start: 2019-05-12 | End: 2019-05-12

## 2019-05-12 RX ADMIN — METRONIDAZOLE 500 MG: 250 TABLET ORAL at 20:43

## 2019-05-12 RX ADMIN — LOSARTAN POTASSIUM 100 MG: 50 TABLET ORAL at 10:02

## 2019-05-12 RX ADMIN — AZITHROMYCIN 250 MG: 250 TABLET, FILM COATED ORAL at 10:02

## 2019-05-12 RX ADMIN — HEPARIN SODIUM 5000 UNITS: 5000 INJECTION INTRAVENOUS; SUBCUTANEOUS at 10:02

## 2019-05-12 RX ADMIN — Medication 20 ML: at 20:44

## 2019-05-12 RX ADMIN — BUDESONIDE 500 MCG: 0.5 INHALANT RESPIRATORY (INHALATION) at 19:46

## 2019-05-12 RX ADMIN — POTASSIUM CHLORIDE 20 MEQ: 750 TABLET, EXTENDED RELEASE ORAL at 10:01

## 2019-05-12 RX ADMIN — BUDESONIDE 500 MCG: 0.5 INHALANT RESPIRATORY (INHALATION) at 09:43

## 2019-05-12 RX ADMIN — ARFORMOTEROL TARTRATE 15 MCG: 15 SOLUTION RESPIRATORY (INHALATION) at 19:46

## 2019-05-12 RX ADMIN — ETHAMBUTOL HYDROCHLORIDE 800 MG: 400 TABLET, FILM COATED ORAL at 10:03

## 2019-05-12 RX ADMIN — ARFORMOTEROL TARTRATE 15 MCG: 15 SOLUTION RESPIRATORY (INHALATION) at 09:43

## 2019-05-12 RX ADMIN — Medication 10 ML: at 06:32

## 2019-05-12 RX ADMIN — HEPARIN SODIUM 5000 UNITS: 5000 INJECTION INTRAVENOUS; SUBCUTANEOUS at 19:19

## 2019-05-12 RX ADMIN — METRONIDAZOLE 500 MG: 250 TABLET ORAL at 10:01

## 2019-05-12 RX ADMIN — PANTOPRAZOLE SODIUM 40 MG: 40 TABLET, DELAYED RELEASE ORAL at 06:33

## 2019-05-12 RX ADMIN — Medication 1 CAPSULE: at 10:02

## 2019-05-12 RX ADMIN — HYDROCHLOROTHIAZIDE 25 MG: 25 TABLET ORAL at 10:02

## 2019-05-12 NOTE — PROGRESS NOTES
Hospitalist Progress Note [address] [facility], DO Answering service: [phone] OR [phone] from in house phone Date of Service:  2019 NAME:  Aria Vail :  1924 MRN:  [id number] Admission Summary:  
80year old female presenting with altered mental status and found to have concern for cholecystitis. Interval history / Subjective:  
Patient seen and examined. No complaints. Oral intake improving. Will have PT reevaluate for discharge plans. Weaker from baseline since admission. Assessment & Plan:  
 
Sepsis, secondary to cholecystitis with fluid collection:  
-CT abd/pelvis with 9.4 x 5.2 x 5 cm collection  
-s/p IR cholecystotomy tube 
-General surgery following, will need to keep in place for couple weeks, follow up 3 weeks. Cholangiogram as outpatient 
-Repeat CT scan without additional fluid collection  
-continue levaquin, flagyl, total 14 days 
-Diet as tolerated Left renal mass, 2.5 cm: appreciate urology review of CT scan, suspect stage I renal cell carcinoma, outpatient follow up with Dr. Yady Stiles Acute hypoxic respiratory failure: wean oxygen Pneumonia: continue antibiotics as above Citrobacter UTI: antibiotics as above Hypokalemia: replace as needed History of MAC: continue home ethambutol, azithromycin Dementia: supportive care Hypertension:  Losartan, HCTZ Code status: dnr 
DVT prophylaxis: heparin Care Plan discussed with: Patient/Family Disposition: TBD needs PT reassessment Hospital Problems  Date Reviewed: 2019 Codes Class Noted POA Altered mental status ICD-10-CM: R41.82 
ICD-9-CM: 780.97  2019 Unknown Review of Systems:  
Negative unless stated above Vital Signs:  
 Last 24hrs VS reviewed since prior progress note. Most recent are: 
Visit Vitals /61 (BP 1 Location: Left arm, BP Patient Position: At rest) Pulse 85 Temp 97.5 °F (36.4 °C) Resp 16 Ht 5' 3.74\" (1.619 m) Wt 65.2 kg (143 lb 11.8 oz) SpO2 93% BMI 24.87 kg/m² Intake/Output Summary (Last 24 hours) at 5/12/2019 1748 Last data filed at 5/12/2019 1525 Gross per 24 hour Intake 300 ml Output 430 ml Net -130 ml Physical Examination:  
 
 
     
Constitutional:  No acute distress, cooperative, pleasant   
ENT:  Oral mucous moist, oropharynx benign. Neck supple, Resp:  CTA bilaterally. No wheezing/rhonchi/rales. No accessory muscle use CV:  Regular rhythm, normal rate, no murmurs, gallops, rubs GI:  right cholecystomy tube in place, Soft, non distended, non tender Musculoskeletal:  No edema, warm, 2+ pulses throughout Neurologic:  Moves all extremities. Data Review:  
 Review and/or order of clinical lab test 
 
 
Labs:  
 
Recent Labs 05/10/19 
0530 WBC 11.2* HGB 11.0*  
HCT 34.2*  
 Recent Labs 05/12/19 
0310 05/10/19 
0530  142  
K 3.4* 3.0*  
* 110* CO2 24 25 BUN 13 17 CREA 0.81 0.74 GLU 86 90  
CA 8.4* 8.0* Recent Labs 05/10/19 
0530 SGOT 23 ALT 25 AP 60 TBILI 0.5 TP 5.3* ALB 2.1*  
GLOB 3.2 No results for input(s): INR, PTP, APTT in the last 72 hours. No lab exists for component: INREXT, INREXT No results for input(s): FE, TIBC, PSAT, FERR in the last 72 hours. No results found for: FOL, RBCF No results for input(s): PH, PCO2, PO2 in the last 72 hours. No results for input(s): CPK, CKNDX, TROIQ in the last 72 hours. No lab exists for component: CPKMB Lab Results Component Value Date/Time Cholesterol, total 208 (H) 05/11/2009 12:15 PM  
 HDL Cholesterol 90 (H) 05/11/2009 12:15 PM  
 LDL, calculated 107.6 (H) 05/11/2009 12:15 PM  
 Triglyceride 52 05/11/2009 12:15 PM  
 CHOL/HDL Ratio 2.3 05/11/2009 12:15 PM  
 
Lab Results Component Value Date/Time Glucose (POC) 183 (H) 05/07/2019 10:57 AM  
 
Lab Results Component Value Date/Time Color YELLOW/STRAW 05/07/2019 11:42 AM  
 Appearance TURBID (A) 05/07/2019 11:42 AM  
 Specific gravity 1.021 05/07/2019 11:42 AM  
 pH (UA) 5.5 05/07/2019 11:42 AM  
 Protein 100 (A) 05/07/2019 11:42 AM  
 Glucose NEGATIVE  05/07/2019 11:42 AM  
 Ketone NEGATIVE  05/07/2019 11:42 AM  
 Bilirubin NEGATIVE  05/07/2019 11:42 AM  
 Urobilinogen 0.2 05/07/2019 11:42 AM  
 Nitrites POSITIVE (A) 05/07/2019 11:42 AM  
 Leukocyte Esterase LARGE (A) 05/07/2019 11:42 AM  
 Epithelial cells FEW 05/07/2019 11:42 AM  
 Bacteria 2+ (A) 05/07/2019 11:42 AM  
 WBC >100 (H) 05/07/2019 11:42 AM  
 RBC 5-10 05/07/2019 11:42 AM  
 
 
 
Medications Reviewed:  
 
Current Facility-Administered Medications Medication Dose Route Frequency  metroNIDAZOLE (FLAGYL) tablet 500 mg  500 mg Oral Q12H  levoFLOXacin (LEVAQUIN) tablet 750 mg  750 mg Oral Q48H  
 losartan (COZAAR) tablet 100 mg  100 mg Oral DAILY  hydroCHLOROthiazide (HYDRODIURIL) tablet 25 mg  25 mg Oral DAILY  ondansetron (ZOFRAN) injection 4 mg  4 mg IntraVENous Q8H PRN  
 albuterol-ipratropium (DUO-NEB) 2.5 MG-0.5 MG/3 ML  3 mL Nebulization Q6H PRN  
 azithromycin (ZITHROMAX) tablet 250 mg  250 mg Oral DAILY  pantoprazole (PROTONIX) tablet 40 mg  40 mg Oral ACB  ethambutol (MYAMBUTOL) tablet 800 mg  800 mg Oral DAILY  lactobac ac& pc-s.therm-b.anim (MANI Q/RISAQUAD)  1 Cap Oral DAILY  sodium chloride (NS) flush 5-40 mL  5-40 mL IntraVENous Q8H  
 sodium chloride (NS) flush 5-40 mL  5-40 mL IntraVENous PRN  
 acetaminophen (TYLENOL) suppository 650 mg  650 mg Rectal Q4H PRN  
 heparin (porcine) injection 5,000 Units  5,000 Units SubCUTAneous Q8H  
 arformoterol (BROVANA) neb solution 15 mcg  15 mcg Nebulization BID RT And  
 budesonide (PULMICORT) 500 mcg/2 ml nebulizer suspension  500 mcg Nebulization BID RT  
 
______________________________________________________________________ EXPECTED LENGTH OF STAY: 4d 19h ACTUAL LENGTH OF STAY:          5 [id number] [facility], DO

## 2019-05-12 NOTE — PROGRESS NOTES
Bedside and Verbal shift change report given to [address] [place] [address] (oncoming nurse) by Lindy Whalen (offgoing nurse). Report included the following information SBAR and Kardex.

## 2019-05-12 NOTE — PROGRESS NOTES
Progress Note Patient: Lonny Angel MRN: [id number]  SSN: [id number] YOB: 1924  Age: 80 y.o. Sex: female Admit Date: 2019 * No surgery found * Procedure:   
 
Subjective:  
 
Patient states that she is eating a little better. No real pain. Objective:  
 
Visit Vitals /61 (BP 1 Location: Left arm, BP Patient Position: At rest) Pulse 85 Temp 97.5 °F (36.4 °C) Resp 16 Ht 5' 3.74\" (1.619 m) Wt 143 lb 11.8 oz (65.2 kg) SpO2 93% BMI 24.87 kg/m² Temp (24hrs), Av.9 °F (36.6 °C), Min:97.5 °F (36.4 °C), Max:98.6 °F (37 °C) Physical Exam:   
Gen- Alert in NAD Lungs- CTA H-RRR Abd- s/nt/nd Cholecystostomy bilious Data Review: images and reports reviewed Lab Review: All lab results for the last 24 hours reviewed. Recent Results (from the past 24 hour(s)) METABOLIC PANEL, BASIC Collection Time: 19  3:10 AM  
Result Value Ref Range Sodium 142 136 - 145 mmol/L Potassium 3.4 (L) 3.5 - 5.1 mmol/L Chloride 110 (H) 97 - 108 mmol/L  
 CO2 24 21 - 32 mmol/L Anion gap 8 5 - 15 mmol/L Glucose 86 65 - 100 mg/dL BUN 13 6 - 20 MG/DL Creatinine 0.81 0.55 - 1.02 MG/DL  
 BUN/Creatinine ratio 16 12 - 20 GFR est AA >60 >60 ml/min/1.73m2 GFR est non-AA >60 >60 ml/min/1.73m2 Calcium 8.4 (L) 8.5 - 10.1 MG/DL Assessment:  
 
Hospital Problems  Date Reviewed: 2019 Codes Class Noted POA Altered mental status ICD-10-CM: R41.82 
ICD-9-CM: 780.97  2019 Unknown Plan/Recommendations/Medical Decision Making:  
Continue ABX Diet as tolerated Cholecystostomy tube.

## 2019-05-12 NOTE — PROGRESS NOTES
Attempted to draw patient's am labs. Patient jumpy and only able to get minimal blood for green top for metabolic panel and patient told me to stop and leave her alone. Unable to get CBC tube. Will send green tube to lab and see if it is enough to run metabolic panel. Will attempt again later if patient willing.

## 2019-05-13 ENCOUNTER — APPOINTMENT (OUTPATIENT)
Dept: GENERAL RADIOLOGY | Age: 84
DRG: 871 | End: 2019-05-13
Attending: INTERNAL MEDICINE
Payer: MEDICARE

## 2019-05-13 LAB
ALBUMIN SERPL-MCNC: 2.3 G/DL (ref 3.5–5)
ALBUMIN/GLOB SERPL: 0.7 {RATIO} (ref 1.1–2.2)
ALP SERPL-CCNC: 65 U/L (ref 45–117)
ALT SERPL-CCNC: 54 U/L (ref 12–78)
ANION GAP SERPL CALC-SCNC: 5 MMOL/L (ref 5–15)
AST SERPL-CCNC: 67 U/L (ref 15–37)
BILIRUB DIRECT SERPL-MCNC: <0.1 MG/DL (ref 0–0.2)
BILIRUB SERPL-MCNC: 0.3 MG/DL (ref 0.2–1)
BUN SERPL-MCNC: 14 MG/DL (ref 6–20)
BUN/CREAT SERPL: 16 (ref 12–20)
CALCIUM SERPL-MCNC: 8.3 MG/DL (ref 8.5–10.1)
CHLORIDE SERPL-SCNC: 106 MMOL/L (ref 97–108)
CO2 SERPL-SCNC: 31 MMOL/L (ref 21–32)
CREAT SERPL-MCNC: 0.89 MG/DL (ref 0.55–1.02)
GLOBULIN SER CALC-MCNC: 3.1 G/DL (ref 2–4)
GLUCOSE SERPL-MCNC: 87 MG/DL (ref 65–100)
POTASSIUM SERPL-SCNC: 3.1 MMOL/L (ref 3.5–5.1)
PROT SERPL-MCNC: 5.4 G/DL (ref 6.4–8.2)
SODIUM SERPL-SCNC: 142 MMOL/L (ref 136–145)

## 2019-05-13 PROCEDURE — 80076 HEPATIC FUNCTION PANEL: CPT

## 2019-05-13 PROCEDURE — 97530 THERAPEUTIC ACTIVITIES: CPT

## 2019-05-13 PROCEDURE — 74011000250 HC RX REV CODE- 250: Performed by: INTERNAL MEDICINE

## 2019-05-13 PROCEDURE — 74011250637 HC RX REV CODE- 250/637: Performed by: INTERNAL MEDICINE

## 2019-05-13 PROCEDURE — 71045 X-RAY EXAM CHEST 1 VIEW: CPT

## 2019-05-13 PROCEDURE — 77030038269 HC DRN EXT URIN PURWCK BARD -A

## 2019-05-13 PROCEDURE — 94640 AIRWAY INHALATION TREATMENT: CPT

## 2019-05-13 PROCEDURE — 65270000029 HC RM PRIVATE

## 2019-05-13 PROCEDURE — 80048 BASIC METABOLIC PNL TOTAL CA: CPT

## 2019-05-13 PROCEDURE — 94760 N-INVAS EAR/PLS OXIMETRY 1: CPT

## 2019-05-13 PROCEDURE — 74011250636 HC RX REV CODE- 250/636: Performed by: INTERNAL MEDICINE

## 2019-05-13 PROCEDURE — 36415 COLL VENOUS BLD VENIPUNCTURE: CPT

## 2019-05-13 PROCEDURE — 77010033678 HC OXYGEN DAILY

## 2019-05-13 RX ORDER — POTASSIUM CHLORIDE 750 MG/1
40 TABLET, FILM COATED, EXTENDED RELEASE ORAL
Status: COMPLETED | OUTPATIENT
Start: 2019-05-13 | End: 2019-05-13

## 2019-05-13 RX ADMIN — AZITHROMYCIN 250 MG: 250 TABLET, FILM COATED ORAL at 09:48

## 2019-05-13 RX ADMIN — HEPARIN SODIUM 5000 UNITS: 5000 INJECTION INTRAVENOUS; SUBCUTANEOUS at 09:46

## 2019-05-13 RX ADMIN — Medication 10 ML: at 06:50

## 2019-05-13 RX ADMIN — METRONIDAZOLE 500 MG: 250 TABLET ORAL at 09:47

## 2019-05-13 RX ADMIN — LEVOFLOXACIN 750 MG: 750 TABLET, FILM COATED ORAL at 09:47

## 2019-05-13 RX ADMIN — Medication 10 ML: at 21:36

## 2019-05-13 RX ADMIN — ETHAMBUTOL HYDROCHLORIDE 800 MG: 400 TABLET, FILM COATED ORAL at 09:46

## 2019-05-13 RX ADMIN — BUDESONIDE 500 MCG: 0.5 INHALANT RESPIRATORY (INHALATION) at 21:00

## 2019-05-13 RX ADMIN — ARFORMOTEROL TARTRATE 15 MCG: 15 SOLUTION RESPIRATORY (INHALATION) at 21:00

## 2019-05-13 RX ADMIN — HEPARIN SODIUM 5000 UNITS: 5000 INJECTION INTRAVENOUS; SUBCUTANEOUS at 17:44

## 2019-05-13 RX ADMIN — HYDROCHLOROTHIAZIDE 25 MG: 25 TABLET ORAL at 09:47

## 2019-05-13 RX ADMIN — Medication 10 ML: at 14:00

## 2019-05-13 RX ADMIN — Medication 10 ML: at 06:18

## 2019-05-13 RX ADMIN — Medication 1 CAPSULE: at 09:47

## 2019-05-13 RX ADMIN — LOSARTAN POTASSIUM 100 MG: 50 TABLET ORAL at 09:47

## 2019-05-13 RX ADMIN — HEPARIN SODIUM 5000 UNITS: 5000 INJECTION INTRAVENOUS; SUBCUTANEOUS at 03:02

## 2019-05-13 RX ADMIN — PANTOPRAZOLE SODIUM 40 MG: 40 TABLET, DELAYED RELEASE ORAL at 06:50

## 2019-05-13 RX ADMIN — POTASSIUM CHLORIDE 40 MEQ: 750 TABLET, EXTENDED RELEASE ORAL at 09:48

## 2019-05-13 RX ADMIN — METRONIDAZOLE 500 MG: 250 TABLET ORAL at 21:35

## 2019-05-13 NOTE — PROGRESS NOTES
Patient continues to destat. On 2L of Oxygen. Continuing to monitor. Dr. Thorpe Peppers her to be weaned off O2 but with the patient continuing to destat, I will keep her on 2 L for right now.

## 2019-05-13 NOTE — PROGRESS NOTES
5/13/19 -  
CM participated in IDRs on patient and rounded with attending. Patient is ready for discharge. Patient will be discharging with a kinsey drain in place. CM contacted [facility] (Miles Gamboa: [phone]) and confirmed ability to receive the patient today, 5/13. Patient will be received in the Health Care Unit's secured memory care section. Patient is going to Room 9226. CM submitted transport request to [facility] via [employer]. CM requested transport time of 13:30. CM to complete: PCS, face sheet, H&P, SBAR, KARDEX, DNR. Packet on patient's hard chart. NURSING TO COMPLETE: DISCHARGE, MAR, REPORT TO: [phone], Douglas Fitch CRM: Trell Rodriguez, MPH, [facility]; Z: [phone] 
 
12:28 -  
CM received notice from attending that discharge will be delayed. CM submitted update to [facility] via [employer] to place patient on will call list.  CM notified facility Bradley Sheldon) of delay. CRM: Trell Rodriguez, MPH, [address] [place] [address]; Z: [phone]

## 2019-05-13 NOTE — PROGRESS NOTES
Problem: Mobility Impaired (Adult and Pediatric) Goal: *Acute Goals and Plan of Care (Insert Text) Description Physical Therapy Goals Initiated 5/9/2019 1. Patient will move from supine to sit and sit to supine  and roll side to side in bed with modified independence within 7 day(s). 2.  Patient will transfer from bed to chair and chair to bed with supervision/set-up using the least restrictive device within 7 day(s). 3.  Patient will perform sit to stand with supervision/set-up within 7 day(s). 4.  Patient will ambulate with supervision/set-up for 150 feet with the least restrictive device within 7 day(s). Outcome: Progressing Towards Goal 
  
PHYSICAL THERAPY TREATMENT Patient: Duke Ureña (86 y.o. female) Date: 5/13/2019 Diagnosis: Altered mental status [R41.82] <principal problem not specified> Precautions: Contact, Fall, Bed Alarm Chart, physical therapy assessment, plan of care and goals were reviewed. ASSESSMENT: 
Based on the objective data described below, the patient presents with Moderate Assistance level overall for transfers. Pt requires increased length of time EOB with frequent cues for sitting forward/upright and preventing LOB backwards. PT transfers pt to chair in pivot position and back to bed later when RN unable to get patient to WB through feet. Pt demos decreased endurance and confidence OOB and requires max encouragement to perform. Pt dtr present and very upset regarding pt apparent decline in function, explained some declines to be expected while in acute care. The following are barriers to independence while in acute care:  
-Cognitive and/or behavioral: orientation, attention to task, command following, processing, initiation, safety awareness, insight into deficits, insight into abilities and fear of falling 
-Medical condition: strength, functional reach, functional endurance, sitting balance, medical history, proprioception and coordination -Other: PLAN: 
Patient continues to benefit from skilled intervention to address the above impairments. Continue treatment per established plan of care. Recommend with staff: EOB and chair position when possible Recommend next PT session: OOB and attempt gait Discharge recommendations: Rehab at skilled nursing facility (SNF) (to regain functional baseline patient requires rehab) If above is not an option then recommend: To be determined between the prior selections, based on patient progress during hospital stay Patient's barriers to discharging home, in addition to above impairments: none. Equipment recommendations for successful discharge (if) home: TBD SUBJECTIVE:  
Patient stated I guess I can try.  OBJECTIVE DATA SUMMARY:  
Critical Behavior: 
Neurologic State: Alert, Confused Orientation Level: Oriented to person, Disoriented to place, Disoriented to situation, Disoriented to time Cognition: Memory loss Safety/Judgement: Decreased awareness of need for safety, Decreased awareness of need for assistance, Decreased insight into deficits, Decreased awareness of environment Functional Mobility Training: 
Bed Mobility: 
Rolling: Moderate assistance Supine to Sit: Moderate assistance Scooting: Moderate assistance Transfers: 
Sit to Stand: Moderate assistance Stand to Sit: Maximum assistance Bed to Chair: Maximum assistance Balance: 
Sitting: Impaired Sitting - Static: Fair (occasional) Sitting - Dynamic: Fair (occasional) Standing: Impaired; With support;Pull to stand Standing - Static: Fair;Constant support Standing - Dynamic : Poor Ambulation/Gait Training: 
  
Pain: 
Pre treatment: 0 /10 During treatment: 0/10 Post treatment:  0/10 Location: denies pain at this time Description: 
Aggravating factors: Activity Tolerance:  
Fair Please refer to the flowsheet for vital signs taken during this treatment. After treatment patient left:  
Up in chair Call light within reach RN notified Family at bedside COMMUNICATION/COLLABORATION:  
The patients plan of care was discussed with: Occupational Therapist and  Michael Noriega, PT Time Calculation: 39 mins

## 2019-05-13 NOTE — PROGRESS NOTES
Occupational Therapy Chart reviewed. Spoke with PT who worked with pt earlier. Pt now back to bed after sitting up in chair x 2 hours and fatigued. Will follow back this PM. Job Gist, OT 
 
2:39 PM 
Attempted to see pt for OT. Pt reported not feeling well, daughter at bedside assisting with feeding pt. Will defer.

## 2019-05-13 NOTE — PROGRESS NOTES
Progress Note Patient: Susy Lerner MRN: [id number]  SSN: [id number] YOB: 1924  Age: 80 y.o. Sex: female Admit Date: 2019 * No surgery found * Procedure:   
 
Subjective:  
 
Patient states she isn't feeling well today but can't explain what/why. She ate some food earlier and felt worse after the food. She is having bowel function. Denies n/v. Not really complaining of abdominal pain. Objective:  
 
Visit Vitals /81 (BP 1 Location: Left arm, BP Patient Position: At rest) Pulse 67 Temp 98 °F (36.7 °C) Resp 16 Ht 5' 3.74\" (1.619 m) Wt 143 lb 11.8 oz (65.2 kg) SpO2 93% BMI 24.87 kg/m² Temp (24hrs), Av.9 °F (36.6 °C), Min:97.5 °F (36.4 °C), Max:98.6 °F (37 °C) Physical Exam:   
Gen- Alert, appears uncomfortable. Lungs- wheezing on right CV- RRR Abd- s/nt/nd. Cholecystostomy tube with bilious output Data Review: images and reports reviewed Lab Review: All lab results for the last 24 hours reviewed. Recent Results (from the past 24 hour(s)) METABOLIC PANEL, BASIC Collection Time: 19  3:11 AM  
Result Value Ref Range Sodium 142 136 - 145 mmol/L Potassium 3.1 (L) 3.5 - 5.1 mmol/L Chloride 106 97 - 108 mmol/L  
 CO2 31 21 - 32 mmol/L Anion gap 5 5 - 15 mmol/L Glucose 87 65 - 100 mg/dL BUN 14 6 - 20 MG/DL Creatinine 0.89 0.55 - 1.02 MG/DL  
 BUN/Creatinine ratio 16 12 - 20 GFR est AA >60 >60 ml/min/1.73m2 GFR est non-AA 59 (L) >60 ml/min/1.73m2 Calcium 8.3 (L) 8.5 - 10.1 MG/DL Assessment:  
 
Hospital Problems  Date Reviewed: 2019 Codes Class Noted POA Altered mental status ICD-10-CM: R41.82 
ICD-9-CM: 780.97  2019 Unknown Plan/Recommendations/Medical Decision Making:  
 
- Continue ABX 
- Diet as tolerated - Cholecystostomy tube to gravity drainage.   If she continues to feel back, could do tube cholangiogram here while in the hospital to see if cystic duct is open but tube still needs to stay in 4 weeks from time of placement.   
- Care per primary team.  
 
Montserrat Pratt MD 
5/13/2019 
12:10 PM

## 2019-05-13 NOTE — PROGRESS NOTES
Hospitalist Progress Note Kody Sue DO Answering service: [phone] OR [phone] from in house phone Date of Service:  2019 NAME:  Edith Rojas YOB: 1924 MRN:  [id number] Admission Summary:  
80year old female presenting with altered mental status and found to have concern for cholecystitis. Interval history / Subjective:  
Patient seen and examined. Patient overall feels bad, unable to describe symptoms. Due to age and high risk, will continue to monitor in hospital. Possible d/c  pending clinical improvement. Unable to wean from oxygen today, will order chest x-ray. Assessment & Plan:  
 
Sepsis, secondary to cholecystitis with fluid collection:  
-CT abd/pelvis with 9.4 x 5.2 x 5 cm collection  
-s/p IR cholecystotomy tube 
-General surgery following, will need to keep in place for couple weeks, follow up 3 weeks. Cholangiogram as outpatient 
-Repeat CT scan without additional fluid collection  
-continue levaquin, flagyl, total 14 days 
-Diet as tolerated Left renal mass, 2.5 cm: appreciate urology review of CT scan, suspect stage I renal cell carcinoma, outpatient follow up with Dr. 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 
DVT prophylaxis: heparin Care Plan discussed with: Patient/Family Disposition: TBD needs PT reassessment Hospital Problems  Date Reviewed: 2019 Codes Class Noted POA Altered mental status ICD-10-CM: R41.82 
ICD-9-CM: 780.97  2019 Unknown Review of Systems:  
Negative unless stated above Vital Signs: Last 24hrs VS reviewed since prior progress note. Most recent are: 
Visit Vitals /78 Pulse 93 Temp 97.8 °F (36.6 °C) Resp 16 Ht 5' 3.74\" (1.619 m) Wt 65.2 kg (143 lb 11.8 oz) SpO2 98% BMI 24.87 kg/m² Intake/Output Summary (Last 24 hours) at 5/13/2019 1753 Last data filed at 5/13/2019 0700 Gross per 24 hour Intake 10 ml Output 620 ml Net -610 ml Physical Examination:  
 
 
     
Constitutional:  No acute distress, cooperative, pleasant   
ENT:  Oral mucous moist, oropharynx benign. Neck supple, Resp:  CTA bilaterally. No wheezing/rhonchi/rales. No accessory muscle use CV:  Regular rhythm, normal rate, no murmurs, gallops, rubs GI:  right cholecystomy tube in place, Soft, non distended, non tender Musculoskeletal:  No edema, warm, 2+ pulses throughout Neurologic:  Moves all extremities. Data Review:  
 Review and/or order of clinical lab test 
 
 
Labs:  
 
No results for input(s): WBC, HGB, HCT, PLT, HGBEXT, HCTEXT, PLTEXT, HGBEXT, HCTEXT, PLTEXT in the last 72 hours. Recent Labs 05/13/19 
9341 05/12/19 
0310  142  
K 3.1* 3.4*  
 110* CO2 31 24 BUN 14 13 CREA 0.89 0.81 GLU 87 86  
CA 8.3* 8.4* Recent Labs 05/13/19 
1200 SGOT 67* ALT 54 AP 65 TBILI 0.3 TP 5.4* ALB 2.3*  
GLOB 3.1 No results for input(s): INR, PTP, APTT in the last 72 hours. No lab exists for component: INREXT, INREXT No results for input(s): FE, TIBC, PSAT, FERR in the last 72 hours. No results found for: FOL, RBCF No results for input(s): PH, PCO2, PO2 in the last 72 hours. No results for input(s): CPK, CKNDX, TROIQ in the last 72 hours. No lab exists for component: CPKMB Lab Results Component Value Date/Time  Cholesterol, total 208 (H) 05/11/2009 12:15 PM  
 HDL Cholesterol 90 (H) 05/11/2009 12:15 PM  
 LDL, calculated 107.6 (H) 05/11/2009 12:15 PM  
 Triglyceride 52 05/11/2009 12:15 PM  
 CHOL/HDL Ratio 2.3 05/11/2009 12:15 PM  
 
Lab Results Component Value Date/Time Glucose (POC) 183 (H) 05/07/2019 10:57 AM  
 
Lab Results Component Value Date/Time Color YELLOW/STRAW 05/07/2019 11:42 AM  
 Appearance TURBID (A) 05/07/2019 11:42 AM  
 Specific gravity 1.021 05/07/2019 11:42 AM  
 pH (UA) 5.5 05/07/2019 11:42 AM  
 Protein 100 (A) 05/07/2019 11:42 AM  
 Glucose NEGATIVE  05/07/2019 11:42 AM  
 Ketone NEGATIVE  05/07/2019 11:42 AM  
 Bilirubin NEGATIVE  05/07/2019 11:42 AM  
 Urobilinogen 0.2 05/07/2019 11:42 AM  
 Nitrites POSITIVE (A) 05/07/2019 11:42 AM  
 Leukocyte Esterase LARGE (A) 05/07/2019 11:42 AM  
 Epithelial cells FEW 05/07/2019 11:42 AM  
 Bacteria 2+ (A) 05/07/2019 11:42 AM  
 WBC >100 (H) 05/07/2019 11:42 AM  
 RBC 5-10 05/07/2019 11:42 AM  
 
 
 
Medications Reviewed:  
 
Current Facility-Administered Medications Medication Dose Route Frequency  metroNIDAZOLE (FLAGYL) tablet 500 mg  500 mg Oral Q12H  levoFLOXacin (LEVAQUIN) tablet 750 mg  750 mg Oral Q48H  
 losartan (COZAAR) tablet 100 mg  100 mg Oral DAILY  hydroCHLOROthiazide (HYDRODIURIL) tablet 25 mg  25 mg Oral DAILY  ondansetron (ZOFRAN) injection 4 mg  4 mg IntraVENous Q8H PRN  
 albuterol-ipratropium (DUO-NEB) 2.5 MG-0.5 MG/3 ML  3 mL Nebulization Q6H PRN  
 azithromycin (ZITHROMAX) tablet 250 mg  250 mg Oral DAILY  pantoprazole (PROTONIX) tablet 40 mg  40 mg Oral ACB  ethambutol (MYAMBUTOL) tablet 800 mg  800 mg Oral DAILY  lactobac ac& pc-s.therm-b.anim (MANI Q/RISAQUAD)  1 Cap Oral DAILY  sodium chloride (NS) flush 5-40 mL  5-40 mL IntraVENous Q8H  
 sodium chloride (NS) flush 5-40 mL  5-40 mL IntraVENous PRN  
 acetaminophen (TYLENOL) suppository 650 mg  650 mg Rectal Q4H PRN  
 heparin (porcine) injection 5,000 Units  5,000 Units SubCUTAneous Q8H  
 arformoterol (BROVANA) neb solution 15 mcg  15 mcg Nebulization BID RT  And  
  budesonide (PULMICORT) 500 mcg/2 ml nebulizer suspension  500 mcg Nebulization BID RT  
 
______________________________________________________________________ EXPECTED LENGTH OF STAY: 4d 19h ACTUAL LENGTH OF STAY:          6 Kody Sue DO

## 2019-05-14 LAB
ALBUMIN SERPL-MCNC: 2.4 G/DL (ref 3.5–5)
ALBUMIN/GLOB SERPL: 0.7 {RATIO} (ref 1.1–2.2)
ALP SERPL-CCNC: 69 U/L (ref 45–117)
ALT SERPL-CCNC: 56 U/L (ref 12–78)
ANION GAP SERPL CALC-SCNC: 4 MMOL/L (ref 5–15)
AST SERPL-CCNC: 75 U/L (ref 15–37)
BILIRUB SERPL-MCNC: 0.3 MG/DL (ref 0.2–1)
BUN SERPL-MCNC: 11 MG/DL (ref 6–20)
BUN/CREAT SERPL: 14 (ref 12–20)
CALCIUM SERPL-MCNC: 9.1 MG/DL (ref 8.5–10.1)
CHLORIDE SERPL-SCNC: 104 MMOL/L (ref 97–108)
CO2 SERPL-SCNC: 32 MMOL/L (ref 21–32)
CREAT SERPL-MCNC: 0.79 MG/DL (ref 0.55–1.02)
ERYTHROCYTE [DISTWIDTH] IN BLOOD BY AUTOMATED COUNT: 12.5 % (ref 11.5–14.5)
GLOBULIN SER CALC-MCNC: 3.3 G/DL (ref 2–4)
GLUCOSE BLD STRIP.AUTO-MCNC: 108 MG/DL (ref 65–100)
GLUCOSE SERPL-MCNC: 100 MG/DL (ref 65–100)
HCT VFR BLD AUTO: 38.3 % (ref 35–47)
HGB BLD-MCNC: 12.7 G/DL (ref 11.5–16)
MCH RBC QN AUTO: 30.6 PG (ref 26–34)
MCHC RBC AUTO-ENTMCNC: 33.2 G/DL (ref 30–36.5)
MCV RBC AUTO: 92.3 FL (ref 80–99)
NRBC # BLD: 0 K/UL (ref 0–0.01)
NRBC BLD-RTO: 0 PER 100 WBC
PLATELET # BLD AUTO: 347 K/UL (ref 150–400)
PMV BLD AUTO: 10.4 FL (ref 8.9–12.9)
POTASSIUM SERPL-SCNC: 3.6 MMOL/L (ref 3.5–5.1)
PROT SERPL-MCNC: 5.7 G/DL (ref 6.4–8.2)
RBC # BLD AUTO: 4.15 M/UL (ref 3.8–5.2)
SERVICE CMNT-IMP: ABNORMAL
SODIUM SERPL-SCNC: 140 MMOL/L (ref 136–145)
WBC # BLD AUTO: 12.5 K/UL (ref 3.6–11)

## 2019-05-14 PROCEDURE — 94640 AIRWAY INHALATION TREATMENT: CPT

## 2019-05-14 PROCEDURE — 97530 THERAPEUTIC ACTIVITIES: CPT

## 2019-05-14 PROCEDURE — 97535 SELF CARE MNGMENT TRAINING: CPT

## 2019-05-14 PROCEDURE — 94760 N-INVAS EAR/PLS OXIMETRY 1: CPT

## 2019-05-14 PROCEDURE — 36415 COLL VENOUS BLD VENIPUNCTURE: CPT

## 2019-05-14 PROCEDURE — 74011250636 HC RX REV CODE- 250/636: Performed by: INTERNAL MEDICINE

## 2019-05-14 PROCEDURE — 74011000250 HC RX REV CODE- 250: Performed by: INTERNAL MEDICINE

## 2019-05-14 PROCEDURE — 65270000029 HC RM PRIVATE

## 2019-05-14 PROCEDURE — 94664 DEMO&/EVAL PT USE INHALER: CPT

## 2019-05-14 PROCEDURE — 74011250637 HC RX REV CODE- 250/637: Performed by: INTERNAL MEDICINE

## 2019-05-14 PROCEDURE — 80053 COMPREHEN METABOLIC PANEL: CPT

## 2019-05-14 PROCEDURE — 77010033678 HC OXYGEN DAILY

## 2019-05-14 PROCEDURE — 82962 GLUCOSE BLOOD TEST: CPT

## 2019-05-14 PROCEDURE — 77030020186 HC BOOT HL PROTCT SAGE -B

## 2019-05-14 PROCEDURE — 85027 COMPLETE CBC AUTOMATED: CPT

## 2019-05-14 RX ORDER — MICONAZOLE NITRATE 20 MG/G
CREAM TOPICAL 2 TIMES DAILY
Status: DISCONTINUED | OUTPATIENT
Start: 2019-05-14 | End: 2019-05-15 | Stop reason: HOSPADM

## 2019-05-14 RX ORDER — HYDROCORTISONE 1 %
CREAM (GRAM) TOPICAL 2 TIMES DAILY
Status: DISCONTINUED | OUTPATIENT
Start: 2019-05-14 | End: 2019-05-15 | Stop reason: HOSPADM

## 2019-05-14 RX ADMIN — Medication 1 CAPSULE: at 09:13

## 2019-05-14 RX ADMIN — ARFORMOTEROL TARTRATE 15 MCG: 15 SOLUTION RESPIRATORY (INHALATION) at 07:37

## 2019-05-14 RX ADMIN — LOSARTAN POTASSIUM 100 MG: 50 TABLET ORAL at 09:13

## 2019-05-14 RX ADMIN — HEPARIN SODIUM 5000 UNITS: 5000 INJECTION INTRAVENOUS; SUBCUTANEOUS at 02:59

## 2019-05-14 RX ADMIN — BUDESONIDE 500 MCG: 0.5 INHALANT RESPIRATORY (INHALATION) at 22:42

## 2019-05-14 RX ADMIN — HEPARIN SODIUM 5000 UNITS: 5000 INJECTION INTRAVENOUS; SUBCUTANEOUS at 17:58

## 2019-05-14 RX ADMIN — MICONAZOLE NITRATE: 20 CREAM TOPICAL at 17:59

## 2019-05-14 RX ADMIN — METRONIDAZOLE 500 MG: 250 TABLET ORAL at 09:13

## 2019-05-14 RX ADMIN — BUDESONIDE 500 MCG: 0.5 INHALANT RESPIRATORY (INHALATION) at 07:38

## 2019-05-14 RX ADMIN — HEPARIN SODIUM 5000 UNITS: 5000 INJECTION INTRAVENOUS; SUBCUTANEOUS at 09:14

## 2019-05-14 RX ADMIN — METRONIDAZOLE 500 MG: 250 TABLET ORAL at 21:32

## 2019-05-14 RX ADMIN — Medication 10 ML: at 06:54

## 2019-05-14 RX ADMIN — HYDROCORTISONE: 1 CREAM TOPICAL at 17:59

## 2019-05-14 RX ADMIN — Medication 10 ML: at 21:32

## 2019-05-14 RX ADMIN — ETHAMBUTOL HYDROCHLORIDE 800 MG: 400 TABLET, FILM COATED ORAL at 09:15

## 2019-05-14 RX ADMIN — PANTOPRAZOLE SODIUM 40 MG: 40 TABLET, DELAYED RELEASE ORAL at 06:54

## 2019-05-14 RX ADMIN — HYDROCHLOROTHIAZIDE 25 MG: 25 TABLET ORAL at 09:13

## 2019-05-14 RX ADMIN — ARFORMOTEROL TARTRATE 15 MCG: 15 SOLUTION RESPIRATORY (INHALATION) at 22:42

## 2019-05-14 RX ADMIN — Medication 10 ML: at 14:00

## 2019-05-14 RX ADMIN — AZITHROMYCIN 250 MG: 250 TABLET, FILM COATED ORAL at 09:13

## 2019-05-14 NOTE — PROGRESS NOTES
Patient's daughter called out a bunch of times this afternoon complaining of patient being in pain. Every time I would go into the patient's room to ask the patient about her pain, Patient would say \"I am fine. I am not in pain. \" Daughter is also concerned about patient's eating habits and drinking. Daughter thinks she is dehydrated. Patient has been eating and drinking for me throughout the day and urinating plenty. There was talk today about a possible palliative consult for the patient with patient's daughter, [address] [facility] nurse manager, and Dr. Enid Jorgensen, but no consult was put in. I will inform night nurse of these decisions about patient.

## 2019-05-14 NOTE — PROGRESS NOTES
Bedside and Verbal shift change report given to ALESSANDRA Patrick (oncoming nurse) by ALESSANDRA Alba (offgoing nurse). Report included the following information SBAR, Kardex, Procedure Summary, Intake/Output, MAR and Recent Results.

## 2019-05-14 NOTE — PROGRESS NOTES
Bedside shift change report given to [facility], [address] (oncoming nurse) by Adilson Hemphill RN (offgoing nurse). Report included the following information SBAR, Kardex, Procedure Summary, Intake/Output, MAR and Recent Results.

## 2019-05-14 NOTE — PROGRESS NOTES
Progress Note Patient: Rojas Potter MRN: [id number]  SSN: [id number] YOB: 1924  Age: 80 y.o. Sex: female Admit Date: 2019 * No surgery found * Procedure:   
 
Subjective:  
 
Patient feeling a little better today. No new complaints. Objective:  
 
Visit Vitals BP (!) 151/94 Pulse 87 Temp 97.7 °F (36.5 °C) Resp 18 Ht 5' 3.74\" (1.619 m) Wt 143 lb 11.8 oz (65.2 kg) SpO2 96% BMI 24.87 kg/m² Temp (24hrs), Av.8 °F (36.6 °C), Min:97.7 °F (36.5 °C), Max:98 °F (36.7 °C) Physical Exam:   
Gen- Resting comfortably Abd- s/nt/nd. Cholecystostomy tube with bilious output Data Review: images and reports reviewed Lab Review: All lab results for the last 24 hours reviewed. Recent Results (from the past 24 hour(s)) METABOLIC PANEL, COMPREHENSIVE Collection Time: 19  2:49 AM  
Result Value Ref Range Sodium 140 136 - 145 mmol/L Potassium 3.6 3.5 - 5.1 mmol/L Chloride 104 97 - 108 mmol/L  
 CO2 32 21 - 32 mmol/L Anion gap 4 (L) 5 - 15 mmol/L Glucose 100 65 - 100 mg/dL BUN 11 6 - 20 MG/DL Creatinine 0.79 0.55 - 1.02 MG/DL  
 BUN/Creatinine ratio 14 12 - 20 GFR est AA >60 >60 ml/min/1.73m2 GFR est non-AA >60 >60 ml/min/1.73m2 Calcium 9.1 8.5 - 10.1 MG/DL Bilirubin, total 0.3 0.2 - 1.0 MG/DL  
 ALT (SGPT) 56 12 - 78 U/L  
 AST (SGOT) 75 (H) 15 - 37 U/L Alk. phosphatase 69 45 - 117 U/L Protein, total 5.7 (L) 6.4 - 8.2 g/dL Albumin 2.4 (L) 3.5 - 5.0 g/dL Globulin 3.3 2.0 - 4.0 g/dL A-G Ratio 0.7 (L) 1.1 - 2.2    
CBC W/O DIFF Collection Time: 19  2:49 AM  
Result Value Ref Range WBC 12.5 (H) 3.6 - 11.0 K/uL  
 RBC 4.15 3.80 - 5.20 M/uL  
 HGB 12.7 11.5 - 16.0 g/dL HCT 38.3 35.0 - 47.0 % MCV 92.3 80.0 - 99.0 FL  
 MCH 30.6 26.0 - 34.0 PG  
 MCHC 33.2 30.0 - 36.5 g/dL  
 RDW 12.5 11.5 - 14.5 % PLATELET 825 776 - 300 K/uL MPV 10.4 8.9 - 12.9 FL  
 NRBC 0.0 0  WBC ABSOLUTE NRBC 0.00 0.00 - 0.01 K/uL GLUCOSE, POC Collection Time: 05/14/19  6:10 AM  
Result Value Ref Range Glucose (POC) 108 (H) 65 - 100 mg/dL Performed by Joni Miles Assessment:  
 
Hospital Problems  Date Reviewed: 2/19/2019 Codes Class Noted POA Altered mental status ICD-10-CM: R41.82 
ICD-9-CM: 780.97  5/7/2019 Unknown Plan/Recommendations/Medical Decision Making:  
 
- Continue ABX  
- Diet as tolerated. Encouraged supplements (Boost/Ensure/etc)  if appetite note great. - Cholecystostomy tube to gravity drainage. I will plan to set her up for tube cholangiogram in a couple more weeks to see if this can be removed at that time.    
- Disposition per primary team.  
 
Jayro Yusuf MD 
5/14/2019 
2:00 PM

## 2019-05-14 NOTE — PROGRESS NOTES
Problem: Mobility Impaired (Adult and Pediatric) Goal: *Acute Goals and Plan of Care (Insert Text) Description Physical Therapy Goals Initiated 5/9/2019 1. Patient will move from supine to sit and sit to supine  and roll side to side in bed with modified independence within 7 day(s). 2.  Patient will transfer from bed to chair and chair to bed with supervision/set-up using the least restrictive device within 7 day(s). 3.  Patient will perform sit to stand with supervision/set-up within 7 day(s). 4.  Patient will ambulate with supervision/set-up for 150 feet with the least restrictive device within 7 day(s). Outcome: Progressing Towards Goal 
 PHYSICAL THERAPY TREATMENT Patient: Yue Bowman (73 y.o. female) Date: 5/14/2019 Diagnosis: Altered mental status [R41.82] <principal problem not specified> Precautions:  Fall, Bed Alarm Chart, physical therapy assessment, plan of care and goals were reviewed. ASSESSMENT: 
Based on the objective data described below, the patient presents with Maximum to total assistance and Assist x2 level for bed mobility and maximum assist x 2 for sit to stand transfers. Pt stood in place with walker support with maximum assist x 2 due to posterior lean and had significant difficulty attempting to side step. Pt would fatigue quickly and sit down on bed. She completed 3 standing trials. The following are barriers to independence while in acute care:  
-Cognitive and/or behavioral: orientation, processing, initiation, safety awareness, insight into deficits and fear of falling 
-Medical condition: strength, functional reach, functional endurance, sitting balance, standing balance, medical history and motor planning   
-Other:    
 
Prior level of function: Pt resides at [facility]. Ambulated with a rollator.    
 
PLAN: 
Patient continues to benefit from skilled intervention to address the above impairments. Continue treatment per established plan of care. Recommend with staff: Positioning in bed to prevent skin breakdown and contractures. Recommend next PT session: mobility and pre gait activity Discharge recommendations: Rehab at skilled nursing facility (SNF) (to regain functional baseline patient requires rehab), pt will be returning to [facility] in the health care unit Equipment recommendations for successful discharge (if) home: owns rollator, SNF to provide equipment SUBJECTIVE:  
Patient stated ? What do you want me to do.? OBJECTIVE DATA SUMMARY:  
Critical Behavior: 
Neurologic State: Confused, Alert Orientation Level: Oriented to person Cognition: Memory loss, Impaired decision making Safety/Judgement: Decreased awareness of need for safety, Decreased awareness of need for assistance Functional Mobility Training: 
Bed Mobility: 
  
Supine to Sit: Maximum assistance;Assist x2; Additional time; Adaptive equipment Sit to Supine: Total assistance;Assist x2 Scooting: Moderate assistance;Assist x1 Transfers: 
Sit to Stand: Maximum assistance;Assist x2;Adaptive equipment, stood 3 times with rolling walker support, needs assistance to prevent feet from sliding forward and for strong posterior lean, tremulous, fatigues quickly, fearful of falling, pt would sit when therapist encouraged her to side step Stand to Sit: Moderate assistance;Assist x2 Balance: 
Sitting: Impaired; With support Sitting - Static: Fair (occasional) Sitting - Dynamic: Poor (constant support) Standing: Impaired; With support Standing - Static: Poor;Constant support Standing - Dynamic : Poor;Constant support Pain: No complaints Activity Tolerance:  
Poor Please refer to the flowsheet for vital signs taken during this treatment. After treatment patient left:  
Supine in bed Call light within reach Family at bedside COMMUNICATION/COLLABORATION:  
The patient?s plan of care was discussed with: Registered Nurse Angeline Germain Time Calculation: 24 mins

## 2019-05-14 NOTE — WOUND CARE
WOCN Note:  
 
New consult placed for assessment of perineal area. Chart reviewed. Admitted DX: Altered mental status [R41.82] Past Medical History:  hypertension, GERD, pneumonia, COPD, Mycobacterium avium complex infection. Assessment:  
Patient is alert, communicative and requires assist with repositioning. Bed: [facility] Patient wearing briefs for incontinence. Patient reports no pain. Patient repositioned on right side with pillow between the knees. Heels have blanching red erythema and are offloaded on Prevalon boots. 1.  Red itchy rash to back. 2.  Red moist rash to groin folds and perineum consistent with Candidiasis. Recommendations:   
Prevalon boots Turn team 
 
1. Hydrocortisone cream to back rash bid. 2.  Secura cream bid to perineum and groin folds. Skin Care & Pressure Prevention: 
Minimize layers of linen/pads under patient to optimize support surface. Turn/reposition approximately every 2 hours and offload heels. Manage incontinence / promote continence; Discussed above plan with RN. Transition of Care: Plan to follow as needed while admitted to hospital. 
 
Ranell Aschoff, BSN RN [facility] Wound Care Office [phone] Pager [phone]

## 2019-05-14 NOTE — PROGRESS NOTES
Problem: Self Care Deficits Care Plan (Adult) Goal: *Acute Goals and Plan of Care (Insert Text) Description Occupational Therapy Goals Initiated: 5/9/2019 1. Patient will perform grooming with supervision/set-up sitting in chair within 7 day(s). 2.  Patient will perform bathing with mod A from chair within 7 day(s). 3.  Patient will perform upper body dressing and lower body dressing with mod A within 7 day(s). 4.  Patient will perform toilet transfers with mod A within 7 day(s). 5.  Patient will perform all aspects of toileting with min A within 7 day(s). Outcome: Progressing Towards Goal 
 OCCUPATIONAL THERAPY TREATMENT Patient: Mikayla Lazaro (89 y.o. female) Date: 5/14/2019 Diagnosis: Altered mental status [R41.82] <principal problem not specified> Precautions: Contact, Fall, Bed Alarm Chart, occupational therapy assessment, plan of care, and goals were reviewed. ASSESSMENT: 
Patient continues to require MOD A-MAX A for functional bed mobility and sit <> stand transfers using walker requiring multi-modal cues for sequencing and problem-solving. Patient able to maintain sitting tolerance x MIN A for dynamic sitting balance for ~20 minutes in preparation for ADL tasks. Continue to recommend SNF rehab post discharge to maximize patient safety and independence with ADL transfers and tasks. Progression toward goals: 
?       Improving appropriately and progressing toward goals ? Improving slowly and progressing toward goals ? Not making progress toward goals and plan of care will be adjusted PLAN: 
Patient continues to benefit from skilled intervention to address the above impairments. Continue treatment per established plan of care. Discharge Recommendations:  Clifford Trejo Further Equipment Recommendations for Discharge:  TBD at rehab SUBJECTIVE:  
Patient stated ? You are pretty, I will do whatever you say. ? OBJECTIVE DATA SUMMARY:  
 Cognitive/Behavioral Status: 
Neurologic State: Confused; Alert Orientation Level: Oriented to person Cognition: Memory loss; Impaired decision making Perception: Appears intact Perseveration: No perseveration noted Safety/Judgement: Decreased awareness of need for safety;Decreased awareness of need for assistance Functional Mobility and Transfers for ADLs: 
Bed Mobility: 
Supine to Sit: Maximum assistance;Assist x1;Additional time Sit to Supine: Moderate assistance;Assist x1;Additional time Transfers: 
Sit to Stand: Maximum assistance;Assist x1;Additional time; Adaptive equipment Balance: 
Sitting: Impaired; Without support Sitting - Static: Fair (occasional) Sitting - Dynamic: Fair (occasional) Standing: Impaired; With support Standing - Static: Fair;Constant support Standing - Dynamic : Poor;Constant support ADL Intervention: 
Feeding Feeding Assistance: Supervision OT facilitated functional bed mobility for sit <> stand transfers with walker x 3 however patient with BLE weakness and difficulty understanding body mechanics for transfer. Patient requiring A for LB ADLs however working on sitting balance in preparation for seated unsupported ADL tasks. Lower Body Dressing Assistance Socks: Total assistance (dependent) Cognitive Retraining Safety/Judgement: Decreased awareness of need for safety;Decreased awareness of need for assistance Pain: 
Pain Scale 1: Numeric (0 - 10) Pain Intensity 1: 0 Activity Tolerance: VSS Please refer to the flowsheet for vital signs taken during this treatment. After treatment:  
? Patient left in no apparent distress sitting up in chair ? Patient left in no apparent distress in bed 
? Call bell left within reach ? Nursing notified ? Caregiver present ? Bed alarm activated COMMUNICATION/COLLABORATION:  
The patient?s plan of care was discussed with: Physical Therapist and Registered Nurse Luz Host Time Calculation: 28 mins

## 2019-05-15 VITALS
TEMPERATURE: 98.1 F | OXYGEN SATURATION: 97 % | SYSTOLIC BLOOD PRESSURE: 127 MMHG | BODY MASS INDEX: 24.54 KG/M2 | RESPIRATION RATE: 16 BRPM | HEART RATE: 92 BPM | HEIGHT: 64 IN | WEIGHT: 143.74 LBS | DIASTOLIC BLOOD PRESSURE: 81 MMHG

## 2019-05-15 LAB
ANION GAP SERPL CALC-SCNC: 6 MMOL/L (ref 5–15)
BASOPHILS # BLD: 0 K/UL (ref 0–0.1)
BASOPHILS NFR BLD: 0 % (ref 0–1)
BUN SERPL-MCNC: 10 MG/DL (ref 6–20)
BUN/CREAT SERPL: 13 (ref 12–20)
CALCIUM SERPL-MCNC: 8.6 MG/DL (ref 8.5–10.1)
CHLORIDE SERPL-SCNC: 104 MMOL/L (ref 97–108)
CO2 SERPL-SCNC: 30 MMOL/L (ref 21–32)
CREAT SERPL-MCNC: 0.75 MG/DL (ref 0.55–1.02)
DIFFERENTIAL METHOD BLD: ABNORMAL
EOSINOPHIL # BLD: 0.4 K/UL (ref 0–0.4)
EOSINOPHIL NFR BLD: 3 % (ref 0–7)
ERYTHROCYTE [DISTWIDTH] IN BLOOD BY AUTOMATED COUNT: 12.6 % (ref 11.5–14.5)
GLUCOSE SERPL-MCNC: 90 MG/DL (ref 65–100)
HCT VFR BLD AUTO: 37.4 % (ref 35–47)
HGB BLD-MCNC: 12.1 G/DL (ref 11.5–16)
IMM GRANULOCYTES # BLD AUTO: 0.1 K/UL (ref 0–0.04)
IMM GRANULOCYTES NFR BLD AUTO: 1 % (ref 0–0.5)
LYMPHOCYTES # BLD: 0.9 K/UL (ref 0.8–3.5)
LYMPHOCYTES NFR BLD: 8 % (ref 12–49)
MCH RBC QN AUTO: 29.5 PG (ref 26–34)
MCHC RBC AUTO-ENTMCNC: 32.4 G/DL (ref 30–36.5)
MCV RBC AUTO: 91.2 FL (ref 80–99)
MONOCYTES # BLD: 0.7 K/UL (ref 0–1)
MONOCYTES NFR BLD: 6 % (ref 5–13)
NEUTS SEG # BLD: 9.6 K/UL (ref 1.8–8)
NEUTS SEG NFR BLD: 82 % (ref 32–75)
NRBC # BLD: 0 K/UL (ref 0–0.01)
NRBC BLD-RTO: 0 PER 100 WBC
PLATELET # BLD AUTO: 350 K/UL (ref 150–400)
PMV BLD AUTO: 10.2 FL (ref 8.9–12.9)
POTASSIUM SERPL-SCNC: 3.4 MMOL/L (ref 3.5–5.1)
RBC # BLD AUTO: 4.1 M/UL (ref 3.8–5.2)
SODIUM SERPL-SCNC: 140 MMOL/L (ref 136–145)
WBC # BLD AUTO: 11.8 K/UL (ref 3.6–11)

## 2019-05-15 PROCEDURE — 77010033678 HC OXYGEN DAILY

## 2019-05-15 PROCEDURE — 94640 AIRWAY INHALATION TREATMENT: CPT

## 2019-05-15 PROCEDURE — 97535 SELF CARE MNGMENT TRAINING: CPT

## 2019-05-15 PROCEDURE — 74011000250 HC RX REV CODE- 250: Performed by: INTERNAL MEDICINE

## 2019-05-15 PROCEDURE — 97530 THERAPEUTIC ACTIVITIES: CPT

## 2019-05-15 PROCEDURE — 94760 N-INVAS EAR/PLS OXIMETRY 1: CPT

## 2019-05-15 PROCEDURE — 74011250637 HC RX REV CODE- 250/637: Performed by: INTERNAL MEDICINE

## 2019-05-15 PROCEDURE — 74011250636 HC RX REV CODE- 250/636: Performed by: INTERNAL MEDICINE

## 2019-05-15 PROCEDURE — 80048 BASIC METABOLIC PNL TOTAL CA: CPT

## 2019-05-15 PROCEDURE — 36415 COLL VENOUS BLD VENIPUNCTURE: CPT

## 2019-05-15 PROCEDURE — 85025 COMPLETE CBC W/AUTO DIFF WBC: CPT

## 2019-05-15 RX ORDER — LEVOFLOXACIN 750 MG/1
750 TABLET ORAL
Qty: 4 TAB | Refills: 0 | Status: SHIPPED | OUTPATIENT
Start: 2019-05-17 | End: 2019-05-25

## 2019-05-15 RX ORDER — PANTOPRAZOLE SODIUM 40 MG/1
40 TABLET, DELAYED RELEASE ORAL
Qty: 90 TAB | Refills: 0 | Status: SHIPPED | OUTPATIENT
Start: 2019-05-16

## 2019-05-15 RX ORDER — METRONIDAZOLE 500 MG/1
500 TABLET ORAL EVERY 12 HOURS
Qty: 20 TAB | Refills: 0 | Status: SHIPPED | OUTPATIENT
Start: 2019-05-15 | End: 2019-05-25

## 2019-05-15 RX ADMIN — ETHAMBUTOL HYDROCHLORIDE 800 MG: 400 TABLET, FILM COATED ORAL at 09:08

## 2019-05-15 RX ADMIN — PANTOPRAZOLE SODIUM 40 MG: 40 TABLET, DELAYED RELEASE ORAL at 06:32

## 2019-05-15 RX ADMIN — AZITHROMYCIN 250 MG: 250 TABLET, FILM COATED ORAL at 09:05

## 2019-05-15 RX ADMIN — Medication 10 ML: at 06:33

## 2019-05-15 RX ADMIN — LOSARTAN POTASSIUM 100 MG: 50 TABLET ORAL at 09:05

## 2019-05-15 RX ADMIN — HYDROCHLOROTHIAZIDE 25 MG: 25 TABLET ORAL at 09:05

## 2019-05-15 RX ADMIN — HEPARIN SODIUM 5000 UNITS: 5000 INJECTION INTRAVENOUS; SUBCUTANEOUS at 09:05

## 2019-05-15 RX ADMIN — Medication 1 CAPSULE: at 09:05

## 2019-05-15 RX ADMIN — MICONAZOLE NITRATE: 20 CREAM TOPICAL at 09:09

## 2019-05-15 RX ADMIN — LEVOFLOXACIN 750 MG: 750 TABLET, FILM COATED ORAL at 09:05

## 2019-05-15 RX ADMIN — ARFORMOTEROL TARTRATE 15 MCG: 15 SOLUTION RESPIRATORY (INHALATION) at 10:24

## 2019-05-15 RX ADMIN — HYDROCORTISONE: 1 CREAM TOPICAL at 09:08

## 2019-05-15 RX ADMIN — BUDESONIDE 500 MCG: 0.5 INHALANT RESPIRATORY (INHALATION) at 10:24

## 2019-05-15 RX ADMIN — METRONIDAZOLE 500 MG: 250 TABLET ORAL at 09:05

## 2019-05-15 RX ADMIN — HEPARIN SODIUM 5000 UNITS: 5000 INJECTION INTRAVENOUS; SUBCUTANEOUS at 01:45

## 2019-05-15 NOTE — DISCHARGE SUMMARY
Discharge Summary       PATIENT ID: Hema Stafford  MRN: [id number]   YOB: 1924    DATE OF ADMISSION: 5/7/2019 10:48 AM    DATE OF DISCHARGE:   PRIMARY CARE PROVIDER: Aurelio Mars MD     ATTENDING PHYSICIAN: Clif Santana   DISCHARGING PROVIDER: Brittno Jacques MD    To contact this individual call [phone] and ask the  to page. If unavailable ask to be transferred the Adult Hospitalist Department. CONSULTATIONS: IP CONSULT TO GENERAL SURGERY  IP CONSULT TO HOSPITALIST  IP CONSULT TO UROLOGY  IP CONSULT TO PULMONOLOGY    PROCEDURES/SURGERIES: * No surgery found *    ADMITTING [facility] COURSE:     The patient is a 80-year-old female with past medical history of hypertension, GERD, pneumonia, COPD, Mycobacterium avium complex infection, who presents to the hospital with the above-mentioned symptom. History was primarily obtained from the daughters, who is present at the bedside. Her daughters report that today the patient was at her baseline, had her breakfast at around 09:30, and at around 10:00 a.m., became confused and disoriented. Per daughters, she did not completely pass out or lost consciousness, and she was responsive. Per ER physician, patient was found to be unresponsive. Regardless, EMS got there. They found the patient with blood pressure of 175/90. The patient was mildly hypoxic with pulse ox of 64% on room air, which improved by placing a nasal trumpet and O2 mask. The patient was started on Rocephin last night because they were concerned for a UTI and that she may have had an allergic reaction. She was given Benadryl. The patient was brought to the hospital and was requested to be admitted under the Hospitalist Service. When I went to evaluate the patient, she was quite confused, very lethargic, opening eyes to verbal commands, but not really answering much questions. The daughters report that usually she is alert and awake.   She is clutching her abdomen and appears to be in pain, does not answer much of our questions and no review of symptoms could be done because of the patient's altered mental status. Recent Results (from the past 24 hour(s))   CBC WITH AUTOMATED DIFF    Collection Time: 05/15/19  3:20 AM   Result Value Ref Range    WBC 11.8 (H) 3.6 - 11.0 K/uL    RBC 4.10 3.80 - 5.20 M/uL    HGB 12.1 11.5 - 16.0 g/dL    HCT 37.4 35.0 - 47.0 %    MCV 91.2 80.0 - 99.0 FL    MCH 29.5 26.0 - 34.0 PG    MCHC 32.4 30.0 - 36.5 g/dL    RDW 12.6 11.5 - 14.5 %    PLATELET 517 994 - 744 K/uL    MPV 10.2 8.9 - 12.9 FL    NRBC 0.0 0  WBC    ABSOLUTE NRBC 0.00 0.00 - 0.01 K/uL    NEUTROPHILS 82 (H) 32 - 75 %    LYMPHOCYTES 8 (L) 12 - 49 %    MONOCYTES 6 5 - 13 %    EOSINOPHILS 3 0 - 7 %    BASOPHILS 0 0 - 1 %    IMMATURE GRANULOCYTES 1 (H) 0.0 - 0.5 %    ABS. NEUTROPHILS 9.6 (H) 1.8 - 8.0 K/UL    ABS. LYMPHOCYTES 0.9 0.8 - 3.5 K/UL    ABS. MONOCYTES 0.7 0.0 - 1.0 K/UL    ABS. EOSINOPHILS 0.4 0.0 - 0.4 K/UL    ABS. BASOPHILS 0.0 0.0 - 0.1 K/UL    ABS. IMM. GRANS. 0.1 (H) 0.00 - 0.04 K/UL    DF AUTOMATED     METABOLIC PANEL, BASIC    Collection Time: 05/15/19  3:20 AM   Result Value Ref Range    Sodium 140 136 - 145 mmol/L    Potassium 3.4 (L) 3.5 - 5.1 mmol/L    Chloride 104 97 - 108 mmol/L    CO2 30 21 - 32 mmol/L    Anion gap 6 5 - 15 mmol/L    Glucose 90 65 - 100 mg/dL    BUN 10 6 - 20 MG/DL    Creatinine 0.75 0.55 - 1.02 MG/DL    BUN/Creatinine ratio 13 12 - 20      GFR est AA >60 >60 ml/min/1.73m2    GFR est non-AA >60 >60 ml/min/1.73m2    Calcium 8.6 8.5 - 10.1 MG/DL       Hospital course       Sepsis, secondary to cholecystitis with fluid collection:   -CT abd/pelvis with 9.4 x 5.2 x 5 cm collection   -s/p IR cholecystotomy tube  -General surgery following, will need to keep in place for couple weeks, follow up 3 weeks.  Cholangiogram as outpatient  -Repeat CT scan without additional fluid collection   -continue levaquin, flagyl, total 14 days  -Diet as tolerated, she has very poor nutrition      Left renal mass, 2.5 cm:   appreciate urology review of CT scan, suspect stage I renal cell carcinoma, outpatient follow up with Dr. Maria Del Carmen Thakur, the daughter Salina Alvarez want pursue further given patient age and condition      Acute hypoxic respiratory failure:   unable to wean, order chest x-ray 5/13, shows persist PNA as seen on admission and right large pleural effusion   This effusion could be from cholecystitis vs parapneumonia [less likely given b/l , though r>l]  Will ask daughter if she agrees for thoracocentesis in am   Consulted pulmonology and they feel effusion need not be tapped   Her wbc count came down and she is not having fever   Suspected it is reactive from cholecystitis      Pneumonia: POA   continue antibiotics, levaquin  Blood culture NGTD   Resolved      Citrobacter UTI:   sensitive to levaquin      Hypokalemia:   replace as needed     History of MAC:   continue home ethambutol, azithromycin     Dementia:   supportive care     Hypertension:    Losartan, HCTZ     Body mass index is 24.87 kg/m². Hypokalemia and hypoalbuminemia      Code status: dnr      DISCHARGE DIAGNOSES / PLAN:      Patient is stable medically for discharge but the daughter understands that she is always at risk of decline given her age and her medical problems   Hospice is offered but daughter wants to hold it off for now   No active infection seen in the patient      ADDITIONAL CARE RECOMMENDATIONS:   Plan:   Cholecystitis with cholecystotomy tube placement:  -Need two weeks antibiotics total,  -Follow up with Dr. Rosalio Gonzalez in his office in 3 weeks. His office will schedule cholangiogram to evaluate tube. Renal mass:  -Follow up with Dr. Maria Del Carmen Thakur (urologist). There is concern that the mass is renal cell carcinoma. You will need future scans to evaluate the mass.      Urinary tract infection and pneumonia: will be treated with same antibiotics         PENDING TEST RESULTS:   At the time of discharge the following test results are still pending:     FOLLOW UP APPOINTMENTS:    Follow-up Information     Follow up With Specialties Details Why Contact Info    Monae Curran MD Urology In 2 weeks Schedule follow up appointment [facility]  [phone]      Brent Marsh MD General Surgery, Surgical Oncology, Colon and Rectal Surgery In 1 week Schedule follow up appointment in 3 weeks  [address] [facility] [address]  [phone]      Juno Mondragon MD Family Practice In 1 week  [phone]               DIET: Regular Diet  Oral Nutritional Supplements:     ACTIVITY: Activity as tolerated    WOUND CARE:     EQUIPMENT needed:       DISCHARGE MEDICATIONS:  Current Discharge Medication List      START taking these medications    Details   pantoprazole (PROTONIX) 40 mg tablet Take 1 Tab by mouth Daily (before breakfast). Qty: 90 Tab, Refills: 0      levoFLOXacin (LEVAQUIN) 750 mg tablet Take 1 Tab by mouth every fourty-eight (48) hours for 8 days. Qty: 4 Tab, Refills: 0      metroNIDAZOLE (FLAGYL) 500 mg tablet Take 1 Tab by mouth every twelve (12) hours for 10 days. Qty: 20 Tab, Refills: 0         CONTINUE these medications which have NOT CHANGED    Details   benzonatate (TESSALON) 200 mg capsule Take 200 mg by mouth three (3) times daily as needed for Cough. valsartan-hydroCHLOROthiazide (DIOVAN-HCT) 160-25 mg per tablet Take 1 Tab by mouth daily. azithromycin (ZITHROMAX) 250 mg tablet Take 250 mg by mouth daily. omeprazole (PRILOSEC) 20 mg capsule Take 20 mg by mouth daily. fluticasone-vilanterol (BREO ELLIPTA) 100-25 mcg/dose inhaler Take 1 Puff by inhalation daily. albuterol-ipratropium (DUO-NEB) 2.5 mg-0.5 mg/3 ml nebu 3 mL by Nebulization route every six (6) hours as needed. ethambutol (MYAMBUTOL) 400 mg tablet Take 800 mg by mouth daily.  Indications: pulmonary myobacterial infection      Saccharomyces boulardii (FLORASTOR) 250 mg capsule Take 250 mg by mouth daily. loperamide (IMODIUM) 2 mg capsule Take 2 mg by mouth as needed for Diarrhea (after every loose stool, maximum of 6 caps/24 hours). meclizine (ANTIVERT) 12.5 mg tablet Take 12.5 mg by mouth nightly as needed. Indications: Vertigo      multivitamin (ONE A DAY) tablet Take 1 Tab by mouth daily. guaiFENesin (ROBITUSSIN MUCUS-CHEST CONGEST) 100 mg/5 mL liquid Take 200 mg by mouth every four (4) hours as needed for Cough. peg 400-propylene glycol (SYSTANE, PROPYLENE GLYCOL,) 0.4-0.3 % drop 1 Drop three (3) times daily. cholecalciferol, vitamin D3, (VITAMIN D3) 2,000 unit tab Take 1 Tab by mouth daily. acetaminophen (TYLENOL) 325 mg tablet Take 2 Tabs by mouth every four (4) hours as needed. Qty: 30 Tab, Refills: 0         STOP taking these medications       oxyCODONE-acetaminophen (PERCOCET) 5-325 mg per tablet Comments:   Reason for Stopping:                 NOTIFY YOUR PHYSICIAN FOR ANY OF THE FOLLOWING:   Fever over 101 degrees for 24 hours. Chest pain, shortness of breath, fever, chills, nausea, vomiting, diarrhea, change in mentation, falling, weakness, bleeding. Severe pain or pain not relieved by medications. Or, any other signs or symptoms that you may have questions about.     DISPOSITION:    Home With:   OT  PT  HH  RN      xx Long term SNF/Inpatient Rehab    Independent/assisted living    Hospice    Other:       PATIENT CONDITION AT DISCHARGE:     Functional status    Poor    x Deconditioned     Independent      Cognition     Lucid    x Forgetful     Dementia      Catheters/lines (plus indication)    K[place]s     PICC     PEG    x None      Code status   x  Full code     DNR      PHYSICAL EXAMINATION AT DISCHARGE:   Refer to Progress Note  Visit Vitals  /79 (BP 1 Location: Left arm, BP Patient Position: At rest)   Pulse 96   Temp 98 °F (36.7 °C)   Resp 16   Ht 5' 3.74\" (1.619 m)   Wt 65.2 kg (143 lb 11.8 oz)   SpO2 94%   BMI 24.87 kg/m²     WEAK   cvs s1s2  Chest clear   abd soft   Legs no edema         CHRONIC MEDICAL DIAGNOSES:  Problem List as of 5/15/2019 Date Reviewed: 2019          Codes Class Noted - Resolved    Altered mental status ICD-10-CM: R41.82  ICD-9-CM: 780.97  2019 - Present        S/P cardiac pacemaker procedure ICD-10-CM: Z95.0  ICD-9-CM: V45.01  2017 - Present    Overview Signed 2017  8:09 AM by DONALD [address]   17             Complete heart block ([facility] [address] ICD-10-CM: I44.2  ICD-9-CM: 426.0  2017 - Present        Healthcare-associated pneumonia ICD-10-CM: J18.9  ICD-9-CM: 486  2016 - Present        RESOLVED: Acute encephalopathy ICD-10-CM: G93.40  ICD-9-CM: 348.30  2016 - 2016        RESOLVED: Metabolic encephalopathy UUE-93-NP: G93.41  ICD-9-CM: 348.31  2016 - 2016        RESOLVED: Delirium (Chronic) ICD-10-CM: R41.0  ICD-9-CM: 780.09  2016 - 2016              Greater than  30  minutes were spent with the patient on counseling and coordination of care    Signed:   Gopal Bloom MD  5/15/2019  3:00 PM

## 2019-05-15 NOTE — CONSULTS
[facility]  Pulmonary, Critical Care, and Sleep Medicine    Initial Patient Consult    Name: Trav Noriega MRN: [id number]   : 1924 Hospital: [place] [address]   Date: 5/15/2019        IMPRESSION:   · Right effusion- suspect that this is subphrenic from acute cholecystitis. Less suspicious for PNA/parapneumonic effusion. RLL ATX evident on CT abd. · Acute cholecystitis s/p perc kinsey  · abd collection s/p drain  · Sepsis  · H/o pulm MAC followed at [facility]  · Dementia      RECOMMENDATIONS:   · Would follow right effusion with CXR tap only if symptomatic or WBC/fever curve worse. · IS/OOB  · abx per primary team  · SUP  · Nutrition  · Pt is acutely ill and at risk for decline due to sepsis/MODS     Subjective: This patient has been seen and evaluated at the request of Dr. Jesse Stewart for right > left effusion. Patient is a 80 y.o. female admitted  with cholecystitis and abd pain. Underwent perc kinsey and additional drain for 9 cm abd abscess. On abx and followed by gsurg.  Consulted for right effusion      Past Medical History:   Diagnosis Date    Bronchiectasis ([address]     Cancer ([address]     SCC    Dementia     Gastrointestinal disorder     benign tumor in colon   /  h/o Celiac disease    GERD (gastroesophageal reflux disease)     Hx of colonic polyps     Hypertension     Kidney stones     Mastoiditis     Mycobacterium avium infection ([address]     Other ill-defined conditions(799.89)     microbacterium isidra- intracellular lung disease    PNA (pneumonia)     Vertigo       Past Surgical History:   Procedure Laterality Date    ABDOMEN SURGERY PROC UNLISTED      colon tumor removed     HX COLECTOMY      HX GI      PARTIAL COLON RESECTION    HX HEENT      AS A CHILD    HX UROLOGICAL      bladder sling     IR CHOLECYSTOSTOMY PERCUTANEOUS  2019    TX INS NEW/RPLCMT PRM PM W/TRANSV ELTRD ATRIAL&VENT  2017           Prior to Admission medications Medication Sig Start Date End Date Taking? Authorizing Provider   oxyCODONE-acetaminophen (PERCOCET) 5-325 mg per tablet Take 1 Tab by mouth every six (6) hours as needed for Pain. Max Daily Amount: 4 Tabs. 2/19/19   Leesa Brown MD   benzonatate (TESSALON) 200 mg capsule Take 200 mg by mouth three (3) times daily as needed for Cough. Provider, Historical   valsartan-hydroCHLOROthiazide (DIOVAN-HCT) 160-25 mg per tablet Take 1 Tab by mouth daily. Provider, Historical   azithromycin (ZITHROMAX) 250 mg tablet Take 250 mg by mouth daily. Provider, Historical   omeprazole (PRILOSEC) 20 mg capsule Take 20 mg by mouth daily. Provider, Historical   fluticasone-vilanterol (BREO ELLIPTA) 100-25 mcg/dose inhaler Take 1 Puff by inhalation daily. Provider, Historical   albuterol-ipratropium (DUO-NEB) 2.5 mg-0.5 mg/3 ml nebu 3 mL by Nebulization route every six (6) hours as needed. Provider, Historical   ethambutol (MYAMBUTOL) 400 mg tablet Take 800 mg by mouth daily. Indications: pulmonary myobacterial infection    Provider, Historical   Saccharomyces boulardii (FLORASTOR) 250 mg capsule Take 250 mg by mouth daily. Provider, Historical   loperamide (IMODIUM) 2 mg capsule Take 2 mg by mouth as needed for Diarrhea (after every loose stool, maximum of 6 caps/24 hours). Provider, Historical   meclizine (ANTIVERT) 12.5 mg tablet Take 12.5 mg by mouth nightly as needed. Indications: Vertigo    Provider, Historical   multivitamin (ONE A DAY) tablet Take 1 Tab by mouth daily. Provider, Historical   guaiFENesin (ROBITUSSIN MUCUS-CHEST CONGEST) 100 mg/5 mL liquid Take 200 mg by mouth every four (4) hours as needed for Cough. Provider, Historical   peg 400-propylene glycol (SYSTANE, PROPYLENE GLYCOL,) 0.4-0.3 % drop 1 Drop three (3) times daily. Provider, Historical   cholecalciferol, vitamin D3, (VITAMIN D3) 2,000 unit tab Take 1 Tab by mouth daily.     Provider, Historical   acetaminophen (TYLENOL) 325 mg tablet Take 2 Tabs by mouth every four (4) hours as needed. Patient taking differently: Take 650 mg by mouth every four (4) hours as needed for Pain. 1/27/16   Km Chacon MD     No Known Allergies   Social History     Tobacco Use    Smoking status: Never Smoker    Smokeless tobacco: Never Used   Substance Use Topics    Alcohol use: No      Family History   Problem Relation Age of Onset    No Known Problems Daughter     No Known Problems Daughter     No Known Problems Daughter     No Known Problems Daughter     Anesth Problems Neg Hx         Current Facility-Administered Medications   Medication Dose Route Frequency    miconazole (SECURA) 2 % extra thick cream   Topical BID    hydrocortisone (CORTAID) 1 % cream   Topical BID    metroNIDAZOLE (FLAGYL) tablet 500 mg  500 mg Oral Q12H    levoFLOXacin (LEVAQUIN) tablet 750 mg  750 mg Oral Q48H    losartan (COZAAR) tablet 100 mg  100 mg Oral DAILY    hydroCHLOROthiazide (HYDRODIURIL) tablet 25 mg  25 mg Oral DAILY    azithromycin (ZITHROMAX) tablet 250 mg  250 mg Oral DAILY    pantoprazole (PROTONIX) tablet 40 mg  40 mg Oral ACB    ethambutol (MYAMBUTOL) tablet 800 mg  800 mg Oral DAILY    lactobac ac& pc-s.therm-b.anim (MANI Q/RISAQUAD)  1 Cap Oral DAILY    sodium chloride (NS) flush 5-40 mL  5-40 mL IntraVENous Q8H    heparin (porcine) injection 5,000 Units  5,000 Units SubCUTAneous Q8H    arformoterol (BROVANA) neb solution 15 mcg  15 mcg Nebulization BID RT    And    budesonide (PULMICORT) 500 mcg/2 ml nebulizer suspension  500 mcg Nebulization BID RT       Review of Systems:  Review of systems not obtained due to patient factors.     Objective:   Vital Signs:    Visit Vitals  /79 (BP 1 Location: Left arm, BP Patient Position: At rest)   Pulse 96   Temp 98 °F (36.7 °C)   Resp 16   Ht 5' 3.74\" (1.619 m) Comment: From documentation on 2/19/19   Wt 65.2 kg (143 lb 11.8 oz)   SpO2 94%   BMI 24.87 kg/m²       O2 Device: Nasal cannula   O2 Flow Rate (L/min): 1 l/min   Temp (24hrs), Av.9 °F (36.6 °C), Min:97.3 °F (36.3 °C), Max:98.2 °F (36.8 °C)       Intake/Output:   Last shift:      05/15 0701 - 05/15 1900  In: 240 [P.O.:240]  Out: 50 [Drains:50]  Last 3 shifts: 1901 - 05/15 0700  In: -   Out: 300 [Urine:300]    Intake/Output Summary (Last 24 hours) at 5/15/2019 1438  Last data filed at 5/15/2019 1119  Gross per 24 hour   Intake 240 ml   Output 50 ml   Net 190 ml      Physical Exam:   General:  Alert, cooperative, no distress, appears stated age. Head:  Normocephalic, without obvious abnormality, atraumatic. Eyes:  Conjunctivae/corneas clear. PERRL, EOMs intact. Nose: Nares normal. Septum midline       Neck: Supple, symmetrical, trachea midline       Lungs:   Dec BS at bases no wheeze        Heart:  Regular rate and rhythm, S1, S2 normal, no murmur, click, rub or gallop. Abdomen:   Soft, non-tender. Bowel sounds normal. No masses,  No organomegaly. Extremities: Extremities normal, atraumatic, no cyanosis or edema. Pulses: 2+ and symmetric all extremities. Skin: Skin color, texture, turgor normal. No rashes or lesions             Data review:     Recent Results (from the past 24 hour(s))   CBC WITH AUTOMATED DIFF    Collection Time: 05/15/19  3:20 AM   Result Value Ref Range    WBC 11.8 (H) 3.6 - 11.0 K/uL    RBC 4.10 3.80 - 5.20 M/uL    HGB 12.1 11.5 - 16.0 g/dL    HCT 37.4 35.0 - 47.0 %    MCV 91.2 80.0 - 99.0 FL    MCH 29.5 26.0 - 34.0 PG    MCHC 32.4 30.0 - 36.5 g/dL    RDW 12.6 11.5 - 14.5 %    PLATELET 460 630 - 450 K/uL    MPV 10.2 8.9 - 12.9 FL    NRBC 0.0 0  WBC    ABSOLUTE NRBC 0.00 0.00 - 0.01 K/uL    NEUTROPHILS 82 (H) 32 - 75 %    LYMPHOCYTES 8 (L) 12 - 49 %    MONOCYTES 6 5 - 13 %    EOSINOPHILS 3 0 - 7 %    BASOPHILS 0 0 - 1 %    IMMATURE GRANULOCYTES 1 (H) 0.0 - 0.5 %    ABS. NEUTROPHILS 9.6 (H) 1.8 - 8.0 K/UL    ABS. LYMPHOCYTES 0.9 0.8 - 3.5 K/UL    ABS. MONOCYTES 0.7 0.0 - 1.0 K/UL    ABS. EOSINOPHILS 0.4 0.0 - 0.4 K/UL    ABS. BASOPHILS 0.0 0.0 - 0.1 K/UL    ABS. IMM.  GRANS. 0.1 (H) 0.00 - 0.04 K/UL    DF AUTOMATED     METABOLIC PANEL, BASIC    Collection Time: 05/15/19  3:20 AM   Result Value Ref Range    Sodium 140 136 - 145 mmol/L    Potassium 3.4 (L) 3.5 - 5.1 mmol/L    Chloride 104 97 - 108 mmol/L    CO2 30 21 - 32 mmol/L    Anion gap 6 5 - 15 mmol/L    Glucose 90 65 - 100 mg/dL    BUN 10 6 - 20 MG/DL    Creatinine 0.75 0.55 - 1.02 MG/DL    BUN/Creatinine ratio 13 12 - 20      GFR est AA >60 >60 ml/min/1.73m2    GFR est non-AA >60 >60 ml/min/1.73m2    Calcium 8.6 8.5 - 10.1 MG/DL       Imaging:  I have personally reviewed the patients radiographs and have reviewed the reports:  CT lung cuts  right > left basilar effusions with RLL ATX ( rounded)         Kathie Bejarano MD

## 2019-05-15 NOTE — PROGRESS NOTES
Cm informed that patient is ready for discharge today and will be returning to [facility]. Fam spoke with Felipe Winn,  at [employer]. and she confirmed acceptance of her return. Cm discussed with patients' daughter and sent referral to [facility] transport for a 5 pm  and they responded they could come at that time.   
Sancho SIDDIQUI, ACM

## 2019-05-15 NOTE — PROGRESS NOTES
Patient discharged to SNF. Report called to Nurse [facility] LPN. Opportunities for questions given at this time. Peripheral IV's discontinued, tip intact, pt tolerated well. Daughter at bedside. Medical transport at bedside. Pt off unit via stretcher.  [facility] to transport pt to SNF

## 2019-05-15 NOTE — PROGRESS NOTES
Problem: Mobility Impaired (Adult and Pediatric) Goal: *Acute Goals and Plan of Care (Insert Text) Description Physical Therapy Goals Initiated 5/9/2019 1. Patient will move from supine to sit and sit to supine  and roll side to side in bed with modified independence within 7 day(s). 2.  Patient will transfer from bed to chair and chair to bed with supervision/set-up using the least restrictive device within 7 day(s). 3.  Patient will perform sit to stand with supervision/set-up within 7 day(s). 4.  Patient will ambulate with supervision/set-up for 150 feet with the least restrictive device within 7 day(s). Outcome: Progressing Towards Goal 
 
PHYSICAL THERAPY TREATMENT Patient: Dariel Romero (57 y.o. female) Date: 5/15/2019 Diagnosis: Altered mental status [R41.82] <principal problem not specified> Precautions: Contact, Fall, Bed Alarm Chart, physical therapy assessment, plan of care and goals were reviewed. ASSESSMENT: 
Based on the objective data described below, the patient presents with Maximum assistance level overall for transfers to EOB and for standing/attempts to transfer to chair. Pt able to balance EOB and perform scooting to EOB with min A, however highly resistant to mobility and movement with c/o wanting to sleep. Initially dtr wants pt in chair, however becomes amenable to returning to supine/rest with discussions regarding comfort measures vs attempting gains in mobility. Discussed sitting EOB or chair when pt amenable for digestion, increasing chest wall expansion, and decreasing pain in muscles. Dtr wishes to transfer home to [place] [address] for hospice. The following are barriers to independence while in acute care:  
-Cognitive and/or behavioral: orientation, attention to task, command following, processing, safety awareness, insight into deficits and insight into abilities -Medical condition: strength, functional reach, functional endurance, standing balance, medical history and coordination   
-Other: PLAN: 
Patient continues to benefit from skilled intervention to address the above impairments. Continue treatment per established plan of care. Recommend with staff: up in chair/chair position as able Recommend next PT session: progress with education, eventually DC to hospice care. Discharge recommendations: Long term care If above is not an option then recommend: To be determined between the prior selections, based on patient progress during hospital stay Patient's barriers to discharging home, in addition to above impairments: family availability to assist 
entry and exit into the home 
family availability for education/training to then follow up at home 
level of physical assist required to maintain patient safety. Equipment recommendations for successful discharge (if) home: TBD SUBJECTIVE:  
Patient stated I am not going to win this one.  discussed and acknowledged pts end of life views. discussed with dtr OBJECTIVE DATA SUMMARY:  
Critical Behavior: 
Neurologic State: Alert, Confused Orientation Level: Oriented to person Cognition: Memory loss Safety/Judgement: Decreased awareness of need for safety, Decreased awareness of need for assistance Functional Mobility Training: 
Bed Mobility: 
  
Supine to Sit: Maximum assistance Sit to Supine: Maximum assistance Scooting: Maximum assistance Transfers: 
Sit to Stand: Maximum assistance Stand to Sit: Maximum assistance Balance: 
Sitting: Impaired Sitting - Static: Fair (occasional) Sitting - Dynamic: Fair (occasional)(SBA) Standing: Impaired Standing - Static: Poor Standing - Dynamic : Poor Ambulation/Gait Training: 
  
  
  
  
  
  
 Pain: 
Pt denies pain at this time Activity Tolerance:  
Fair, desaturates with exertion and requires oxygen and requires frequent rest breaks Please refer to the flowsheet for vital signs taken during this treatment. After treatment patient left:  
Supine in bed Caregiver at bedside Call light within reach RN notified COMMUNICATION/COLLABORATION:  
The patients plan of care was discussed with:  Zully Young, PT Time Calculation: 29 mins

## 2019-05-15 NOTE — PROGRESS NOTES
Hospitalist Progress Note Anya Hu MD 
Answering service: [phone] OR [phone] from in house phone Date of Service:  2019 NAME:  William Martino :  1924 MRN:  [id number] Admission Summary:  
80year old female presenting with altered mental status and found to have concern for cholecystitis. Interval history / Subjective:  
 
 
Patient seen bedside and extensive discussion done with the daugter See ACP note for more details Today she is tired and stays in bed all day She is total assist at this point Assessment & Plan:  
 
Sepsis, secondary to cholecystitis with fluid collection:  
-CT abd/pelvis with 9.4 x 5.2 x 5 cm collection  
-s/p IR cholecystotomy tube 
-General surgery following, will need to keep in place for couple weeks, follow up 3 weeks. Cholangiogram as outpatient 
-Repeat CT scan without additional fluid collection  
-continue levaquin, flagyl, total 14 days 
-Diet as tolerated, she has very poor nutrition Left renal mass, 2.5 cm: 
 appreciate urology review of CT scan, suspect stage I renal cell carcinoma, outpatient follow up with Dr. Gibson Espinoza, the daughter Reji Lima want pursue further given patient age and condition Acute hypoxic respiratory failure:  
unable to wean, order chest x-ray , shows persist PNA as seen on admission and right large pleural effusion This effusion could be from cholecystitis vs parapneumonia [less likely given b/l , though r>l] Will ask daughter if she agrees for thoracocentesis in am  
 
Pneumonia: POA  
continue antibiotics, levaquin Blood culture NGTD Leukocytosis is worsening and I will check in am again Citrobacter UTI: 
 sensitive to levaquin Hypokalemia: 
 replace as needed History of MAC:  
continue home ethambutol, azithromycin Dementia:  
supportive care Hypertension: Losartan, HCTZ Body mass index is 24.87 kg/m². Hypokalemia and hypoalbuminemia Code status: dnr 
 
See acp note Please note that even if surgically patient is cleared to be discharged , medically she is not cleared DVT prophylaxis: heparin Care Plan discussed with: Patient/Family Disposition: TBD needs PT reassessment Hospital Problems  Date Reviewed: 2/19/2019 Codes Class Noted POA Altered mental status ICD-10-CM: R41.82 
ICD-9-CM: 780.97  5/7/2019 Unknown Review of Systems:  
Negative unless stated above Vital Signs:  
 Last 24hrs VS reviewed since prior progress note. Most recent are: 
Visit Vitals /72 Pulse 92 Temp 97.3 °F (36.3 °C) Resp 16 Ht 5' 3.74\" (1.619 m) Wt 65.2 kg (143 lb 11.8 oz) SpO2 94% BMI 24.87 kg/m² Intake/Output Summary (Last 24 hours) at 5/14/2019 2124 Last data filed at 5/14/2019 9978 Gross per 24 hour Intake  Output 300 ml Net -300 ml Physical Examination:  
 
 
     
Constitutional:  No acute distress, cooperative, weak ENT:  Oral mucous moist, oropharynx benign. Neck supple, Resp:  right lung dimished breth sounds no accessory muscle use CV:  Regular rhythm, normal rate, no murmurs, gallops, rubs GI:  right cholecystomy tube in place, Soft, non distended, non tender Musculoskeletal:  No edema, warm, 2+ pulses throughout Neurologic:  Moves all extremities. Data Review:  
 Review and/or order of clinical lab test 
 
 
Labs:  
 
Recent Labs 05/14/19 
0249 WBC 12.5* HGB 12.7 HCT 38.3  Recent Labs 05/14/19 
3594 05/13/19 
2623 05/12/19 
0310  142 142  
K 3.6 3.1* 3.4*  
 106 110* CO2 32 31 24 BUN 11 14 13 CREA 0.79 0.89 0.81  87 86  
CA 9.1 8.3* 8.4* Recent Labs 05/14/19 
0249 05/13/19 
1200 SGOT 75* 67* ALT 56 54 AP 69 65 TBILI 0.3 0.3 TP 5.7* 5.4* ALB 2.4* 2.3*  
GLOB 3.3 3.1 No results for input(s): INR, PTP, APTT in the last 72 hours. No lab exists for component: INREXT, INREXT No results for input(s): FE, TIBC, PSAT, FERR in the last 72 hours. No results found for: FOL, RBCF No results for input(s): PH, PCO2, PO2 in the last 72 hours. No results for input(s): CPK, CKNDX, TROIQ in the last 72 hours. No lab exists for component: CPKMB Lab Results Component Value Date/Time Cholesterol, total 208 (H) 05/11/2009 12:15 PM  
 HDL Cholesterol 90 (H) 05/11/2009 12:15 PM  
 LDL, calculated 107.6 (H) 05/11/2009 12:15 PM  
 Triglyceride 52 05/11/2009 12:15 PM  
 CHOL/HDL Ratio 2.3 05/11/2009 12:15 PM  
 
Lab Results Component Value Date/Time Glucose (POC) 108 (H) 05/14/2019 06:10 AM  
 Glucose (POC) 183 (H) 05/07/2019 10:57 AM  
 
Lab Results Component Value Date/Time Color YELLOW/STRAW 05/07/2019 11:42 AM  
 Appearance TURBID (A) 05/07/2019 11:42 AM  
 Specific gravity 1.021 05/07/2019 11:42 AM  
 pH (UA) 5.5 05/07/2019 11:42 AM  
 Protein 100 (A) 05/07/2019 11:42 AM  
 Glucose NEGATIVE  05/07/2019 11:42 AM  
 Ketone NEGATIVE  05/07/2019 11:42 AM  
 Bilirubin NEGATIVE  05/07/2019 11:42 AM  
 Urobilinogen 0.2 05/07/2019 11:42 AM  
 Nitrites POSITIVE (A) 05/07/2019 11:42 AM  
 Leukocyte Esterase LARGE (A) 05/07/2019 11:42 AM  
 Epithelial cells FEW 05/07/2019 11:42 AM  
 Bacteria 2+ (A) 05/07/2019 11:42 AM  
 WBC >100 (H) 05/07/2019 11:42 AM  
 RBC 5-10 05/07/2019 11:42 AM  
 
 
 
Medications Reviewed:  
 
Current Facility-Administered Medications Medication Dose Route Frequency  miconazole (SECURA) 2 % extra thick cream   Topical BID  hydrocortisone (CORTAID) 1 % cream   Topical BID  metroNIDAZOLE (FLAGYL) tablet 500 mg  500 mg Oral Q12H  levoFLOXacin (LEVAQUIN) tablet 750 mg  750 mg Oral Q48H  
 losartan (COZAAR) tablet 100 mg  100 mg Oral DAILY  hydroCHLOROthiazide (HYDRODIURIL) tablet 25 mg  25 mg Oral DAILY  ondansetron (ZOFRAN) injection 4 mg  4 mg IntraVENous Q8H PRN  
 albuterol-ipratropium (DUO-NEB) 2.5 MG-0.5 MG/3 ML  3 mL Nebulization Q6H PRN  
 azithromycin (ZITHROMAX) tablet 250 mg  250 mg Oral DAILY  pantoprazole (PROTONIX) tablet 40 mg  40 mg Oral ACB  ethambutol (MYAMBUTOL) tablet 800 mg  800 mg Oral DAILY  lactobac ac& pc-s.therm-b.anim (MANI Q/RISAQUAD)  1 Cap Oral DAILY  sodium chloride (NS) flush 5-40 mL  5-40 mL IntraVENous Q8H  
 sodium chloride (NS) flush 5-40 mL  5-40 mL IntraVENous PRN  
 acetaminophen (TYLENOL) suppository 650 mg  650 mg Rectal Q4H PRN  
 heparin (porcine) injection 5,000 Units  5,000 Units SubCUTAneous Q8H  
 arformoterol (BROVANA) neb solution 15 mcg  15 mcg Nebulization BID RT And  
 budesonide (PULMICORT) 500 mcg/2 ml nebulizer suspension  500 mcg Nebulization BID RT  
 
______________________________________________________________________ EXPECTED LENGTH OF STAY: 4d 19h ACTUAL LENGTH OF STAY:          7 Terry Sam MD

## 2019-05-15 NOTE — ACP (ADVANCE CARE PLANNING)
Advance Care Planning Note    Name: Keven Dow  YOB: 1924  MRN: [id number]  Admission Date: 5/7/2019 10:48 AM    Date of discussion: 5/14/2019    Active Diagnoses:    Hospital Problems  Date Reviewed: 2/19/2019          Codes Class Noted POA    Altered mental status ICD-10-CM: R41.82  ICD-9-CM: 780.97  5/7/2019 Unknown               These active diagnoses are of sufficient risk that focused discussion on advance care planning is indicated in order to allow the patient to thoughtfully consider personal goals of care, and if situations arise that prevent the ability to personally give input, to ensure appropriate representation of their personal desires for different levels and aggressiveness of care. Discussion:     Persons present and participating in discussion: Ellie Douglass MD, and daughter     Discussion: extensive discussion with the daughter with patient also in the room   I explained the daughter that patient is declining and im not sure she would ever be back to her prehospitalisation clinical state and given age and poor nutrition status and presence of kidney mass that comfort care is recommended. Daughter has siblings that she need to discuss with [3 sisters ] and will look into this . She wants DNR and active care for now     Time Spent:     Total time spent face-to-face in education and discussion: 25  minutes.      Gopal Bloom MD  5/14/2019  9:59 PM

## 2019-05-15 NOTE — PROGRESS NOTES
Problem: Self Care Deficits Care Plan (Adult) Goal: *Acute Goals and Plan of Care (Insert Text) Description Occupational Therapy Goals Initiated: 5/9/2019 1. Patient will perform grooming with supervision/set-up sitting in chair within 7 day(s). 2.  Patient will perform bathing with mod A from chair within 7 day(s). 3.  Patient will perform upper body dressing and lower body dressing with mod A within 7 day(s). 4.  Patient will perform toilet transfers with mod A within 7 day(s). 5.  Patient will perform all aspects of toileting with min A within 7 day(s). Outcome: Progressing Towards Goal 
 OCCUPATIONAL THERAPY TREATMENT Patient: Evan George (17 y.o. female) Date: 5/15/2019 Diagnosis: Altered mental status [R41.82] <principal problem not specified> Precautions: Contact, Fall, Bed Alarm Chart, occupational therapy assessment, plan of care, and goals were reviewed. ASSESSMENT:  
The patient presents with Total assistance upper body ADLs, Total assistance lower body ADLs, and Moderate Assistance vs Maximum assistance assist functional mobility, good participation with encouragement this session, tolerated EOB for 4 minutes with intermittent need or support, family would like continued therapy until final plans are made. The following are barriers to ADL independence while in acute care:  
- Cognitive and/or behavioral: orientation, attention to task, safety awareness, insight into deficits, insight into abilities and fear of falling - Medical condition: ROM, strength, functional reach, functional endurance, sitting balance, standing balance and medical history   
- Other:    
 
Prior level of function: from [facility], plans to return back to rehab vs. hospice PLAN: 
Patient continues to benefit from skilled intervention to address the above impairments. Continue treatment per established plan of care. Recommend with staff: EOB as tolerated, OOB to chair to increase tolerance Recommend next OT session: EOB ADLs, up to chair, any participation with self care Discharge recommendations: Rehab at skilled nursing facility (SNF) (to regain functional baseline patient requires rehab) If above is not an option then recommend: 24 supervision Long term care Barriers to discharging home, in addition to above listed impairments: level of physical assist required to maintain patient safety. Equipment recommendations for successful discharge (if) home: tbd at facility SUBJECTIVE:  
Patient stated ? I'll do whatever you tell me to do, just lead me.? OBJECTIVE DATA SUMMARY:  
Cognitive/Behavioral Status: 
Neurologic State: Confused Orientation Level: Oriented to person;Disoriented to place; Disoriented to situation;Disoriented to time Cognition: Memory loss;Decreased attention/concentration; Follows commands Functional Mobility and Transfers for ADLs: 
Bed Mobility: 
Rolling: Moderate assistance Supine to Sit: Moderate assistance Sit to Supine: Maximum assistance Scooting: Maximum assistance Transfers: 
Sit to Stand: Maximum assistance Functional Transfers Bathroom Mobility: Dependent/total assistance(bed ) Balance: 
Sitting: Impaired;High guard Sitting - Static: Fair (occasional) Sitting - Dynamic: Fair (occasional); Poor (constant support) Standing: Impaired Standing - Static: Poor Standing - Dynamic : Poor ADL Intervention: 
  
 
Grooming Washing Face: Contact guard assistance Tolerated increased activity EOB, fatigue however encouraged sitting for 4 minutes, applied hydrocortisone to back rash per nurse recommendation, moderate fatigue but required verbal cues, safety concerns and fear of falling Upper Body Dressing Assistance Hospital Gown: Total assistance (dependent) Lower Body Dressing Assistance Protective Undergarmet: Total assistance (dependent) Socks: Total assistance (dependent) Toileting Toileting Assistance: Total assistance(dependent) Bladder Hygiene: Total assistance (dependent) Bowel Hygiene: Total assistance (dependent) Clothing Management: Total assistance (dependent) Therapeutic Exercises:  
 
Pain: 
 
 
Activity Tolerance:  
Fair and requires rest breaks Please refer to the flowsheet for vital signs taken during this treatment. After treatment patient left:  
Supine in bed Caregiver at bedside Call light within reach RN notified COMMUNICATION/COLLABORATION:  
The patient?s plan of care was discussed with: Physical Therapist and Registered Nurse Ar Geiger OT Time Calculation: 34 mins

## 2019-05-15 NOTE — DISCHARGE INSTRUCTIONS
Discharge Instructions       PATIENT ID: Abdoulaye Pinto  MRN: [id number]   YOB: 1924    DATE OF ADMISSION: 5/7/2019 10:48 AM    DATE OF DISCHARGE: 5/15/2019    PRIMARY CARE PROVIDER: oRb Narayanan MD     ATTENDING PHYSICIAN: Praveen Ingram MD  DISCHARGING PROVIDER: Vaughn Fried MD    To contact this individual call [phone] and ask the  to page. If unavailable ask to be transferred the Adult Hospitalist Department. DISCHARGE DIAGNOSES     Cholecystitis with cholecystotomy tube placement  Renal mass  Pneumonia  UTI     CONSULTATIONS: IP CONSULT TO GENERAL SURGERY  IP CONSULT TO HOSPITALIST  IP CONSULT TO UROLOGY  IP CONSULT TO PULMONOLOGY    PROCEDURES/SURGERIES: * No surgery found *    PENDING TEST RESULTS:   At the time of discharge the following test results are still pending: none    FOLLOW UP APPOINTMENTS:   Follow-up Information     Follow up With Specialties Details Why Contact Saadia Mccormick MD Urology In 2 weeks Schedule follow up appointment [facility]  [phone]      Nikki Ochoa MD General Surgery, Surgical Oncology, Colon and Rectal Surgery In 1 week Schedule follow up appointment in 3 weeks  [address] [facility],[address]  [phone]      Rob Narayanan MD Family Practice In 1 week  [address] [facility]  [phone]             ADDITIONAL CARE RECOMMENDATIONS:     Plan:   Cholecystitis with cholecystotomy tube placement:  -Need two weeks antibiotics total,  -Follow up with Dr. Brea Warren in his office in 3 weeks. His office will schedule cholangiogram to evaluate tube. Renal mass:  -Follow up with Dr. Ariane Casas (urologist). There is concern that the mass is renal cell carcinoma. You will need future scans to evaluate the mass.      Urinary tract infection and pneumonia: will be treated with same antibiotics     Follow up with primary care physician         DIET: regular diet with protein supplements       WOUND CARE:     EQUIPMENT needed:       DISCHARGE MEDICATIONS:   See Medication Reconciliation Form    · It is important that you take the medication exactly as they are prescribed. · Keep your medication in the bottles provided by the pharmacist and keep a list of the medication names, dosages, and times to be taken in your wallet. · Do not take other medications without consulting your doctor. NOTIFY YOUR PHYSICIAN FOR ANY OF THE FOLLOWING:   Fever over 101 degrees for 24 hours. Chest pain, shortness of breath, fever, chills, nausea, vomiting, diarrhea, change in mentation, falling, weakness, bleeding. Severe pain or pain not relieved by medications. Or, any other signs or symptoms that you may have questions about.         Signed:   Steven Connors MD  5/15/2019  12:30 PM

## 2019-05-15 NOTE — PROGRESS NOTES
Bedside shift change report given to ALESSANDRA Tristan (oncoming nurse) by Mitra Rojas RN (offgoing nurse). Report included the following information SBAR, Kardex, Intake/Output, MAR and Recent Results

## 2019-06-06 ENCOUNTER — HOSPITAL ENCOUNTER (OUTPATIENT)
Dept: INTERVENTIONAL RADIOLOGY/VASCULAR | Age: 84
Discharge: HOME | End: 2019-06-06
Attending: SURGERY | Admitting: SURGERY
Payer: MEDICARE

## 2019-06-06 ENCOUNTER — OFFICE VISIT (OUTPATIENT)
Dept: SURGERY | Age: 84
End: 2019-06-06

## 2019-06-06 VITALS
BODY MASS INDEX: 19.74 KG/M2 | HEART RATE: 78 BPM | RESPIRATION RATE: 18 BRPM | TEMPERATURE: 97.8 F | SYSTOLIC BLOOD PRESSURE: 128 MMHG | DIASTOLIC BLOOD PRESSURE: 84 MMHG | HEIGHT: 64 IN | WEIGHT: 115.6 LBS

## 2019-06-06 VITALS — TEMPERATURE: 98.1 F | RESPIRATION RATE: 22 BRPM | HEART RATE: 92 BPM | OXYGEN SATURATION: 96 %

## 2019-06-06 DIAGNOSIS — K81.9 CHOLECYSTITIS: Primary | ICD-10-CM

## 2019-06-06 DIAGNOSIS — K81.9 CHOLECYSTITIS: ICD-10-CM

## 2019-06-06 PROCEDURE — 47531 INJECTION FOR CHOLANGIOGRAM: CPT

## 2019-06-06 PROCEDURE — 74011636320 HC RX REV CODE- 636/320: Performed by: RADIOLOGY

## 2019-06-06 RX ORDER — TRAMADOL HYDROCHLORIDE 50 MG/1
50 TABLET ORAL
COMMUNITY

## 2019-06-06 RX ADMIN — IOPAMIDOL 5 ML: 612 INJECTION, SOLUTION INTRAVENOUS at 12:31

## 2019-06-06 NOTE — PROGRESS NOTES
1. Have you been to the ER, urgent care clinic since your last visit? Hospitalized since your last visit? 5/7/19 Altered in mental state-in [facility] care    2. Have you seen or consulted any other health care providers outside of the [facility] since your last visit? Include any pap smears or colon screening. No    2 Daughters are here with patient today. Patient is in wheelchair. Patient still has cholecystostomy tube in place. Patient currently resides at [facility].

## 2019-06-06 NOTE — DISCHARGE INSTRUCTIONS
Notify us of nausea, itching, hives, dizziness, or any unusual symptoms. Should you experience any of these significant changes, please call [phone] between the hours of 7:30 am and 10 pm or [phone] after hours. After hours, ask the  to page the X-ray Technologist, and describe the problem to the technologist.     Leave the right upper abdominal dressing in place for 48 hours unless it becomes soiled or wet. Keep the dressing clean and dry. You may shower in 48 hours and replace with bandaid until healed. Call you physician with any fevers, increasing pain, nausea or vomiting. Monitor the site for symptoms of infection, (redness, drainage, increasing pain or fever). Please call our department with any questions or concerns.     Jericho Finn RN    7-1-32

## 2019-06-06 NOTE — PROGRESS NOTES
Rcvd. In angio via wheelchair with daughter (POA). Cholangiogram done by Dr. Danis Pascal and drain removed. 2x2 and transparent dressing to site. No redness or drainage at site. Denies any discomfort. Discharge instructions reviewed with patient and daughter. Copy with daughter at time of discharge. To transport van via personal wheelchair with family.

## 2019-06-19 ENCOUNTER — OFFICE VISIT (OUTPATIENT)
Dept: CARDIOLOGY CLINIC | Age: 84
End: 2019-06-19

## 2019-06-19 DIAGNOSIS — Z95.0 CARDIAC PACEMAKER IN SITU: Primary | ICD-10-CM

## 2019-06-27 NOTE — PROGRESS NOTES
[facility]      Clinic Note - Follow up    Edgardo Curtis returns for scheduled follow up today. She was recently in the hospital for sepsis and had a cholecystostomy tube placed for possible cholecystitis. She has improved and is without any abdominal pain. Her drain is now putting out very little bile. She is eating well. No further fevers. No jaundice. Objective     Visit Vitals  /84 (BP 1 Location: Right arm, BP Patient Position: Sitting)   Pulse 78   Temp 97.8 °F (36.6 °C) (Oral)   Resp 18   Ht 5' 3.75\" (1.619 m)   Wt 115 lb 9.6 oz (52.4 kg)   BMI 20.00 kg/m²         PE  GEN - Awake, alert, communicating appropriately. NAD  Abd - soft, NT, ND.  cholecysostomy but with bilious drainage. Labs  None    Assessment     Abdoulaye Pinto is a 80 y. o.yr old female who had a cholecystostomy tube placed for possible cholecystitis. She is improved now. Plan     I will have her get a cholangiogram through the tube today. If this shows a patent cystic duct, the tube can be removed. I do not plan to perform a cholecystectomy on her as she is very high risk for surgery due to her age and comorbidities. She also does not wish to pursue surgery. I will see her back as needed.       Halle Felton MD  6/6/19    CC: Rob Narayanan MD

## 2020-03-25 ENCOUNTER — OFFICE VISIT (OUTPATIENT)
Dept: CARDIOLOGY CLINIC | Age: 85
End: 2020-03-25

## 2020-03-25 DIAGNOSIS — Z95.0 CARDIAC PACEMAKER IN SITU: Primary | ICD-10-CM

## 2020-07-22 ENCOUNTER — OFFICE VISIT (OUTPATIENT)
Dept: CARDIOLOGY CLINIC | Age: 85
End: 2020-07-22

## 2020-07-22 DIAGNOSIS — Z95.0 CARDIAC PACEMAKER IN SITU: Primary | ICD-10-CM

## 2020-11-11 ENCOUNTER — OFFICE VISIT (OUTPATIENT)
Dept: CARDIOLOGY CLINIC | Age: 85
End: 2020-11-11
Payer: MEDICARE

## 2020-11-11 DIAGNOSIS — Z95.0 CARDIAC PACEMAKER IN SITU: Primary | ICD-10-CM

## 2020-11-11 PROCEDURE — 93296 REM INTERROG EVL PM/IDS: CPT | Performed by: INTERNAL MEDICINE

## 2020-11-11 PROCEDURE — 93294 REM INTERROG EVL PM/LDLS PM: CPT | Performed by: INTERNAL MEDICINE

## 2025-05-06 NOTE — PROGRESS NOTES
Bedside shift change report given to Uvaldo Finnegan (oncoming nurse) by Rupali Mathur (offgoing nurse). Report included the following information SBAR. Opt out

## (undated) DEVICE — MEDI-VAC NON-CONDUCTIVE SUCTION TUBING: Brand: CARDINAL HEALTH

## (undated) DEVICE — INFECTION CONTROL KIT SYS

## (undated) DEVICE — STRETCH BANDAGE ROLL: Brand: DERMACEA

## (undated) DEVICE — REM POLYHESIVE ADULT PATIENT RETURN ELECTRODE: Brand: VALLEYLAB

## (undated) DEVICE — NEEDLE HYPO 25GA L1.5IN BVL ORIENTED ECLIPSE

## (undated) DEVICE — STERILE POLYISOPRENE POWDER-FREE SURGICAL GLOVES: Brand: PROTEXIS

## (undated) DEVICE — SUTURE VCRL SZ 2-0 L18IN ABSRB UD CT-1 L36MM 1/2 CIR J839D

## (undated) DEVICE — Z DISCONTINUED USE 2716304 SUTURE STRATAFIX SPRL SZ 3-0 L12IN ABSRB UD FS-1 L24MM 3/8

## (undated) DEVICE — SYR 10ML CTRL LR LCK NSAF LF --

## (undated) DEVICE — INSULATED NEEDLE ELECTRODE: Brand: EDGE

## (undated) DEVICE — SUT ETHLN 3-0 18IN PS1 BLK --

## (undated) DEVICE — GAUZE SPONGES,12 PLY: Brand: CURITY

## (undated) DEVICE — SUT PROL 0 30IN CT1 BLU --

## (undated) DEVICE — SLIM BODY SKIN STAPLER: Brand: APPOSE ULC

## (undated) DEVICE — OCCLUSIVE GAUZE STRIP,3% BISMUTH TRIBROMOPHENATE IN PETROLATUM BLEND: Brand: XEROFORM

## (undated) DEVICE — Device

## (undated) DEVICE — SOLUTION IV 1000ML 0.9% SOD CHL

## (undated) DEVICE — SUT SLK 2-0SH 30IN BLK --

## (undated) DEVICE — DRAPE,LAPAROTOMY,T,PEDI,STERILE: Brand: MEDLINE